# Patient Record
Sex: FEMALE | Race: WHITE | NOT HISPANIC OR LATINO | URBAN - METROPOLITAN AREA
[De-identification: names, ages, dates, MRNs, and addresses within clinical notes are randomized per-mention and may not be internally consistent; named-entity substitution may affect disease eponyms.]

---

## 2020-02-24 PROBLEM — Z96.1 PC IOL: Noted: 2020-02-24

## 2021-06-24 ENCOUNTER — NEW REFERRAL (OUTPATIENT)
Dept: URBAN - METROPOLITAN AREA CLINIC 105 | Facility: CLINIC | Age: 86
End: 2021-06-24

## 2021-06-24 VITALS — SYSTOLIC BLOOD PRESSURE: 160 MMHG | HEIGHT: 55 IN | DIASTOLIC BLOOD PRESSURE: 79 MMHG

## 2021-06-24 DIAGNOSIS — H35.3112: ICD-10-CM

## 2021-06-24 DIAGNOSIS — H35.352: ICD-10-CM

## 2021-06-24 DIAGNOSIS — H35.89: ICD-10-CM

## 2021-06-24 DIAGNOSIS — Z96.1: ICD-10-CM

## 2021-06-24 PROCEDURE — 99244 OFF/OP CNSLTJ NEW/EST MOD 40: CPT

## 2021-06-24 PROCEDURE — 92202 OPSCPY EXTND ON/MAC DRAW: CPT

## 2021-06-24 PROCEDURE — 92134 CPTRZ OPH DX IMG PST SGM RTA: CPT

## 2021-06-24 ASSESSMENT — VISUAL ACUITY
OD_SC: 20/100
OS_SC: 20/70
OD_CC: 20/50
OD_PH: 20/40-2
OS_CC: 20/40

## 2021-06-24 ASSESSMENT — TONOMETRY
OS_IOP_MMHG: 11
OD_IOP_MMHG: 13

## 2021-07-15 ENCOUNTER — DIAGNOSTICS ONLY (OUTPATIENT)
Dept: URBAN - METROPOLITAN AREA CLINIC 105 | Facility: CLINIC | Age: 86
End: 2021-07-15

## 2021-07-15 DIAGNOSIS — H35.3132: ICD-10-CM

## 2021-07-15 DIAGNOSIS — H35.042: ICD-10-CM

## 2021-07-15 DIAGNOSIS — H35.89: ICD-10-CM

## 2021-07-15 PROCEDURE — 92250 FUNDUS PHOTOGRAPHY W/I&R: CPT

## 2021-07-15 PROCEDURE — 92235 FLUORESCEIN ANGRPH MLTIFRAME: CPT

## 2021-07-15 ASSESSMENT — VISUAL ACUITY
OS_CC: 20/50
OD_CC: 20/40

## 2021-07-15 ASSESSMENT — TONOMETRY
OD_IOP_MMHG: 11
OS_IOP_MMHG: 12

## 2021-11-09 PROBLEM — R00.2 PALPITATIONS: Status: ACTIVE | Noted: 2021-11-09

## 2021-11-09 PROBLEM — I48.91 A-FIB (CMS/HCC): Status: ACTIVE | Noted: 2021-11-09

## 2021-11-10 ENCOUNTER — OFFICE VISIT (OUTPATIENT)
Dept: CARDIOLOGY | Facility: CLINIC | Age: 86
End: 2021-11-10
Payer: MEDICARE

## 2021-11-10 VITALS
WEIGHT: 146 LBS | BODY MASS INDEX: 23.46 KG/M2 | HEIGHT: 66 IN | HEART RATE: 58 BPM | DIASTOLIC BLOOD PRESSURE: 90 MMHG | RESPIRATION RATE: 16 BRPM | SYSTOLIC BLOOD PRESSURE: 130 MMHG

## 2021-11-10 DIAGNOSIS — R00.2 PALPITATIONS: ICD-10-CM

## 2021-11-10 DIAGNOSIS — I48.91 ATRIAL FIBRILLATION, UNSPECIFIED TYPE (CMS/HCC): Primary | ICD-10-CM

## 2021-11-10 DIAGNOSIS — R42 LIGHTHEADEDNESS: ICD-10-CM

## 2021-11-10 PROCEDURE — 93000 ELECTROCARDIOGRAM COMPLETE: CPT | Performed by: INTERNAL MEDICINE

## 2021-11-10 PROCEDURE — 99203 OFFICE O/P NEW LOW 30 MIN: CPT | Performed by: INTERNAL MEDICINE

## 2021-11-10 RX ORDER — SPIRONOLACTONE 25 MG
600 TABLET ORAL ONCE
COMMUNITY

## 2021-11-10 RX ORDER — SUMATRIPTAN SUCCINATE 50 MG/1
50 TABLET ORAL ONCE AS NEEDED
COMMUNITY
Start: 2021-09-22 | End: 2022-07-01 | Stop reason: SDUPTHER

## 2021-11-10 RX ORDER — MINERAL OIL
180 ENEMA (ML) RECTAL ONCE
COMMUNITY
End: 2024-02-17 | Stop reason: ENTERED-IN-ERROR

## 2021-11-10 RX ORDER — TRIAMCINOLONE ACETONIDE 55 UG/1
2 SPRAY, METERED NASAL DAILY PRN
COMMUNITY
End: 2024-02-17 | Stop reason: ENTERED-IN-ERROR

## 2021-11-10 RX ORDER — METOPROLOL SUCCINATE 25 MG/1
25 TABLET, EXTENDED RELEASE ORAL ONCE
COMMUNITY
Start: 2021-02-23 | End: 2022-02-09 | Stop reason: SDUPTHER

## 2021-11-10 ASSESSMENT — CHADS2 SCORE
DIABETES: NO
CHADS2 SCORE: 3
SEX: FEMALE (+1PT.)
HYPERTENSION: NO
AGE: 75+ (+2 PT.)
VASCULAR DISEASE: NO
CHF: NO
PRIOR STROKE OR TIA OR THROMBOEMBOLISM: NO

## 2021-11-10 NOTE — PROGRESS NOTES
November 13, 2021      Patient: Shalini Kunz   YOB: 1935   Date of Visit 11/10/2021   [facility]# [id number]   MRN# [id number]       REPORT TYPE: Office Letter    DATE OF SERVICE: 11/10/2021    Robin Pablo MD    Dear Jf,    I met a patient named Shalini Kunz today in my office.  You had suggested that she see me because she has moved into this area recently.  Unfortunately, she did not bring along any records.  Much of what I gathered today was from a patient care summary that she had and her recollection.  From what I could tell, she is an 86-year-old woman who several years ago while in her 20s had the beginnings of supraventricular tachycardia.  That arrhythmia has occurred rarely over the last several years and has not been a particular bother to her. However, approximately 2 years ago, she had gastrointestinal surgery and postoperatively developed DVT and a pulmonary embolism.  She was started on anticoagulation and is now using APIXABAN at a dose of 2.5 mg twice per day.  Her other medicines are SUMATRIPTAN, FEXOFENADINE, and METOPROLOL 25 mg once a day.  When she had her pulmonary embolism, she said she may have also had the onset of atrial fibrillation, but that has not been a very prominent problem for  her.  In fact, she has been mostly asymptomatic.  Her principal complaint today was lightheadedness.  She occasionally becomes lightheaded without warning and without necessarily a change in position to the point of needing to steady herself.  She has not had syncope or presyncope.  She told me that she has had cardiac monitoring as well as noninvasive cardiac tests through your office within the last couple of years, but again I do not have any of those reports.  I did have a CBC that was drawn for her in April of this year that showed a normal blood count and platelet count.    Ms. Kunz has a history of some form of liver disease, pyloric stricture, hyperlipidemia, and she has been  labeled as hypertensive, although she does remember taking any antihypertensive medications.  She is retired and is now living with her  at [place].  She has a history of having had eye surgery and gallbladder removal.  She has not been a smoker and does not use large amounts of caffeine or alcohol and denied the use of illicit drugs.  She did not tell us about any specific drug or food allergies.    On physical examination, blood pressure was 130/90 without orthostatic change, with a heart rate of 58 beats per minute.  His rhythm was regular without ectopic beats.  Respiratory rate was 18 and she weighed 146 pounds.  Her cardiopulmonary examination was unremarkable.  She did not have any murmurs or gallops, and her lung fields were clear.  She did not have jugular venous distention, her carotid upstrokes were brisk and she had no bruits.  She had no abdominal findings of organomegaly or ascites, and she did not have any   peripheral edema.  I could easily palpate pulses. Her electrocardiogram showed sinus rhythm with just a faint suggestion of an intraatrial conduction delay.    I had a long conversation with Ms. Kunz.  I did not change her medicines as I believe she is on a fairly reasonable and conservative program.  I am going to have to see the results of cardiac testing that has been carried out in the last couple of years.  If you could send those over to our office, that would be very helpful.  Ms. Kunz will be in touch with your secretarial staff to help make that happen soon.  I did not order any studies until I review the tests that have been carried out so far. I will have Ms. Kunz back to my office when those records are available and we will rediscuss her situation.  She is also going to be seeing one of internists in the area, Dr. Pittman, for general internal medicine care and I am sure that Renée will be ordering some laboratory studies as well.    I answered a number of questions for Ms.  Ze and she was quite satisfied with our plan of having her back to my office after the first of the year for a reconnoiter.  In the meantime, we will be available to answer any of her questions or to address concerns.  We do appreciate the opportunity to see her in the office.    I spent approximately 45 minutes with Ms. Kunz's case this afternoon going through her information, taking a history, performing a physical examination, interpreting her electrocardiogram, going through her medication list, making plans for follow-up, answering her questions, and documenting all of the above.    Thank you for your consideration.    Best regards,      Adrian Jo MD    CC:LOAN BRAN DO    DD: 11/10/2021 15:46  DT: 11/10/2021 15:49  Voice ID: [id number]/Report ID: [id number]  am

## 2021-12-03 ENCOUNTER — TELEPHONE (OUTPATIENT)
Dept: PRIMARY CARE | Facility: CLINIC | Age: 86
End: 2021-12-03

## 2021-12-03 DIAGNOSIS — I48.91 ATRIAL FIBRILLATION, UNSPECIFIED TYPE (CMS/HCC): Primary | ICD-10-CM

## 2021-12-03 DIAGNOSIS — E55.9 VITAMIN D DEFICIENCY: ICD-10-CM

## 2021-12-03 DIAGNOSIS — E53.8 B12 DEFICIENCY: ICD-10-CM

## 2021-12-03 DIAGNOSIS — I10 HYPERTENSION, UNSPECIFIED TYPE: ICD-10-CM

## 2021-12-03 DIAGNOSIS — E78.5 HYPERLIPIDEMIA, UNSPECIFIED HYPERLIPIDEMIA TYPE: ICD-10-CM

## 2021-12-03 DIAGNOSIS — R79.9 ABNORMAL FINDING OF BLOOD CHEMISTRY, UNSPECIFIED: ICD-10-CM

## 2021-12-07 ENCOUNTER — LAB REQUISITION (OUTPATIENT)
Dept: LAB | Facility: HOSPITAL | Age: 86
End: 2021-12-07
Attending: FAMILY MEDICINE
Payer: MEDICARE

## 2021-12-07 DIAGNOSIS — I10 ESSENTIAL (PRIMARY) HYPERTENSION: ICD-10-CM

## 2021-12-07 DIAGNOSIS — I48.91 UNSPECIFIED ATRIAL FIBRILLATION (CMS/HCC): ICD-10-CM

## 2021-12-07 DIAGNOSIS — E78.5 HYPERLIPIDEMIA, UNSPECIFIED: ICD-10-CM

## 2021-12-07 LAB
25(OH)D3 SERPL-MCNC: 25 NG/ML (ref 30–100)
ALBUMIN SERPL-MCNC: 3.6 G/DL (ref 3.4–5)
ALP SERPL-CCNC: 105 IU/L (ref 35–126)
ALT SERPL-CCNC: 17 IU/L (ref 11–54)
ANION GAP SERPL CALC-SCNC: 8 MEQ/L (ref 3–15)
AST SERPL-CCNC: 21 IU/L (ref 15–41)
BASOPHILS # BLD: 0.02 K/UL (ref 0.01–0.1)
BASOPHILS NFR BLD: 0.4 %
BILIRUB SERPL-MCNC: 0.7 MG/DL (ref 0.3–1.2)
BUN SERPL-MCNC: 18 MG/DL (ref 8–20)
CALCIUM SERPL-MCNC: 9.5 MG/DL (ref 8.9–10.3)
CHLORIDE SERPL-SCNC: 105 MEQ/L (ref 98–109)
CHOLEST SERPL-MCNC: 173 MG/DL
CO2 SERPL-SCNC: 25 MEQ/L (ref 22–32)
CREAT SERPL-MCNC: 0.9 MG/DL (ref 0.6–1.1)
DIFFERENTIAL METHOD BLD: ABNORMAL
EOSINOPHIL # BLD: 0.06 K/UL (ref 0.04–0.36)
EOSINOPHIL NFR BLD: 1.3 %
ERYTHROCYTE [DISTWIDTH] IN BLOOD BY AUTOMATED COUNT: 12.5 % (ref 11.7–14.4)
EST. AVERAGE GLUCOSE BLD GHB EST-MCNC: 108 MG/DL
GFR SERPL CREATININE-BSD FRML MDRD: 59.7 ML/MIN/1.73M*2
GLUCOSE SERPL-MCNC: 86 MG/DL (ref 70–99)
HBA1C MFR BLD HPLC: 5.4 %
HCT VFR BLDCO AUTO: 43.7 % (ref 35–45)
HDLC SERPL-MCNC: 52 MG/DL
HDLC SERPL: 3.3 {RATIO}
HGB BLD-MCNC: 14 G/DL (ref 11.8–15.7)
IMM GRANULOCYTES # BLD AUTO: 0.01 K/UL (ref 0–0.08)
IMM GRANULOCYTES NFR BLD AUTO: 0.2 %
LDLC SERPL CALC-MCNC: 86 MG/DL
LYMPHOCYTES # BLD: 1.4 K/UL (ref 1.2–3.5)
LYMPHOCYTES NFR BLD: 29.6 %
MCH RBC QN AUTO: 30.2 PG (ref 28–33.2)
MCHC RBC AUTO-ENTMCNC: 32 G/DL (ref 32.2–35.5)
MCV RBC AUTO: 94.2 FL (ref 83–98)
MONOCYTES # BLD: 0.5 K/UL (ref 0.28–0.8)
MONOCYTES NFR BLD: 10.6 %
NEUTROPHILS # BLD: 2.74 K/UL (ref 1.7–7)
NEUTS SEG NFR BLD: 57.9 %
NONHDLC SERPL-MCNC: 121 MG/DL
NRBC BLD-RTO: 0 %
PDW BLD AUTO: 11.2 FL (ref 9.4–12.3)
PLATELET # BLD AUTO: 209 K/UL (ref 150–369)
POTASSIUM SERPL-SCNC: 4.5 MEQ/L (ref 3.6–5.1)
PROT SERPL-MCNC: 5.1 G/DL (ref 6–8.2)
RBC # BLD AUTO: 4.64 M/UL (ref 3.93–5.22)
SODIUM SERPL-SCNC: 138 MEQ/L (ref 136–144)
T4 FREE SERPL-MCNC: 1.15 NG/DL (ref 0.58–1.64)
TRIGL SERPL-MCNC: 174 MG/DL (ref 30–149)
TSH SERPL DL<=0.05 MIU/L-ACNC: 4.22 MIU/L (ref 0.34–5.6)
VIT B12 SERPL-MCNC: 195 PG/ML (ref 180–914)
WBC # BLD AUTO: 4.73 K/UL (ref 3.8–10.5)

## 2021-12-07 PROCEDURE — 82607 VITAMIN B-12: CPT | Performed by: FAMILY MEDICINE

## 2021-12-07 PROCEDURE — 80053 COMPREHEN METABOLIC PANEL: CPT | Performed by: FAMILY MEDICINE

## 2021-12-07 PROCEDURE — 80061 LIPID PANEL: CPT | Performed by: FAMILY MEDICINE

## 2021-12-07 PROCEDURE — 84443 ASSAY THYROID STIM HORMONE: CPT | Performed by: FAMILY MEDICINE

## 2021-12-07 PROCEDURE — 85025 COMPLETE CBC W/AUTO DIFF WBC: CPT | Performed by: FAMILY MEDICINE

## 2021-12-07 PROCEDURE — 84439 ASSAY OF FREE THYROXINE: CPT | Performed by: FAMILY MEDICINE

## 2021-12-07 PROCEDURE — 83036 HEMOGLOBIN GLYCOSYLATED A1C: CPT | Mod: GZ | Performed by: FAMILY MEDICINE

## 2021-12-07 PROCEDURE — 82306 VITAMIN D 25 HYDROXY: CPT | Mod: GZ | Performed by: FAMILY MEDICINE

## 2021-12-08 ENCOUNTER — TELEPHONE (OUTPATIENT)
Dept: PRIMARY CARE | Facility: CLINIC | Age: 86
End: 2021-12-08
Payer: MEDICARE

## 2021-12-08 NOTE — TELEPHONE ENCOUNTER
Called and spoke to pt re: stable lab results - also relay Dr. SERRANO recommendation for pt to take 1000 mcg of vitamin B 12 and 4000 IU of vitamin D3 OTC daily - pt ask to repeat information, which was done - pt did express understanding of the recommendation[s].

## 2021-12-08 NOTE — TELEPHONE ENCOUNTER
----- Message from Renée Pittman DO sent at 12/8/2021  7:51 AM EST -----  [place] resident. Please let her know her labs are stable and good. However her vitamin D and vitamin b12 are LOW. I would like her to start taking vitamin B 12 1000mcg OTC daily and vitamin D3 4000 IU OTC po daily. Thank you

## 2021-12-10 ENCOUNTER — OFFICE VISIT (OUTPATIENT)
Dept: INTERNAL MEDICINE | Facility: CLINIC | Age: 86
End: 2021-12-10
Payer: MEDICARE

## 2021-12-10 DIAGNOSIS — I10 ESSENTIAL HYPERTENSION: Primary | ICD-10-CM

## 2021-12-10 DIAGNOSIS — E55.9 VITAMIN D DEFICIENCY: ICD-10-CM

## 2021-12-10 DIAGNOSIS — E78.5 HYPERLIPIDEMIA, UNSPECIFIED HYPERLIPIDEMIA TYPE: ICD-10-CM

## 2021-12-10 DIAGNOSIS — E53.8 B12 DEFICIENCY: ICD-10-CM

## 2021-12-10 DIAGNOSIS — Z79.01 CHRONIC ANTICOAGULATION: ICD-10-CM

## 2021-12-10 DIAGNOSIS — I48.91 ATRIAL FIBRILLATION, UNSPECIFIED TYPE (CMS/HCC): ICD-10-CM

## 2021-12-10 DIAGNOSIS — I47.10 SVT (SUPRAVENTRICULAR TACHYCARDIA) (CMS/HCC): ICD-10-CM

## 2021-12-10 DIAGNOSIS — H26.9 CATARACT OF RIGHT EYE, UNSPECIFIED CATARACT TYPE: ICD-10-CM

## 2021-12-10 DIAGNOSIS — R79.9 ABNORMAL FINDING OF BLOOD CHEMISTRY, UNSPECIFIED: ICD-10-CM

## 2021-12-10 DIAGNOSIS — Z01.818 PREOP EXAMINATION: ICD-10-CM

## 2021-12-10 PROCEDURE — 99205 OFFICE O/P NEW HI 60 MIN: CPT | Performed by: FAMILY MEDICINE

## 2021-12-10 PROCEDURE — G8756 NO BP MEASURE DOC: HCPCS | Performed by: FAMILY MEDICINE

## 2021-12-10 RX ORDER — LANOLIN ALCOHOL/MO/W.PET/CERES
1000 CREAM (GRAM) TOPICAL DAILY
Qty: 90 TABLET | Refills: 3 | COMMUNITY
Start: 2021-12-10 | End: 2025-03-25 | Stop reason: ALTCHOICE

## 2021-12-10 RX ORDER — ATORVASTATIN CALCIUM 10 MG/1
5 TABLET, FILM COATED ORAL DAILY
Qty: 45 TABLET | Refills: 1 | COMMUNITY
Start: 2021-12-10 | End: 2022-02-03 | Stop reason: SDUPTHER

## 2021-12-10 RX ORDER — OMEPRAZOLE 40 MG/1
40 CAPSULE, DELAYED RELEASE ORAL DAILY
COMMUNITY
Start: 2021-12-08 | End: 2024-03-19 | Stop reason: SDUPTHER

## 2021-12-10 RX ORDER — CHOLECALCIFEROL (VITAMIN D3) 25 MCG
1000 TABLET ORAL DAILY
Qty: 90 TABLET | Refills: 3 | COMMUNITY
Start: 2021-12-10

## 2021-12-10 NOTE — PROGRESS NOTES
"            OFFICE VISIT NOTE JHOANNE BRAN DO        PATIENT NAME:Shalini Kunz  PATIENT : 1935  ANNE MARIE: 12/10/2021    CC: No chief complaint on file.  \"lab review\"           HPI   Shalini Kunz is a 86 y.o. female  With HTN, SVT and pyloric stricture seen as a NPV. She is a [facility] resident living with her .     #preop cataract, right   failed left in past   plan for dec 20 2021 procedure   dr tse    #afib  #HTN  - dr duque caridiology  - eliquis 2.5 po bid, metoprolol 25mg po daily    #hx of PE    #HLD- lipitor 5mg po daily    #vitamin D def- dec level 25 takign 1000 IU po daily  #b12 def- level 195 low dec 2021 taking 1000 IU po daily     #GOO sp lap gastro-jejunostomy dr Sy 2019   #pyloric stenosis- dilation in past    #sp CCY        SOCIAL HISTORY:  [facility]   lives with    nightly wine      FUNCTIONAL HISTORY:  ADL   Feeding-I  Toileting-I  Dressing-I  Bathing-I  Transferring-I    IADL  Medications-I  Shopping-I  Finances-I  Cooking-I  Cleaning-I        ADVANCED CARE PLANNING:  Full           HEALTH MAINTENANCE    -- Pneumonia Immunization:utd   -- Influenza Immunization:utd  -- Shingles Immunization:  -- Colorectal cancer screening:completed  -- Breast Cancer screening:reviewed goc. Completed for now  -- Cervical cancer screening:completed  -- Bone Health screening:completed  -- Vision Screening:dr tse   -- Hearing Screening:          The following have been reviewed and updated as appropriate in this visit: active problem list, medication list, allergies, family history, social history, health maintenance            Past Medical History:   Diagnosis Date   • Anemia    • Arthritis     thumbs   • Deep vein thrombosis (CMS/HCC)     2019   • Hypertension    • Lightheadedness    • Lipid disorder    • Liver disease        Past Surgical History:   Procedure Laterality Date   • ABDOMINAL SURGERY      2019   • ADENOIDECTOMY Bilateral    • CHOLECYSTECTOMY  10/03/2011   • " EYE SURGERY      2020    • GANGLION CYST EXCISION     • GASTRIC BYPASS     • TONSILLECTOMY       Social History     Socioeconomic History   • Marital status:      Spouse name: Not on file   • Number of children: Not on file   • Years of education: Not on file   • Highest education level: Not on file   Occupational History   • Not on file   Tobacco Use   • Smoking status: Never Smoker   • Smokeless tobacco: Never Used   Substance and Sexual Activity   • Alcohol use: Yes     Alcohol/week: 1.0 standard drink     Types: 1 Glasses of wine per week     Comment: half one at dinner    • Drug use: Not on file   • Sexual activity: Not on file   Other Topics Concern   • Not on file   Social History Narrative   • Not on file     Social Determinants of Health     Financial Resource Strain: Not on file   Food Insecurity: Not on file   Transportation Needs: Not on file   Physical Activity: Not on file   Stress: Not on file   Social Connections: Not on file   Intimate Partner Violence: Not on file   Housing Stability: Not on file         Family History   Problem Relation Age of Onset   • Heart failure Biological Father          No Known Allergies      Current Outpatient Medications   Medication Sig Dispense Refill   • apixaban (ELIQUIS) 2.5 mg tablet Take 1 tablet (2.5 mg total) by mouth 2 (two) times a day. 90 tablet 3   • calcium carbonate-vitamin D3 (CALCIUM 600 WITH VITAMIN D3) 600 mg(1,500mg) -400 unit tablet,chewable Take 600 mg by mouth once.     • fexofenadine 180 mg tablet Take 180 mg by mouth once.     • iron, carbonyl 25 mg iron tablet Take 25 mg by mouth daily.     • metoprolol succinate XL 25 mg 24 hr tablet Take 25 mg by mouth once.     • multivitamin capsule Take 1 capsule by mouth daily.     • omeprazole 40 mg capsule Take 40 mg by mouth daily.     • oxymetazoline HCl (NASAL SPRAY 12HR,OXYMETAZOLINE NASL) Nasal Spray 12Hr(oxymetazoline     • SUMAtriptan 50 mg tablet Take 50 mg by mouth once as needed.     •  "triamcinolone 55 mcg nasal inhaler Administer 2 sprays into affected nostril(s) daily.       No current facility-administered medications for this visit.       Review of Systems  ROS  See hpi.   General: Denies fatigue, weight loss or weight gain.    Head: Denies headaches trauma   Eyes: Denies blurry vision, diplopia, decreased vision.  Denies drainage or excessive tearing.  Ears: Denies tinnitis, decreased hearing, ear drainage  Nose: Denies rhinorrhea, epistaxi  Mouth: Denies dry mouth  Neck: Denies lumps, bumps.  Denies dysphagia, odynophagia  Pulmonary:  Denies shortness of breath, difficulty breathing, excessive drooling, change in sputum.   Cardiac: Denies chest pain, flip-flop sensation   GI/Abdomen: Denies vomit, nausea, diarrhea, constipation, hematochezia.  Denies abdominal pain.    Uro/: Denies hematuria, urgency, frequency, burning sensation with urination. Denies discharge.   Skin: Denies lumps, bumps, rash.   MSK:  Denies weakness, numbness, tingling.    Neuro: Denies confusion, vertigo.              VITALS  There were no vitals filed for this visit.  128/62 98F hr 66 97%RA        Wt Readings from Last 3 Encounters:   11/10/21 66.2 kg (146 lb)       Ht Readings from Last 3 Encounters:   11/10/21 1.676 m (5' 6\")       BP Readings from Last 3 Encounters:   11/10/21 130/90       Physical Exam  PHYSICAL EXAM  General: Appears well, in no distress. Pt is pleasant. Hygiene normal.  Head: NC/AT.   Eyes: Conjunctiva and sclera normal bilaterally. No lid-lag.  PERRL. EOMI.   Ears: No tenderness of external ears bilterally.  Tympanic membrane Intact bilaterally.  No cerumen impaction. No erythema of canals bilaterally.   Nose: Inferior turbinates normal bilaterally.   Mouth: Oral mucosa pink and moist.No erythema or edema of oral pharynx. No tonsillar exudates or hypertrophy. Uvula midline and without swelling.   Neck: Inspection normal. Trachea midline. supple, FROM without pain. No cervical lymphadenopathy. "  No enlargement of thyroid.  Cardiac: regular, rate, and rhythm. No murmurs, rubs, or gallops.  Lungs: negative for respiratory distress with normal respiratory effort. Lungs clear to auscultation bilaterally. No wheezes, rales, or rhonchi. No intercostal retractions  Abdomen: +BS. Bowel sounds normal. Abdomen is soft, non-distended. No tenderness. No masses or organomegaly. No guarding.   Skin: warm and dry. No erythema or rash.  Neuro: CNII-XII intact.   Extremities: No peripheral edema or extremity lymphadenopathy  MSK: 5/5 UE and LE b/l. Gait stable and intact. Sitting Balance stable.   Psych: linear. Normal mood and affect.  No SI/HI. AAOX3.           PERTINENT LABS, IMAGING    No new labs.  Lab Results   Component Value Date    WBC 4.73 12/07/2021    HGB 14.0 12/07/2021    HCT 43.7 12/07/2021    MCV 94.2 12/07/2021     12/07/2021         Chemistry        Component Value Date/Time     12/07/2021 0921    K 4.5 12/07/2021 0921     12/07/2021 0921    CO2 25 12/07/2021 0921    BUN 18 12/07/2021 0921    CREATININE 0.9 12/07/2021 0921        Component Value Date/Time    CALCIUM 9.5 12/07/2021 0921    ALKPHOS 105 12/07/2021 0921    AST 21 12/07/2021 0921    ALT 17 12/07/2021 0921    BILITOT 0.7 12/07/2021 0921            Lab Results   Component Value Date    CHOL 173 12/07/2021     Lab Results   Component Value Date    HDL 52 (L) 12/07/2021     Lab Results   Component Value Date    LDLCALC 86 12/07/2021     Lab Results   Component Value Date    TRIG 174 (H) 12/07/2021     No results found for: CHOLHDL    Lab Results   Component Value Date    TSH 4.22 12/07/2021       Lab Results   Component Value Date    HGBA1C 5.4 12/07/2021       No results found for: HAV, HEPAIGM, HEPBIGM, HEPBCAB, HBEAG, HEPCAB    No results found for: MICROALBUR, [id number]    No results found for this or any previous visit (from the past 24 hour(s)).    No results found.            Immunization History   Administered Date(s)  Administered   • Sars-cov-2 (Covid-19) Vaccine, Pfizer 02/10/2021, 03/24/2021         Health Maintenance   Topic Date Due   • DEXA Scan  Never done   • Medicare Annual Wellness Visit  Never done   • Hepatitis C Screening  Never done   • DTaP, Tdap, and Td Vaccines (1 - Tdap) Never done   • Zoster Vaccine (1 of 2) Never done   • Pneumococcal (65 years and older) (1 of 1 - PPSV23) Never done   • Annual Falls Risk Screening  Never done   • Influenza Vaccine (1) Never done   • COVID-19 Vaccine (3 - Booster for Pfizer series) 09/24/2021   • Meningococcal ACWY  Aged Out   • HIB Vaccines  Aged Out   • IPV Vaccines  Aged Out   • HPV Vaccines  Aged Out   • Pneumococcal  Aged Out            Diagnosis Plan   1. Essential hypertension  Vitamin D 25 hydroxy    Vitamin B12    TSH 3rd Generation    T4, free    Hemoglobin A1c    Comprehensive metabolic panel    CBC and Differential    Lipid panel   2. SVT (supraventricular tachycardia) (CMS/HCC)     3. Hyperlipidemia, unspecified hyperlipidemia type  Lipid panel   4. Atrial fibrillation, unspecified type (CMS/HCC)  Vitamin D 25 hydroxy    Vitamin B12    TSH 3rd Generation    T4, free    Hemoglobin A1c    Comprehensive metabolic panel    CBC and Differential    Lipid panel   5. Chronic anticoagulation  Vitamin D 25 hydroxy    Vitamin B12    TSH 3rd Generation    T4, free    Hemoglobin A1c    Comprehensive metabolic panel    CBC and Differential    Lipid panel   6. B12 deficiency  Vitamin B12   7. Vitamin D deficiency  Vitamin D 25 hydroxy   8. Abnormal finding of blood chemistry, unspecified   Hemoglobin A1c   9. Preop examination     10. Cataract of right eye, unspecified cataract type         Shalini Kunz is a 86 y.o. female  With HTN, SVT and pyloric stricture seen as a NPV. She is a [facility] resident living with her .     #preop cataract, right   failed left in past   plan for dec 20 2021 procedure   dr tse  Medically cleared   fax to surgeon    #afib  #HTN  -   [facility]  - eliquis 2.5 po bid, metoprolol 25mg po daily    #hx of PE    #HLD- lipitor 5mg po daily  Recent level stable   check level     #vitamin D def- dec level 25 takign 1000 IU po daily  Repeat level    #b12 def- level 195 low dec 2021 taking 1000 IU po daily   Repeat level    #GOO sp lap gastro-jejunostomy dr Sy July 2019   #pyloric stenosis- dilation in past    #sp CCY      Return in about 6 months (around 6/10/2022) for Followup with Dr. Renée Pittman.    Renée Pittman, DO

## 2022-02-03 ENCOUNTER — TELEPHONE (OUTPATIENT)
Dept: SCHEDULING | Facility: CLINIC | Age: 87
End: 2022-02-03
Payer: MEDICARE

## 2022-02-03 RX ORDER — ATORVASTATIN CALCIUM 10 MG/1
5 TABLET, FILM COATED ORAL DAILY
Qty: 45 TABLET | Refills: 1 | Status: SHIPPED | OUTPATIENT
Start: 2022-02-03 | End: 2024-01-16 | Stop reason: SDUPTHER

## 2022-02-03 NOTE — TELEPHONE ENCOUNTER
Medicine Refill Request    Last Office: Visit date not found   Last Consult Visit: Visit date not found  Last Telemedicine Visit: Visit date not found    Next Appointment: Visit date not found  atorvastatin 10 mg tablet- 30 day supply    Current Outpatient Medications:   •  apixaban (ELIQUIS) 2.5 mg tablet, Take 1 tablet (2.5 mg total) by mouth 2 (two) times a day., Disp: 90 tablet, Rfl: 3  •  atorvastatin 10 mg tablet, Take 0.5 tablets (5 mg total) by mouth daily., Disp: 45 tablet, Rfl: 1  •  calcium carbonate-vitamin D3 (CALCIUM 600 WITH VITAMIN D3) 600 mg(1,500mg) -400 unit tablet,chewable, Take 600 mg by mouth once., Disp: , Rfl:   •  cholecalciferol, vitamin D3, (cholecalciferol) 1,000 unit (25 mcg) tablet, Take 1 tablet (1,000 Units total) by mouth daily., Disp: 90 tablet, Rfl: 3  •  cyanocobalamin 1,000 mcg tablet, Take 1 tablet (1,000 mcg total) by mouth daily., Disp: 90 tablet, Rfl: 3  •  fexofenadine 180 mg tablet, Take 180 mg by mouth once., Disp: , Rfl:   •  iron, carbonyl 25 mg iron tablet, Take 25 mg by mouth daily., Disp: , Rfl:   •  metoprolol succinate XL 25 mg 24 hr tablet, Take 25 mg by mouth once., Disp: , Rfl:   •  multivitamin capsule, Take 1 capsule by mouth daily., Disp: , Rfl:   •  omeprazole 40 mg capsule, Take 40 mg by mouth daily., Disp: , Rfl:   •  SUMAtriptan 50 mg tablet, Take 50 mg by mouth once as needed., Disp: , Rfl:   •  triamcinolone 55 mcg nasal inhaler, Administer 2 sprays into affected nostril(s) daily., Disp: , Rfl:       BP Readings from Last 3 Encounters:   11/10/21 130/90       Recent Lab results:  Lab Results   Component Value Date    CHOL 173 12/07/2021   ,   Lab Results   Component Value Date    HDL 52 (L) 12/07/2021   ,   Lab Results   Component Value Date    LDLCALC 86 12/07/2021   ,   Lab Results   Component Value Date    TRIG 174 (H) 12/07/2021        Lab Results   Component Value Date    GLUCOSE 86 12/07/2021   ,   Lab Results   Component Value Date    HGBA1C 5.4  12/07/2021         Lab Results   Component Value Date    CREATININE 0.9 12/07/2021       Lab Results   Component Value Date    TSH 4.22 12/07/2021

## 2022-02-09 RX ORDER — METOPROLOL SUCCINATE 25 MG/1
25 TABLET, EXTENDED RELEASE ORAL ONCE
Qty: 90 TABLET | Refills: 1 | Status: SHIPPED | OUTPATIENT
Start: 2022-02-09 | End: 2022-02-14

## 2022-02-09 NOTE — TELEPHONE ENCOUNTER
Medicine Refill Request    Last Office: Visit date not found   Last Consult Visit: Visit date not found  Last Telemedicine Visit: Visit date not found    Next Appointment: Visit date not found    Metoprolol succinate 25mg 90day supply w/ refills    Current Outpatient Medications:   •  apixaban (ELIQUIS) 2.5 mg tablet, Take 1 tablet (2.5 mg total) by mouth 2 (two) times a day., Disp: 90 tablet, Rfl: 3  •  atorvastatin (LIPITOR) 10 mg tablet, Take 0.5 tablets (5 mg total) by mouth daily., Disp: 45 tablet, Rfl: 1  •  calcium carbonate-vitamin D3 (CALCIUM 600 WITH VITAMIN D3) 600 mg(1,500mg) -400 unit tablet,chewable, Take 600 mg by mouth once., Disp: , Rfl:   •  cholecalciferol, vitamin D3, (cholecalciferol) 1,000 unit (25 mcg) tablet, Take 1 tablet (1,000 Units total) by mouth daily., Disp: 90 tablet, Rfl: 3  •  cyanocobalamin 1,000 mcg tablet, Take 1 tablet (1,000 mcg total) by mouth daily., Disp: 90 tablet, Rfl: 3  •  fexofenadine 180 mg tablet, Take 180 mg by mouth once., Disp: , Rfl:   •  iron, carbonyl 25 mg iron tablet, Take 25 mg by mouth daily., Disp: , Rfl:   •  metoprolol succinate XL 25 mg 24 hr tablet, Take 25 mg by mouth once., Disp: , Rfl:   •  multivitamin capsule, Take 1 capsule by mouth daily., Disp: , Rfl:   •  omeprazole 40 mg capsule, Take 40 mg by mouth daily., Disp: , Rfl:   •  SUMAtriptan 50 mg tablet, Take 50 mg by mouth once as needed., Disp: , Rfl:   •  triamcinolone 55 mcg nasal inhaler, Administer 2 sprays into affected nostril(s) daily., Disp: , Rfl:       BP Readings from Last 3 Encounters:   11/10/21 130/90       Recent Lab results:  Lab Results   Component Value Date    CHOL 173 12/07/2021   ,   Lab Results   Component Value Date    HDL 52 (L) 12/07/2021   ,   Lab Results   Component Value Date    LDLCALC 86 12/07/2021   ,   Lab Results   Component Value Date    TRIG 174 (H) 12/07/2021        Lab Results   Component Value Date    GLUCOSE 86 12/07/2021   ,   Lab Results   Component Value  Date    HGBA1C 5.4 12/07/2021         Lab Results   Component Value Date    CREATININE 0.9 12/07/2021       Lab Results   Component Value Date    TSH 4.22 12/07/2021

## 2022-02-14 ENCOUNTER — TELEPHONE (OUTPATIENT)
Dept: SCHEDULING | Facility: CLINIC | Age: 87
End: 2022-02-14
Payer: MEDICARE

## 2022-02-14 RX ORDER — METOPROLOL SUCCINATE 25 MG/1
25 TABLET, EXTENDED RELEASE ORAL DAILY
Qty: 90 TABLET | Refills: 1 | Status: SHIPPED | OUTPATIENT
Start: 2022-02-14 | End: 2022-02-14

## 2022-02-14 RX ORDER — METOPROLOL SUCCINATE 25 MG/1
25 TABLET, EXTENDED RELEASE ORAL DAILY
Qty: 90 TABLET | Refills: 1 | Status: SHIPPED | OUTPATIENT
Start: 2022-02-14 | End: 2022-07-01 | Stop reason: SDUPTHER

## 2022-02-14 NOTE — TELEPHONE ENCOUNTER
Pt states she was told by [facility] that medication clarification is needed on metoprolol before they can send out to patient    [phone]

## 2022-02-23 ENCOUNTER — OFFICE VISIT (OUTPATIENT)
Dept: CARDIOLOGY | Facility: CLINIC | Age: 87
End: 2022-02-23
Payer: MEDICARE

## 2022-02-23 VITALS — HEIGHT: 66 IN | BODY MASS INDEX: 23.95 KG/M2 | RESPIRATION RATE: 16 BRPM | WEIGHT: 149 LBS

## 2022-02-23 DIAGNOSIS — I10 ESSENTIAL HYPERTENSION: ICD-10-CM

## 2022-02-23 DIAGNOSIS — R42 LIGHTHEADEDNESS: ICD-10-CM

## 2022-02-23 DIAGNOSIS — I48.91 ATRIAL FIBRILLATION, UNSPECIFIED TYPE (CMS/HCC): ICD-10-CM

## 2022-02-23 DIAGNOSIS — Z79.01 CHRONIC ANTICOAGULATION: ICD-10-CM

## 2022-02-23 DIAGNOSIS — R00.2 PALPITATIONS: Primary | ICD-10-CM

## 2022-02-23 PROCEDURE — 93000 ELECTROCARDIOGRAM COMPLETE: CPT | Performed by: INTERNAL MEDICINE

## 2022-02-23 PROCEDURE — G8756 NO BP MEASURE DOC: HCPCS | Performed by: INTERNAL MEDICINE

## 2022-02-23 PROCEDURE — 99213 OFFICE O/P EST LOW 20 MIN: CPT | Performed by: INTERNAL MEDICINE

## 2022-02-23 NOTE — PROGRESS NOTES
February 27, 2022      Patient: Shalini Kunz   YOB: 1935   Date of Visit 2/23/2022   [facility]# [id number]   MRN# [id number]       REPORT TYPE: Office Letter    DATE OF SERVICE: 02/23/2022    RENÉE BRAN DO    Dear RenéeShalini was in our cardiology outpatient offices today for a follow-up appointment.  As you will recall, we saw her about 3 months ago and thought that her situation was stable.  We wanted to see records from her previous cardiologist and they arrived.  As we had expected, there was nothing of significance.  She has had normal echocardiography in the past and is receiving an anticoagulant both for atrial fibrillation stroke prophylaxis as well as because of a DVT.  She is tolerating 2.5 mg of APIXABAN twice a day.  This is close to an appropriate dose based on her body size and age.  She is also using ATORVASTATIN, IRON, METOPROLOL, and MULTIVITAMINS.  She has been able to pursue all of her normal activities and had no cardiovascular complaints.  She was also in good spirits.    On physical examination, blood pressure was 118/80 with a heart rate of 70 beats per minute, and her rhythm was regular without ectopic beats.  Respiratory rate was 16 and she weighed 149 pounds.  Her cardiopulmonary examination was unremarkable as it was before.  She had no auscultatory findings and her lung fields were clear.  She had no signs of volume overload and peripheral vasculature was intact. Electrocardiogram showed an ectopic atrial rhythm and otherwise it was a normal tracing.  .  I made no changes in Ms. Kunz's medical program.  She will return to our offices with her  in approximately 6 months.  In the meantime, I encouraged her to maintain a high level of activity including regular exercise and to keep herself in good physical condition.  I am sure she will adhere.  She will also maintain her usual contact with you and her other physicians.  We will be available to answer  questions or to address any of your concerns.    I spent approximately 25 minutes with Ms. Kunz's case this afternoon taking a history, and performing a physical examination, interpreting her electrocardiogram, reviewing her medication list, going through her old records, answering a number of questions, making arrangements for follow-up, and documenting all of the above.    It was a pleasure to take Ms. Kunz on as a patient. I look forward to continuing to share care with you.    Best regards,    Adrian Jo MD    DD: 02/23/2022 14:58  DT: 02/23/2022 15:00  Voice ID: [id number]/Report ID: [id number]  am

## 2022-06-24 ENCOUNTER — LAB REQUISITION (OUTPATIENT)
Dept: LAB | Facility: HOSPITAL | Age: 87
End: 2022-06-24
Attending: FAMILY MEDICINE
Payer: MEDICARE

## 2022-06-24 ENCOUNTER — TELEPHONE (OUTPATIENT)
Dept: PRIMARY CARE | Facility: CLINIC | Age: 87
End: 2022-06-24
Payer: MEDICARE

## 2022-06-24 DIAGNOSIS — I48.91 UNSPECIFIED ATRIAL FIBRILLATION (CMS/HCC): ICD-10-CM

## 2022-06-24 DIAGNOSIS — I10 ESSENTIAL (PRIMARY) HYPERTENSION: ICD-10-CM

## 2022-06-24 DIAGNOSIS — Z79.01 LONG TERM (CURRENT) USE OF ANTICOAGULANTS: ICD-10-CM

## 2022-06-24 DIAGNOSIS — E78.5 HYPERLIPIDEMIA, UNSPECIFIED: ICD-10-CM

## 2022-06-24 DIAGNOSIS — E55.9 VITAMIN D DEFICIENCY, UNSPECIFIED: ICD-10-CM

## 2022-06-24 DIAGNOSIS — E53.8 DEFICIENCY OF OTHER SPECIFIED B GROUP VITAMINS: ICD-10-CM

## 2022-06-24 LAB
25(OH)D3 SERPL-MCNC: 48 NG/ML (ref 30–100)
ALBUMIN SERPL-MCNC: 3.8 G/DL (ref 3.4–5)
ALP SERPL-CCNC: 103 IU/L (ref 35–126)
ALT SERPL-CCNC: 16 IU/L (ref 11–54)
ANION GAP SERPL CALC-SCNC: 10 MEQ/L (ref 3–15)
AST SERPL-CCNC: 22 IU/L (ref 15–41)
BASOPHILS # BLD: 0.02 K/UL (ref 0.01–0.1)
BASOPHILS NFR BLD: 0.5 %
BILIRUB SERPL-MCNC: 0.9 MG/DL (ref 0.3–1.2)
BUN SERPL-MCNC: 15 MG/DL (ref 8–20)
BURR CELLS BLD QL SMEAR: ABNORMAL
CALCIUM SERPL-MCNC: 9.6 MG/DL (ref 8.9–10.3)
CHLORIDE SERPL-SCNC: 105 MEQ/L (ref 98–109)
CHOLEST SERPL-MCNC: 172 MG/DL
CO2 SERPL-SCNC: 23 MEQ/L (ref 22–32)
CREAT SERPL-MCNC: 0.8 MG/DL (ref 0.6–1.1)
DIFFERENTIAL METHOD BLD: ABNORMAL
EOSINOPHIL # BLD: 0.05 K/UL (ref 0.04–0.36)
EOSINOPHIL NFR BLD: 1.4 %
ERYTHROCYTE [DISTWIDTH] IN BLOOD BY AUTOMATED COUNT: 12.1 % (ref 11.7–14.4)
EST. AVERAGE GLUCOSE BLD GHB EST-MCNC: 105 MG/DL
GFR SERPL CREATININE-BSD FRML MDRD: >60 ML/MIN/1.73M*2
GLUCOSE SERPL-MCNC: 91 MG/DL (ref 70–99)
HBA1C MFR BLD HPLC: 5.3 %
HCT VFR BLDCO AUTO: 42.3 % (ref 35–45)
HDLC SERPL-MCNC: 58 MG/DL
HDLC SERPL: 3 {RATIO}
HGB BLD-MCNC: 13.9 G/DL (ref 11.8–15.7)
IMM GRANULOCYTES # BLD AUTO: 0.01 K/UL (ref 0–0.08)
IMM GRANULOCYTES NFR BLD AUTO: 0.3 %
LDLC SERPL CALC-MCNC: 82 MG/DL
LYMPHOCYTES # BLD: 1.27 K/UL (ref 1.2–3.5)
LYMPHOCYTES NFR BLD: 34.3 %
MCH RBC QN AUTO: 30.1 PG (ref 28–33.2)
MCHC RBC AUTO-ENTMCNC: 32.9 G/DL (ref 32.2–35.5)
MCV RBC AUTO: 91.6 FL (ref 83–98)
MONOCYTES # BLD: 0.42 K/UL (ref 0.28–0.8)
MONOCYTES NFR BLD: 11.4 %
NEUTROPHILS # BLD: 1.93 K/UL (ref 1.7–7)
NEUTS SEG NFR BLD: 52.1 %
NONHDLC SERPL-MCNC: 114 MG/DL
NRBC BLD-RTO: 0 %
PDW BLD AUTO: 11.1 FL (ref 9.4–12.3)
PLAT MORPH BLD: NORMAL
PLATELET # BLD AUTO: 187 K/UL (ref 150–369)
PLATELET # BLD EST: ABNORMAL 10*3/UL
POTASSIUM SERPL-SCNC: 4.5 MEQ/L (ref 3.6–5.1)
PROT SERPL-MCNC: 5.7 G/DL (ref 6–8.2)
RBC # BLD AUTO: 4.62 M/UL (ref 3.93–5.22)
SODIUM SERPL-SCNC: 138 MEQ/L (ref 136–144)
T4 FREE SERPL-MCNC: 0.84 NG/DL (ref 0.58–1.64)
TRIGL SERPL-MCNC: 161 MG/DL (ref 30–149)
TSH SERPL DL<=0.05 MIU/L-ACNC: 3.17 MIU/L (ref 0.34–5.6)
VIT B12 SERPL-MCNC: 1458 PG/ML (ref 180–914)
WBC # BLD AUTO: 3.7 K/UL (ref 3.8–10.5)

## 2022-06-24 PROCEDURE — 36415 COLL VENOUS BLD VENIPUNCTURE: CPT | Performed by: FAMILY MEDICINE

## 2022-06-24 PROCEDURE — 83036 HEMOGLOBIN GLYCOSYLATED A1C: CPT | Mod: GZ | Performed by: FAMILY MEDICINE

## 2022-06-24 PROCEDURE — 85025 COMPLETE CBC W/AUTO DIFF WBC: CPT | Performed by: FAMILY MEDICINE

## 2022-06-24 PROCEDURE — 82306 VITAMIN D 25 HYDROXY: CPT | Performed by: FAMILY MEDICINE

## 2022-06-24 PROCEDURE — 84439 ASSAY OF FREE THYROXINE: CPT | Performed by: FAMILY MEDICINE

## 2022-06-24 PROCEDURE — 82607 VITAMIN B-12: CPT | Performed by: FAMILY MEDICINE

## 2022-06-24 PROCEDURE — 80053 COMPREHEN METABOLIC PANEL: CPT | Performed by: FAMILY MEDICINE

## 2022-06-24 PROCEDURE — 80061 LIPID PANEL: CPT | Performed by: FAMILY MEDICINE

## 2022-06-24 PROCEDURE — 84443 ASSAY THYROID STIM HORMONE: CPT | Performed by: FAMILY MEDICINE

## 2022-06-24 NOTE — TELEPHONE ENCOUNTER
Spoke to pt - relayed lab results with Dr. SERRANO recommendation[s] - pt express understanding with no question[s].

## 2022-06-24 NOTE — TELEPHONE ENCOUNTER
----- Message from Renée Pittman DO sent at 6/24/2022  1:48 PM EDT -----  Anne Mariejoycelyn anaya. Please let her know her labs are reviewed and stable. B12 b12 level is slightly high and she can reduce b12 intakes to twice weekly instead. I would like repeat labs in 3-6 months. We can discuss at next appt thank you

## 2022-07-01 ENCOUNTER — OFFICE VISIT (OUTPATIENT)
Dept: INTERNAL MEDICINE | Facility: CLINIC | Age: 87
End: 2022-07-01
Payer: MEDICARE

## 2022-07-01 DIAGNOSIS — I10 ESSENTIAL HYPERTENSION: ICD-10-CM

## 2022-07-01 DIAGNOSIS — E78.5 HYPERLIPIDEMIA, UNSPECIFIED HYPERLIPIDEMIA TYPE: ICD-10-CM
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
## 2022-07-01 DIAGNOSIS — R58 ECCHYMOSIS: ICD-10-CM

## 2022-07-01 DIAGNOSIS — G43.809 OTHER MIGRAINE WITHOUT STATUS MIGRAINOSUS, NOT INTRACTABLE: ICD-10-CM

## 2022-07-01 DIAGNOSIS — I48.91 ATRIAL FIBRILLATION, UNSPECIFIED TYPE (CMS/HCC): Primary | ICD-10-CM

## 2022-07-01 DIAGNOSIS — E55.9 VITAMIN D DEFICIENCY: ICD-10-CM

## 2022-07-01 DIAGNOSIS — Z79.01 CHRONIC ANTICOAGULATION: ICD-10-CM

## 2022-07-01 DIAGNOSIS — E53.8 B12 DEFICIENCY: ICD-10-CM

## 2022-07-01 DIAGNOSIS — R00.2 PALPITATIONS: ICD-10-CM

## 2022-07-01 DIAGNOSIS — R79.9 ABNORMAL FINDING OF BLOOD CHEMISTRY, UNSPECIFIED: ICD-10-CM

## 2022-07-01 PROCEDURE — 99214 OFFICE O/P EST MOD 30 MIN: CPT | Performed by: FAMILY MEDICINE

## 2022-07-01 PROCEDURE — G8756 NO BP MEASURE DOC: HCPCS | Performed by: FAMILY MEDICINE

## 2022-07-01 RX ORDER — SUMATRIPTAN SUCCINATE 50 MG/1
50 TABLET ORAL ONCE AS NEEDED
Qty: 30 TABLET | Refills: 3 | Status: SHIPPED | OUTPATIENT
Start: 2022-07-01 | End: 2023-10-10 | Stop reason: SDUPTHER

## 2022-07-01 RX ORDER — METOPROLOL SUCCINATE 25 MG/1
25 TABLET, EXTENDED RELEASE ORAL DAILY
Qty: 90 TABLET | Refills: 1 | Status: SHIPPED | OUTPATIENT
Start: 2022-07-01 | End: 2023-02-20

## 2022-07-01 NOTE — PROGRESS NOTES
"            OFFICE VISIT NOTE JOHANNE BRAN DO        PATIENT NAME:Shalini Kunz  PATIENT : 1935  ANNE MARIE: 2022    CC: No chief complaint on file.  \"lab review\"        HPI   Shalini Kunz is a 86 y.o. female  With HTN, SVT and pyloric stricture seen as a NPV. She is a [facility] resident living with her .     #fall   2 weeks ago she fell while walking her sons dog. She had her hands full with sunglassess and walking stick. The dog tugged her and she fell to her right side onto a log. She was able to get back up. She has a large bruise to the region but denies confusion, hitting head, loc, diarrhea, constipation, fever, chills, hematuria, bloody stools, abdominal pain.     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 Completed for now  -- Cervical cancer screening:completed  -- Bone Health screening:completed  -- Vision Screening:dr tse   -- Hearing Screening:          The following have been reviewed and updated as appropriate in this visit: active problem list, medication list, allergies, family history, social history, health maintenance            Past Medical History:   Diagnosis Date   • " Anemia    • Arthritis     thumbs   • Deep vein thrombosis (CMS/HCC)     2019   • Hypertension    • Lightheadedness    • Lipid disorder    • Liver disease        Past Surgical History:   Procedure Laterality Date   • ABDOMINAL SURGERY      2019   • ADENOIDECTOMY Bilateral    • CHOLECYSTECTOMY  10/03/2011   • EYE SURGERY      2020    • GANGLION CYST EXCISION     • GASTRIC BYPASS     • TONSILLECTOMY       Social History     Socioeconomic History   • Marital status:      Spouse name: Not on file   • Number of children: Not on file   • Years of education: Not on file   • Highest education level: Not on file   Occupational History   • Not on file   Tobacco Use   • Smoking status: Never Smoker   • Smokeless tobacco: Never Used   Substance and Sexual Activity   • Alcohol use: Yes     Alcohol/week: 1.0 standard drink     Types: 1 Glasses of wine per week     Comment: half one at dinner    • Drug use: Not on file   • Sexual activity: Not on file   Other Topics Concern   • Not on file   Social History Narrative   • Not on file     Social Determinants of Health     Financial Resource Strain: Not on file   Food Insecurity: Not on file   Transportation Needs: Not on file   Physical Activity: Not on file   Stress: Not on file   Social Connections: Not on file   Intimate Partner Violence: Not on file   Housing Stability: Not on file         Family History   Problem Relation Age of Onset   • Alzheimer's disease Biological Mother    • Heart failure Biological Father    • No Known Problems Biological Sister          No Known Allergies      Current Outpatient Medications   Medication Sig Dispense Refill   • apixaban (ELIQUIS) 2.5 mg tablet Take 1 tablet (2.5 mg total) by mouth 2 (two) times a day. 90 tablet 3   • atorvastatin (LIPITOR) 10 mg tablet Take 0.5 tablets (5 mg total) by mouth daily. 45 tablet 1   • calcium carbonate-vitamin D3 (CALCIUM 600 WITH VITAMIN D3) 600 mg(1,500mg) -400 unit tablet,chewable Take 600 mg by mouth  once.     • cholecalciferol, vitamin D3, (cholecalciferol) 1,000 unit (25 mcg) tablet Take 1 tablet (1,000 Units total) by mouth daily. 90 tablet 3   • cyanocobalamin 1,000 mcg tablet Take 1 tablet (1,000 mcg total) by mouth daily. 90 tablet 3   • fexofenadine 180 mg tablet Take 180 mg by mouth once.     • iron, carbonyl 25 mg iron tablet Take 25 mg by mouth daily.     • metoprolol succinate XL (TOPROL-XL) 25 mg 24 hr tablet Take 1 tablet (25 mg total) by mouth daily. 90 tablet 1   • multivitamin capsule Take 1 capsule by mouth daily.     • omeprazole 40 mg capsule Take 40 mg by mouth daily.     • SUMAtriptan 50 mg tablet Take 50 mg by mouth once as needed.     • triamcinolone 55 mcg nasal inhaler Administer 2 sprays into affected nostril(s) daily as needed.         No current facility-administered medications for this visit.       Review of Systems  ROS  See hpi.   General: Denies fatigue, weight loss or weight gain.    Head: Denies headaches trauma   Eyes: Denies blurry vision, diplopia, decreased vision.  Denies drainage or excessive tearing.  Ears: Denies tinnitis, decreased hearing, ear drainage  Nose: Denies rhinorrhea, epistaxi  Mouth: Denies dry mouth  Neck: Denies lumps, bumps.  Denies dysphagia, odynophagia  Pulmonary:  Denies shortness of breath, difficulty breathing, excessive drooling, change in sputum.   Cardiac: Denies chest pain, flip-flop sensation   GI/Abdomen: Denies vomit, nausea, diarrhea, constipation, hematochezia.  Denies abdominal pain.    Uro/: Denies hematuria, urgency, frequency, burning sensation with urination. Denies discharge.   Skin: Denies lumps, bumps, rash. +ecchymosis   MSK:  Denies weakness, numbness, tingling.    Neuro: Denies confusion, vertigo.              VITALS  There were no vitals filed for this visit.  114/60 98F hr 73 98%RA        Wt Readings from Last 3 Encounters:   02/23/22 67.6 kg (149 lb)   11/10/21 66.2 kg (146 lb)       Ht Readings from Last 3 Encounters:  "  02/23/22 1.676 m (5' 6\")   11/10/21 1.676 m (5' 6\")       BP Readings from Last 3 Encounters:   11/10/21 130/90       Physical Exam  PHYSICAL EXAM  General: Appears well, in no distress. Pt is pleasant. Hygiene normal.  Head: NC/AT.   Eyes: Conjunctiva and sclera normal bilaterally. No lid-lag.  Neck: Inspection normal. Trachea midline. supple, FROM without pain. No cervical lymphadenopathy.  No enlargement of thyroid.  Cardiac: regular, rate, and rhythm. No murmurs, rubs, or gallops.  Lungs: negative for respiratory distress with normal respiratory effort. Lungs clear to auscultation bilaterally. No wheezes, rales, or rhonchi. No intercostal retractions  Abdomen: +BS. Bowel sounds normal. Abdomen is soft, non-distended. No tenderness. No masses or organomegaly. No guarding. Ecchymosis right LQ and right side and RL back. Purple blue slight tenderness to palpation  Skin: warm and dry. No erythema or rash.  Extremities: No peripheral edema or extremity lymphadenopathy  MSK: 5/5 UE and LE b/l. Gait stable and intact. Sitting Balance stable.   Psych: linear. Normal mood and affect.  No SI/HI. AAOX3.           PERTINENT LABS, IMAGING    No new labs.  Lab Results   Component Value Date    WBC 3.70 (L) 06/24/2022    HGB 13.9 06/24/2022    HCT 42.3 06/24/2022    MCV 91.6 06/24/2022     06/24/2022         Chemistry        Component Value Date/Time     06/24/2022 0842    K 4.5 06/24/2022 0842     06/24/2022 0842    CO2 23 06/24/2022 0842    BUN 15 06/24/2022 0842    CREATININE 0.8 06/24/2022 0842        Component Value Date/Time    CALCIUM 9.6 06/24/2022 0842    ALKPHOS 103 06/24/2022 0842    AST 22 06/24/2022 0842    ALT 16 06/24/2022 0842    BILITOT 0.9 06/24/2022 0842            Lab Results   Component Value Date    CHOL 172 06/24/2022    CHOL 173 12/07/2021     Lab Results   Component Value Date    HDL 58 06/24/2022    HDL 52 (L) 12/07/2021     Lab Results   Component Value Date    LDLCALC 82 " 06/24/2022    LDLCALC 86 12/07/2021     Lab Results   Component Value Date    TRIG 161 (H) 06/24/2022    TRIG 174 (H) 12/07/2021     No results found for: CHOLHDL    Lab Results   Component Value Date    TSH 3.17 06/24/2022       Lab Results   Component Value Date    HGBA1C 5.3 06/24/2022       No results found for: HAV, HEPAIGM, HEPBIGM, HEPBCAB, HBEAG, HEPCAB    No results found for: MICROALBUR, [id number]    No results found for this or any previous visit (from the past 24 hour(s)).    No results found.            Immunization History   Administered Date(s) Administered   • Pneumococcal Conjugate 13-Valent 09/11/2017   • Pneumococcal Polysaccharide 04/02/2021   • Sars-cov-2 (Covid-19) Vaccine, Pfizer 02/10/2021, 03/24/2021   • Tdap 10/13/2008   • Zoster 05/26/2009         Health Maintenance   Topic Date Due   • DEXA Scan  Never done   • Medicare Annual Wellness Visit  Never done   • Hepatitis C Screening  Never done   • Zoster Vaccine (2 of 3) 07/21/2009   • DTaP, Tdap, and Td Vaccines (2 - Td or Tdap) 10/13/2018   • COVID-19 Vaccine (3 - Booster for Pfizer series) 08/24/2021   • Annual Falls Risk Screening  Never done   • Influenza Vaccine (1) 08/01/2022   • Pneumococcal (65 years and older)  Completed   • Meningococcal ACWY  Aged Out   • HIB Vaccines  Aged Out   • IPV Vaccines  Aged Out   • HPV Vaccines  Aged Out            Diagnosis Plan   1. Atrial fibrillation, unspecified type (CMS/HCC)     2. Chronic anticoagulation     3. Hyperlipidemia, unspecified hyperlipidemia type     4. B12 deficiency     5. Vitamin D deficiency     6. Palpitations     7. Essential hypertension     8. Ecchymosis     9. Fall, initial encounter     10. Other migraine without status migrainosus, not intractable         Shalini Kunz is a 86 y.o. female  With HTN, SVT and pyloric stricture seen as a NPV. She is a [facility] resident living with her .   Today we reviewed 6/24/22 labs excellent. Fall stable precautions reviewed. Med  refill    #pfall with right bruise   mechanical   counseling provided   if worsening return for imaging and further work up    #afib  #HTN  - dr duque cardiology  - eliquis 2.5 po bid, metoprolol 25mg po daily    #hx of PE    #HLD- lipitor 5mg po daily  Recent level stable   check level     #vitamin D def- dec level 25 takign 1000 IU po daily  Repeat level    #b12 def- level 195 low dec 2021 taking 1000 IU po daily and reduce to QOD as normal high now  Repeat level    #migraine- prn sumatriptan 50mg working well    #GOO sp lap gastro-jejunostomy dr Sy July 2019   #pyloric stenosis- dilation in past    #sp CCY      Return in about 1 year (around 7/1/2023), or if symptoms worsen or fail to improve, for Followup with Dr. Renée Pittman.    Renée Pittman, DO

## 2022-08-08 NOTE — TELEPHONE ENCOUNTER
Problem: Potential for Falls  Goal: Patient will remain free of falls  Description: INTERVENTIONS:  - Educate patient/family on patient safety including physical limitations  - Instruct patient to call for assistance with activity   - Consult OT/PT to assist with strengthening/mobility   - Keep Call bell within reach  - Keep bed low and locked with side rails adjusted as appropriate  - Keep care items and personal belongings within reach  - Initiate and maintain comfort rounds  - Make Fall Risk Sign visible to staff  - Offer Toileting every 2 Hours, in advance of need  - Initiate/Maintain bed alarm  - Obtain necessary fall risk management equipment: non skid footwear and call bell within reach  - Apply yellow socks and bracelet for high fall risk patients  - Consider moving patient to room near nurses station  Outcome: Progressing     Problem: MOBILITY - ADULT  Goal: Maintain or return to baseline ADL function  Description: INTERVENTIONS:  -  Assess patient's ability to carry out ADLs; assess patient's baseline for ADL function and identify physical deficits which impact ability to perform ADLs (bathing, care of mouth/teeth, toileting, grooming, dressing, etc )  - Assess/evaluate cause of self-care deficits   - Assess range of motion  - Assess patient's mobility; develop plan if impaired  - Assess patient's need for assistive devices and provide as appropriate  - Encourage maximum independence but intervene and supervise when necessary  - Involve family in performance of ADLs  - Assess for home care needs following discharge   - Consider OT consult to assist with ADL evaluation and planning for discharge  - Provide patient education as appropriate  Outcome: Progressing  Goal: Maintains/Returns to pre admission functional level  Description: INTERVENTIONS:  - Perform BMAT or MOVE assessment daily    - Set and communicate daily mobility goal to care team and patient/family/caregiver     - Collaborate with Was prescribed by PCP    Sent to DR BRAN   rehabilitation services on mobility goals if consulted  - Perform Range of Motion 3 times a day  - Reposition patient every 3 hours    - Dangle patient 3 times a day  - Stand patient 3 times a day  - Ambulate patient 3 times a day  - Out of bed to chair 3 times a day   - Out of bed for meals 3 times a day  - Out of bed for toileting  - Record patient progress and toleration of activity level   Outcome: Progressing     Problem: PAIN - ADULT  Goal: Verbalizes/displays adequate comfort level or baseline comfort level  Description: Interventions:  - Encourage patient to monitor pain and request assistance  - Assess pain using appropriate pain scale  - Administer analgesics based on type and severity of pain and evaluate response  - Implement non-pharmacological measures as appropriate and evaluate response  - Consider cultural and social influences on pain and pain management  - Notify physician/advanced practitioner if interventions unsuccessful or patient reports new pain  Outcome: Progressing     Problem: INFECTION - ADULT  Goal: Absence or prevention of progression during hospitalization  Description: INTERVENTIONS:  - Assess and monitor for signs and symptoms of infection  - Monitor lab/diagnostic results  - Monitor all insertion sites, i e  indwelling lines, tubes, and drains  - Monitor endotracheal if appropriate and nasal secretions for changes in amount and color  - [place] appropriate cooling/warming therapies per order  - Administer medications as ordered  - Instruct and encourage patient and family to use good hand hygiene technique  - Identify and instruct in appropriate isolation precautions for identified infection/condition  Outcome: Progressing  Goal: Absence of fever/infection during neutropenic period  Description: INTERVENTIONS:  - Monitor WBC    Outcome: Progressing     Problem: SAFETY ADULT  Goal: Patient will remain free of falls  Description: INTERVENTIONS:  - Educate patient/family on patient safety including physical limitations  - Instruct patient to call for assistance with activity   - Consult OT/PT to assist with strengthening/mobility   - Keep Call bell within reach  - Keep bed low and locked with side rails adjusted as appropriate  - Keep care items and personal belongings within reach  - Initiate and maintain comfort rounds  - Make Fall Risk Sign visible to staff  - Apply yellow socks and bracelet for high fall risk patients  - Consider moving patient to room near nurses station  Outcome: Progressing  Goal: Maintain or return to baseline ADL function  Description: INTERVENTIONS:  -  Assess patient's ability to carry out ADLs; assess patient's baseline for ADL function and identify physical deficits which impact ability to perform ADLs (bathing, care of mouth/teeth, toileting, grooming, dressing, etc )  - Assess/evaluate cause of self-care deficits   - Assess range of motion  - Assess patient's mobility; develop plan if impaired  - Assess patient's need for assistive devices and provide as appropriate  - Encourage maximum independence but intervene and supervise when necessary  - Involve family in performance of ADLs  - Assess for home care needs following discharge   - Consider OT consult to assist with ADL evaluation and planning for discharge  - Provide patient education as appropriate  Outcome: Progressing  Goal: Maintains/Returns to pre admission functional level  Description: INTERVENTIONS:  - Perform BMAT or MOVE assessment daily    - Set and communicate daily mobility goal to care team and patient/family/caregiver     - Collaborate with rehabilitation services on mobility goals if consulted  - Out of bed for toileting  - Record patient progress and toleration of activity level   Outcome: Progressing     Problem: DISCHARGE PLANNING  Goal: Discharge to home or other facility with appropriate resources  Description: INTERVENTIONS:  - Identify barriers to discharge w/patient and caregiver  - Arrange for needed discharge resources and transportation as appropriate  - Identify discharge learning needs (meds, wound care, etc )  - Arrange for interpretive services to assist at discharge as needed  - Refer to Case Management Department for coordinating discharge planning if the patient needs post-hospital services based on physician/advanced practitioner order or complex needs related to functional status, cognitive ability, or social support system  Outcome: Progressing     Problem: Knowledge Deficit  Goal: Patient/family/caregiver demonstrates understanding of disease process, treatment plan, medications, and discharge instructions  Description: Complete learning assessment and assess knowledge base    Interventions:  - Provide teaching at level of understanding  - Provide teaching via preferred learning methods  Outcome: Progressing

## 2022-08-23 ENCOUNTER — OFFICE VISIT (OUTPATIENT)
Dept: CARDIOLOGY | Facility: CLINIC | Age: 87
End: 2022-08-23
Payer: MEDICARE

## 2022-08-23 VITALS
HEART RATE: 58 BPM | SYSTOLIC BLOOD PRESSURE: 124 MMHG | BODY MASS INDEX: 25.18 KG/M2 | WEIGHT: 156 LBS | DIASTOLIC BLOOD PRESSURE: 60 MMHG | OXYGEN SATURATION: 99 %

## 2022-08-23 DIAGNOSIS — Z79.01 CHRONIC ANTICOAGULATION: ICD-10-CM

## 2022-08-23 DIAGNOSIS — R00.2 PALPITATIONS: Primary | ICD-10-CM

## 2022-08-23 DIAGNOSIS — E78.2 MIXED HYPERLIPIDEMIA: ICD-10-CM

## 2022-08-23 DIAGNOSIS — I48.0 PAROXYSMAL ATRIAL FIBRILLATION (CMS/HCC): ICD-10-CM

## 2022-08-23 DIAGNOSIS — I10 ESSENTIAL HYPERTENSION: ICD-10-CM

## 2022-08-23 PROCEDURE — G8754 DIAS BP LESS 90: HCPCS | Performed by: INTERNAL MEDICINE

## 2022-08-23 PROCEDURE — 99213 OFFICE O/P EST LOW 20 MIN: CPT | Performed by: INTERNAL MEDICINE

## 2022-08-23 PROCEDURE — 93000 ELECTROCARDIOGRAM COMPLETE: CPT | Performed by: INTERNAL MEDICINE

## 2022-08-23 PROCEDURE — G8752 SYS BP LESS 140: HCPCS | Performed by: INTERNAL MEDICINE

## 2022-08-23 NOTE — PROGRESS NOTES
August 28, 2022      Patient: Shalini Kunz   YOB: 1935   Date of Visit 8/23/2022   [facility]# [id number]   MRN# [id number]       REPORT TYPE: Office Letter    DATE OF SERVICE: 08/23/2022    Renée Pittman DO    Dear Renée:    Shalini Kunz was in our cardiology outpatient offices today for a follow-up appointment.  Generally, she has done well.  She continues to complain of some lightheadedness and fatigue, but I do not believe that either of those symptoms are on a cardiovascular basis.  We reviewed all of her records and she has had normal studies in the past without evidence of ischemic disease.  She is taking APIXABAN 2.5 mg twice per day for stroke prophylaxis.  She had been taking 5 mg, but she had a bleeding issue and the dose was reduced.  This is close to an acceptable dose for her given her age, body size, and borderline reduced renal function.  Her other medicines are METOPROLOL, OMEPRAZOLE, VITAMINS, and ATORVASTATIN.  She has been able to go about her usual activities, including exercise on a regular basis, and looked to be in good physical condition and in good spirits.  There were no other cardiovascular issues or complaints.    On physical examination, blood pressure was 122/60 with a heart rate of 58 beats per minute and her rhythm was regular without ectopic beats.  Respiratory rate was 18 and she weighed 148 pounds, 8 pounds less than last time.  Her cardiopulmonary examination was unremarkable.  She had normal heart tones, clear lung fields, no signs of volume overload or peripheral vascular compromise.  Her general physical examination was noncontributory. Electrocardiogram showed sinus rhythm and it was a normal tracing as it was before. I reviewed laboratory studies that were drawn for her in 06/2022 that were mostly normal with, as I said, borderline renal function and a creatinine of 0.8, but acceptable lipids, a normal TSH, and a normal CBC.    Ms. Kunz is stable, and so I did not  change her medicines.  I did order an echocardiogram.  It has been a few years since her last study, and we want to be doubly certain that there has been no change in left ventricular function or the development of any structural disease.  If all is well, she will return to our offices in about 6 months for a surveillance visit, but we will be available to see her sooner or to address issues as necessary.  Ms. Kunz is comfortable with this arrangement.    I spent approximately 25 minutes with Ms. Kunz's case this morning, taking a history, performing a physical examination, interpreting her electrocardiogram, going through her medication list, reviewing previous diagnostic and laboratory studies, ordering a new echocardiogram, making arrangements for follow-up, answering questions, and documenting all of the above.    I am pleased that Ms. Kunz is stable on her current program. Thank you once again for sharing her care with us.    Best regards,      Adrian Jo MD    DD: 08/23/2022 12:05  DT: 08/23/2022 13:07  Voice ID: [id number]/Report ID: [id number]  ns

## 2022-11-18 ENCOUNTER — LAB REQUISITION (OUTPATIENT)
Dept: LAB | Facility: HOSPITAL | Age: 87
End: 2022-11-18
Attending: OPHTHALMOLOGY
Payer: MEDICARE

## 2022-11-18 DIAGNOSIS — H54.3 UNQUALIFIED VISUAL LOSS, BOTH EYES: ICD-10-CM

## 2022-11-18 DIAGNOSIS — H46.9 UNSPECIFIED OPTIC NEURITIS: ICD-10-CM

## 2022-11-18 LAB
CREAT SERPL-MCNC: 0.9 MG/DL (ref 0.6–1.1)
GFR SERPL CREATININE-BSD FRML MDRD: 59.5 ML/MIN/1.73M*2

## 2022-11-18 PROCEDURE — 36415 COLL VENOUS BLD VENIPUNCTURE: CPT | Performed by: OPHTHALMOLOGY

## 2022-11-18 PROCEDURE — 82565 ASSAY OF CREATININE: CPT | Performed by: OPHTHALMOLOGY

## 2022-12-02 ENCOUNTER — TELEPHONE (OUTPATIENT)
Dept: PRIMARY CARE | Facility: CLINIC | Age: 87
End: 2022-12-02
Payer: MEDICARE

## 2022-12-02 RX ORDER — BENZONATATE 200 MG/1
200 CAPSULE ORAL 3 TIMES DAILY PRN
Qty: 30 CAPSULE | Refills: 0 | Status: SHIPPED | OUTPATIENT
Start: 2022-12-02 | End: 2022-12-12

## 2022-12-19 ENCOUNTER — TRANSCRIBE ORDERS (OUTPATIENT)
Dept: SCHEDULING | Age: 87
End: 2022-12-19

## 2022-12-19 DIAGNOSIS — H46.9 UNSPECIFIED OPTIC NEURITIS: Primary | ICD-10-CM

## 2023-01-03 ENCOUNTER — HOSPITAL ENCOUNTER (OUTPATIENT)
Dept: RADIOLOGY | Age: 88
Discharge: HOME | End: 2023-01-03
Attending: OPHTHALMOLOGY
Payer: MEDICARE

## 2023-01-03 DIAGNOSIS — H46.9 UNSPECIFIED OPTIC NEURITIS: ICD-10-CM

## 2023-01-03 RX ORDER — GADOBUTROL 604.72 MG/ML
6.6 INJECTION INTRAVENOUS ONCE
Status: COMPLETED | OUTPATIENT
Start: 2023-01-03 | End: 2023-01-03

## 2023-01-03 RX ADMIN — GADOBUTROL 6.6 ML: 604.72 INJECTION INTRAVENOUS at 12:02

## 2023-03-07 ENCOUNTER — HOSPITAL ENCOUNTER (OUTPATIENT)
Dept: CARDIOLOGY | Facility: CLINIC | Age: 88
Discharge: HOME | End: 2023-03-07
Payer: MEDICARE

## 2023-03-07 ENCOUNTER — OFFICE VISIT (OUTPATIENT)
Dept: CARDIOLOGY | Facility: CLINIC | Age: 88
End: 2023-03-07
Payer: MEDICARE

## 2023-03-07 VITALS
BODY MASS INDEX: 23.95 KG/M2 | SYSTOLIC BLOOD PRESSURE: 132 MMHG | OXYGEN SATURATION: 96 % | DIASTOLIC BLOOD PRESSURE: 70 MMHG | WEIGHT: 149 LBS | HEART RATE: 54 BPM | RESPIRATION RATE: 18 BRPM | HEIGHT: 66 IN

## 2023-03-07 VITALS
WEIGHT: 149 LBS | DIASTOLIC BLOOD PRESSURE: 70 MMHG | BODY MASS INDEX: 23.95 KG/M2 | SYSTOLIC BLOOD PRESSURE: 145 MMHG | HEIGHT: 66 IN

## 2023-03-07 DIAGNOSIS — I48.0 PAROXYSMAL ATRIAL FIBRILLATION (CMS/HCC): ICD-10-CM

## 2023-03-07 DIAGNOSIS — I10 ESSENTIAL HYPERTENSION: ICD-10-CM

## 2023-03-07 DIAGNOSIS — R00.2 PALPITATIONS: Primary | ICD-10-CM

## 2023-03-07 DIAGNOSIS — E78.2 MIXED HYPERLIPIDEMIA: ICD-10-CM

## 2023-03-07 DIAGNOSIS — I05.9 MITRAL VALVE DISEASE: ICD-10-CM

## 2023-03-07 DIAGNOSIS — I35.9 AORTIC VALVE DISEASE: ICD-10-CM

## 2023-03-07 LAB
AORTIC ROOT ANNULUS: 3.8 CM
ASCENDING AORTA: 3.5 CM
AV PEAK GRADIENT: 9 MMHG
AV PEAK VELOCITY-S: 1.46 M/S
AV REG PEAK VEL: 4.75 M/S
AV REGURGITATION PRESSURE HALF TIME: 515 MS
AV VALVE AREA: 1.64 CM2
BSA FOR ECHO PROCEDURE: 1.77 M2
E WAVE DECELERATION TIME: 268 MS
E/A RATIO: 0.7
E/E' RATIO: 12.3
E/LAT E' RATIO: 8.7
EDV (BP): 57.9 CM3
EF (A4C): 56.8 %
EF A2C: 58.6 %
EJECTION FRACTION: 55.3 %
EST RIGHT VENT SYSTOLIC PRESSURE BY TRICUSPID REGURGITATION JET: 27 MMHG
ESV (BP): 25.9 CM3
FRACTIONAL SHORTENING: 29.6 %
INTERVENTRICULAR SEPTUM: 0.87 CM
LA ESV (BP): 54.9 CM3
LA ESV INDEX (A2C): 43.11 CM3/M2
LA ESV INDEX (BP): 31.02 CM3/M2
LA/AORTA RATIO: 0.68
LAAS-AP2: 24.1 CM2
LAAS-AP4: 17.1 CM2
LAD 2D: 2.6 CM
LALD A4C: 5.71 CM
LALD A4C: 5.92 CM
LAV-S: 76.3 CM3
LEFT ATRIUM VOLUME INDEX: 21.86 CM3/M2
LEFT ATRIUM VOLUME: 38.7 CM3
LEFT INTERNAL DIMENSION IN SYSTOLE: 3.14 CM (ref 2.45–3.7)
LEFT VENTRICLE DIASTOLIC VOLUME INDEX: 33.84 CM3/M2
LEFT VENTRICLE DIASTOLIC VOLUME: 59.9 CM3
LEFT VENTRICLE SYSTOLIC VOLUME INDEX: 14.63 CM3/M2
LEFT VENTRICLE SYSTOLIC VOLUME: 25.9 CM3
LEFT VENTRICULAR INTERNAL DIMENSION IN DIASTOLE: 4.46 CM (ref 4.13–5.73)
LEFT VENTRICULAR POSTERIOR WALL IN END DIASTOLE: 1.01 CM (ref 0.54–1.01)
LV DIASTOLIC VOLUME: 56.5 CM3
LV ESV (APICAL 2 CHAMBER): 23.5 CM3
LVAD-AP2: 23 CM2
LVAD-AP4: 22.9 CM2
LVAS-AP2: 11.8 CM2
LVAS-AP4: 13.3 CM2
LVEDVI(A2C): 31.92 CM3/M2
LVEDVI(BP): 32.71 CM3/M2
LVESVI(A2C): 13.28 CM3/M2
LVESVI(BP): 14.63 CM3/M2
LVLD-AP2: 7.53 CM
LVLD-AP4: 7.58 CM
LVLS-AP2: 5.43 CM
LVLS-AP4: 6.19 CM
LVOT 2D: 2 CM
LVOT A: 3.14 CM2
LVOT PEAK VELOCITY: 0.97 M/S
LVOT PG: 4 MMHG
MLH CV ECHO AVA INDEX VELOCITY RATIO: 0.9
MV E'TISSUE VEL-LAT: 0.08 M/S
MV E'TISSUE VEL-MED: 0.05 M/S
MV PEAK A VEL: 0.92 M/S
MV PEAK E VEL: 0.65 M/S
POSTERIOR WALL: 1.01 CM
PULMONARY REGURGITATION LATE DIASTOLIC VELOCITY: 0.93 M/S
PV PEAK GRADIENT: 3 MMHG
PV PV: 0.86 M/S
RAP: 5 MMHG
RVOT VMAX: 0.8 M/S
RVOT VTI: 17.9 CM
SEPTAL TISSUE DOPPLER FREE WALL LATE DIA VELOCITY (APICAL 4 CHAMBER VIEW): 0.1 M/S
TR MAX PG: 22.09 MMHG
TRICUSPID VALVE PEAK REGURGITATION VELOCITY: 2.35 M/S
Z-SCORE OF LEFT VENTRICULAR DIMENSION IN END DIASTOLE: -0.87
Z-SCORE OF LEFT VENTRICULAR DIMENSION IN END SYSTOLE: 0.33
Z-SCORE OF LEFT VENTRICULAR POSTERIOR WALL IN END DIASTOLE: 1.64

## 2023-03-07 PROCEDURE — 99213 OFFICE O/P EST LOW 20 MIN: CPT | Performed by: INTERNAL MEDICINE

## 2023-03-07 PROCEDURE — 93000 ELECTROCARDIOGRAM COMPLETE: CPT | Performed by: INTERNAL MEDICINE

## 2023-03-07 PROCEDURE — 93306 TTE W/DOPPLER COMPLETE: CPT | Performed by: INTERNAL MEDICINE

## 2023-03-07 ASSESSMENT — CHADS2 SCORE
HYPERTENSION: NO
CHF: NO
SEX: FEMALE (+1PT.)
VASCULAR DISEASE: NO
PRIOR STROKE OR TIA OR THROMBOEMBOLISM: NO
DIABETES: NO
AGE: 75+ (+2 PT.)
CHADS2 SCORE: 3

## 2023-03-08 NOTE — PROGRESS NOTES
March 11, 2023      Patient: Shalini Kunz   YOB: 1935   Date of Visit 3/7/2023   [facility]# [id number]   MRN# [id number]       REPORT TYPE: Office Letter    DATE OF SERVICE: 03/07/2023    RENÉE BRAN DO    Dear Renée,    I saw Shalini Kunz in our cardiology outpatient offices today in follow-up.  Generally, she has done well without any clinical recurrences of atrial fibrillation.  She has had no symptoms to suggest heart failure or ischemia.  She had an echocardiogram this morning.  She did not have evidence of left ventricular dysfunction, but she did have mild to moderate mitral and aortic valve insufficiency.  This has not caused any symptoms or caused deterioration in hemodynamics.  Her medicines have remained essentially the same and include IMITREX, OMEPRAZOLE, METOPROLOL, FEXOFENADINE, ATORVASTATIN, CALCIUM and an attenuated dose of APIXABAN of 2.5 mg twice a day.  She has been adherent to her medical program and had no other cardiovascular issues.  She did have a number of questions about her 's health that we discussed at length.    On physical examination, blood pressure was 132/70 with a heart rate of 54 beats per minute and her rhythm was regular without ectopic beats.  Respiratory rate was 18 and she weighed 149 pounds, 7 pounds less than last time.  Her lung fields were clear.  She did not have jugular venous distention or carotid bruits.  The cardiac examination revealed a soft aortic insufficiency murmur at the aortic area radiating to the base, but I did not hear any systolic murmurs.  She had no abdominal findings nor did she have any peripheral edema and her pulses were palpable. Electrocardiogram showed sinus rhythm with low voltage P-waves and normal electrocardiographic intervals.    Ms. Kunz is stable from our perspective and so I did not change her medicines or order any further diagnostic studies.  I encouraged her to maintain her usual contact with you to include  whatever laboratory testing you deem appropriate.  Her next appointment at our offices should occur in late summer or fall.  I will be withdrawing from clinical practice in July and so that appointment will be with Dr. Eleni Storm who will be joining our group.  Dr. Storm will have access to all of Ms. Kunz's medical records and I am sure the transition will be seamless.  However, I will be available to answer questions or to address concerns during this very important transition of care.    I spent approximately 30 minutes with Ms. Kunz's case this morning, taking a history, performing a physical examination, interpreting her electrocardiogram, going through her medication list, answering a number of questions, reviewing the results of her diagnostic study today, suggesting follow-up with Dr. Storm, and documenting all of the above.    I am pleased that Ms. Kunz is stable and I wish you and Dr. Storm all the best in her continuing management.    Best regards,      Adrian Jo MD    DD: 03/07/2023 12:25  DT: 03/07/2023 19:17  Voice ID: [id number]/Report ID: [id number]  am

## 2023-03-30 ENCOUNTER — TELEPHONE (OUTPATIENT)
Dept: INTERNAL MEDICINE | Facility: CLINIC | Age: 88
End: 2023-03-30
Payer: MEDICARE

## 2023-03-30 NOTE — TELEPHONE ENCOUNTER
Dr CANTOR  Name  ISAAC GALARZA  Tele # [phone]    BRIGID GALARZA  35  Msg  PLEAS CALL AGAIN TO DISCUSS CATSCAN       RESULTS FROM YESTERDAY

## 2023-03-31 ENCOUNTER — TELEPHONE (OUTPATIENT)
Dept: INTERNAL MEDICINE | Facility: CLINIC | Age: 88
End: 2023-03-31
Payer: MEDICARE

## 2023-03-31 NOTE — TELEPHONE ENCOUNTER
ISAAC GALARZA - WIFE  Tele # [phone]    BRIGID GEOVANNI 35  Msg  PLEASE CALL AGAIN TODAY,       TO DISCUSS CATSCAN RESULTS

## 2023-07-14 ENCOUNTER — LAB REQUISITION (OUTPATIENT)
Dept: LAB | Facility: HOSPITAL | Age: 88
End: 2023-07-14
Payer: MEDICARE

## 2023-07-14 DIAGNOSIS — I48.91 UNSPECIFIED ATRIAL FIBRILLATION (CMS/HCC): ICD-10-CM
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
## 2023-07-14 DIAGNOSIS — R00.2 PALPITATIONS: ICD-10-CM

## 2023-07-14 DIAGNOSIS — Z79.01 LONG TERM (CURRENT) USE OF ANTICOAGULANTS: ICD-10-CM

## 2023-07-14 DIAGNOSIS — I10 ESSENTIAL (PRIMARY) HYPERTENSION: ICD-10-CM

## 2023-07-14 DIAGNOSIS — G43.809 OTHER MIGRAINE, NOT INTRACTABLE, WITHOUT STATUS MIGRAINOSUS: ICD-10-CM

## 2023-07-14 LAB
25(OH)D3 SERPL-MCNC: 32 NG/ML (ref 30–100)
ALBUMIN SERPL-MCNC: 4.1 G/DL (ref 3.5–5.7)
ALP SERPL-CCNC: 95 IU/L (ref 34–104)
ALT SERPL-CCNC: 15 IU/L (ref 7–52)
ANION GAP SERPL CALC-SCNC: 7 MEQ/L (ref 3–15)
AST SERPL-CCNC: 21 IU/L (ref 13–39)
BASOPHILS # BLD: 0.02 K/UL (ref 0.01–0.1)
BASOPHILS NFR BLD: 0.4 %
BILIRUB SERPL-MCNC: 0.6 MG/DL (ref 0.3–1)
BUN SERPL-MCNC: 15 MG/DL (ref 7–25)
CALCIUM SERPL-MCNC: 9.3 MG/DL (ref 8.6–10.3)
CHLORIDE SERPL-SCNC: 107 MEQ/L (ref 98–107)
CHOLEST SERPL-MCNC: 170 MG/DL
CO2 SERPL-SCNC: 26 MEQ/L (ref 21–31)
CREAT SERPL-MCNC: 0.8 MG/DL (ref 0.6–1.2)
DIFFERENTIAL METHOD BLD: ABNORMAL
EOSINOPHIL # BLD: 0.1 K/UL (ref 0.04–0.36)
EOSINOPHIL NFR BLD: 2.2 %
ERYTHROCYTE [DISTWIDTH] IN BLOOD BY AUTOMATED COUNT: 11.8 % (ref 11.7–14.4)
EST. AVERAGE GLUCOSE BLD GHB EST-MCNC: 108 MG/DL
GFR SERPL CREATININE-BSD FRML MDRD: >60 ML/MIN/1.73M*2
GLUCOSE SERPL-MCNC: 87 MG/DL (ref 70–99)
HBA1C MFR BLD: 5.4 %
HCT VFR BLDCO AUTO: 45.5 % (ref 35–45)
HDLC SERPL-MCNC: 52 MG/DL
HDLC SERPL: 3.3 {RATIO}
HGB BLD-MCNC: 14.8 G/DL (ref 11.8–15.7)
IMM GRANULOCYTES # BLD AUTO: 0.01 K/UL (ref 0–0.08)
IMM GRANULOCYTES NFR BLD AUTO: 0.2 %
LDLC SERPL CALC-MCNC: 71 MG/DL
LYMPHOCYTES # BLD: 1.4 K/UL (ref 1.2–3.5)
LYMPHOCYTES NFR BLD: 30.3 %
MCH RBC QN AUTO: 30.6 PG (ref 28–33.2)
MCHC RBC AUTO-ENTMCNC: 32.5 G/DL (ref 32.2–35.5)
MCV RBC AUTO: 94.2 FL (ref 83–98)
MONOCYTES # BLD: 0.49 K/UL (ref 0.28–0.8)
MONOCYTES NFR BLD: 10.6 %
NEUTROPHILS # BLD: 2.6 K/UL (ref 1.7–7)
NEUTS SEG NFR BLD: 56.3 %
NONHDLC SERPL-MCNC: 118 MG/DL
NRBC BLD-RTO: 0 %
PDW BLD AUTO: 11.1 FL (ref 9.4–12.3)
PLATELET # BLD AUTO: 187 K/UL (ref 150–369)
POTASSIUM SERPL-SCNC: 4.6 MEQ/L (ref 3.5–5.1)
PROT SERPL-MCNC: 6.3 G/DL (ref 6.4–8.9)
RBC # BLD AUTO: 4.83 M/UL (ref 3.93–5.22)
SODIUM SERPL-SCNC: 140 MEQ/L (ref 136–145)
TRIGL SERPL-MCNC: 237 MG/DL
TSH SERPL DL<=0.05 MIU/L-ACNC: 3.52 MIU/L (ref 0.34–5.6)
VIT B12 SERPL-MCNC: 558 PG/ML (ref 180–914)
WBC # BLD AUTO: 4.62 K/UL (ref 3.8–10.5)

## 2023-07-14 PROCEDURE — 83036 HEMOGLOBIN GLYCOSYLATED A1C: CPT | Mod: GZ | Performed by: FAMILY MEDICINE

## 2023-07-14 PROCEDURE — 36415 COLL VENOUS BLD VENIPUNCTURE: CPT | Performed by: FAMILY MEDICINE

## 2023-07-14 PROCEDURE — 80061 LIPID PANEL: CPT | Performed by: FAMILY MEDICINE

## 2023-07-14 PROCEDURE — 82306 VITAMIN D 25 HYDROXY: CPT | Performed by: FAMILY MEDICINE

## 2023-07-14 PROCEDURE — 84443 ASSAY THYROID STIM HORMONE: CPT | Performed by: FAMILY MEDICINE

## 2023-07-14 PROCEDURE — 80053 COMPREHEN METABOLIC PANEL: CPT | Performed by: FAMILY MEDICINE

## 2023-07-14 PROCEDURE — 85025 COMPLETE CBC W/AUTO DIFF WBC: CPT | Performed by: FAMILY MEDICINE

## 2023-07-14 PROCEDURE — 82607 VITAMIN B-12: CPT | Performed by: FAMILY MEDICINE

## 2023-07-14 NOTE — RESULT ENCOUNTER NOTE
Please let this [facility] resident know that her labs are reviewed and stable without concerns we can discuss details at her next office appointment

## 2023-07-17 ENCOUNTER — TELEPHONE (OUTPATIENT)
Dept: PRIMARY CARE | Facility: CLINIC | Age: 88
End: 2023-07-17
Payer: MEDICARE

## 2023-07-17 NOTE — TELEPHONE ENCOUNTER
----- Message from Renée Pittman DO sent at 7/14/2023  5:37 PM EDT -----  Please let this [facility] resident know that her labs are reviewed and stable without concerns we can discuss details at her next office appointment

## 2023-07-28 ENCOUNTER — OFFICE VISIT (OUTPATIENT)
Dept: INTERNAL MEDICINE | Facility: CLINIC | Age: 88
End: 2023-07-28
Payer: MEDICARE

## 2023-07-28 DIAGNOSIS — Z00.00 MEDICARE ANNUAL WELLNESS VISIT, SUBSEQUENT: Primary | ICD-10-CM

## 2023-07-28 DIAGNOSIS — Z78.0 POSTMENOPAUSAL STATUS: ICD-10-CM

## 2023-07-28 DIAGNOSIS — B35.3 TINEA PEDIS OF LEFT FOOT: ICD-10-CM

## 2023-07-28 DIAGNOSIS — Z12.31 ENCOUNTER FOR SCREENING MAMMOGRAM FOR MALIGNANT NEOPLASM OF BREAST: ICD-10-CM

## 2023-07-28 PROCEDURE — G0439 PPPS, SUBSEQ VISIT: HCPCS | Performed by: FAMILY MEDICINE

## 2023-07-28 PROCEDURE — 99214 OFFICE O/P EST MOD 30 MIN: CPT | Mod: 25 | Performed by: FAMILY MEDICINE

## 2023-07-28 RX ORDER — ECONAZOLE NITRATE 10 MG/G
CREAM TOPICAL 2 TIMES DAILY
Qty: 30 G | Refills: 1 | Status: SHIPPED | OUTPATIENT
Start: 2023-07-28 | End: 2023-08-27

## 2023-07-28 NOTE — PROGRESS NOTES
OFFICE VISIT NOTE     LOAN BRAN DO        PATIENT NAME:Shalini Kunz  PATIENT : 1935  ANNE MARIE: 2023    CC: No chief complaint on file.  maw        HPI   Shalini Kunz is a 87 y.o. female  With HTN, SVT and pyloric stricture seen as MAW . She is a [facility] resident living with her .     Overall she is doing fine.  She tells me that she sees podiatry and audiology out near [facility]..    She tells me that she has left wet macular degeneration and right dry macular degeneration and seeing her eye doctor regularly for this.  She has a weird sensation discomfort in her left fourth fifth into her web toe that is intermittently bothersome.      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  She tells me that she would like to continue mammograms-- Cervical cancer screening:completed  -- Bone Health screening: Tells me she would like to continue DEXA scans  -- Vision Screening:dr tse   -- Hearing Screening:          The following have been reviewed and updated as appropriate in this visit: active problem list, medication list, allergies, family history, social history,  health maintenance            Past Medical History:   Diagnosis Date   • Anemia    • Arthritis     thumbs   • Deep vein thrombosis (CMS/HCC)     2019   • Hypertension    • Lightheadedness    • Lipid disorder    • Liver disease        Past Surgical History:   Procedure Laterality Date   • ABDOMINAL SURGERY      2019   • ADENOIDECTOMY Bilateral    • CHOLECYSTECTOMY  10/03/2011   • EYE SURGERY      2020    • GANGLION CYST EXCISION     • GASTRIC BYPASS     • TONSILLECTOMY       Social History     Socioeconomic History   • Marital status:      Spouse name: Not on file   • Number of children: Not on file   • Years of education: Not on file   • Highest education level: Not on file   Occupational History   • Not on file   Tobacco Use   • Smoking status: Never   • Smokeless tobacco: Never   Substance and Sexual Activity   • Alcohol use: Yes     Alcohol/week: 1.0 standard drink of alcohol     Types: 1 Glasses of wine per week     Comment: half one at dinner    • Drug use: Not on file   • Sexual activity: Not on file   Other Topics Concern   • Not on file   Social History Narrative   • Not on file     Social Determinants of Health     Financial Resource Strain: Not on file   Food Insecurity: Not on file   Transportation Needs: Not on file   Physical Activity: Not on file   Stress: Not on file   Social Connections: Not on file   Intimate Partner Violence: Not on file   Housing Stability: Not on file         Family History   Problem Relation Age of Onset   • Alzheimer's disease Biological Mother    • Heart failure Biological Father    • No Known Problems Biological Sister          No Known Allergies      Current Outpatient Medications   Medication Sig Dispense Refill   • atorvastatin (LIPITOR) 10 mg tablet Take 0.5 tablets (5 mg total) by mouth daily. 45 tablet 1   • calcium carbonate-vitamin D3 (CALCIUM 600 WITH VITAMIN D3) 600 mg(1,500mg) -400 unit tablet,chewable Take 600 mg by mouth once.     • cholecalciferol,  vitamin D3, 1,000 unit (25 mcg) tablet Take 1 tablet (1,000 Units total) by mouth daily. 90 tablet 3   • cyanocobalamin 1,000 mcg tablet Take 1 tablet (1,000 mcg total) by mouth daily. 90 tablet 3   • ELIQUIS 2.5 mg tablet TAKE 1 TABLET BY MOUTH  TWICE DAILY 180 tablet 3   • fexofenadine 180 mg tablet Take 180 mg by mouth once.     • iron, carbonyl 25 mg iron tablet Take 25 mg by mouth daily.     • metoprolol succinate XL (TOPROL-XL) 25 mg 24 hr tablet Take 1 tablet (25 mg total) by mouth daily. 90 tablet 3   • multivitamin capsule Take 1 capsule by mouth daily.     • omeprazole 40 mg capsule Take 40 mg by mouth daily.     • SUMAtriptan (IMITREX) 50 mg tablet Take 1 tablet (50 mg total) by mouth once as needed for migraine. 30 tablet 3   • triamcinolone 55 mcg nasal inhaler Administer 2 sprays into affected nostril(s) daily as needed.         No current facility-administered medications for this visit.       Review of Systems  ROS  See hpi.   General: Denies fatigue, weight loss or weight gain.    Head: Denies headaches trauma   Eyes: Denies blurry vision, diplopia, decreased vision.  Denies drainage or excessive tearing.  Ears: Denies tinnitis, decreased hearing, ear drainage  Nose: Denies rhinorrhea, epistaxi  Mouth: Denies dry mouth  Neck: Denies lumps, bumps.  Denies dysphagia, odynophagia  Pulmonary:  Denies shortness of breath, difficulty breathing, excessive drooling, change in sputum.   Cardiac: Denies chest pain, flip-flop sensation   GI/Abdomen: Denies vomit, nausea, diarrhea, constipation, hematochezia.  Denies abdominal pain.    Uro/: Denies hematuria, urgency, frequency, burning sensation with urination. Denies discharge.   Skin: Denies lumps, bumps, rash. +ecchymosis   MSK:  Denies weakness, numbness, tingling.    Neuro: Denies confusion, vertigo.              VITALS  There were no vitals filed for this visit.  97.7 Fahrenheit, 147 pounds, 5 foot 5 inches, heart rate 63, 90% room air,  "120/58        Wt Readings from Last 3 Encounters:   03/07/23 67.6 kg (149 lb)   03/07/23 67.6 kg (149 lb)   01/03/23 65.8 kg (145 lb)       Ht Readings from Last 3 Encounters:   03/07/23 1.676 m (5' 6\")   03/07/23 1.676 m (5' 6\")   02/23/22 1.676 m (5' 6\")       BP Readings from Last 3 Encounters:   03/07/23 (!) 145/70   03/07/23 132/70   08/23/22 124/60       Physical Exam  PHYSICAL EXAM  General: Appears well, in no distress. Pt is pleasant. Hygiene normal.  Head: NC/AT.   Eyes: Conjunctiva and sclera normal bilaterally. No lid-lag.  Ears: Right tympanic membrane normal without any cerumen.  Left tympanic membrane with some debris and cerumen in the canal there is no redness or bulge  Neck: Inspection normal. Trachea midline. supple, FROM without pain. No cervical lymphadenopathy.  No enlargement of thyroid.  Cardiac: regular, rate, and rhythm. No murmurs, rubs, or gallops.  Lungs: negative for respiratory distress with normal respiratory effort. Lungs clear to auscultation bilaterally. No wheezes, rales, or rhonchi. No intercostal retractions  Abdomen: +BS. Bowel sounds normal. Abdomen is soft, non-distended. No tenderness. No masses or organomegaly. No guarding.   Skin: warm and dry. No erythema or rash.  Left fourth fifth toe web with whiteness and moisture there is no tenderness to palpation  Extremities: No peripheral edema or extremity lymphadenopathy  MSK: 5/5 UE and LE b/l. Gait stable and intact. Sitting Balance stable.   Psych: linear. Normal mood and affect.  No SI/HI. AAOX3.           PERTINENT LABS, IMAGING    No new labs.  Lab Results   Component Value Date    WBC 4.62 07/14/2023    HGB 14.8 07/14/2023    HCT 45.5 (H) 07/14/2023    MCV 94.2 07/14/2023     07/14/2023         Chemistry        Component Value Date/Time     07/14/2023 0910    K 4.6 07/14/2023 0910     07/14/2023 0910    CO2 26 07/14/2023 0910    BUN 15 07/14/2023 0910    CREATININE 0.8 07/14/2023 0910        Component " Value Date/Time    CALCIUM 9.3 07/14/2023 0910    ALKPHOS 95 07/14/2023 0910    AST 21 07/14/2023 0910    ALT 15 07/14/2023 0910    BILITOT 0.6 07/14/2023 0910            Lab Results   Component Value Date    CHOL 170 07/14/2023    CHOL 172 06/24/2022    CHOL 173 12/07/2021     Lab Results   Component Value Date    HDL 52 07/14/2023    HDL 58 06/24/2022    HDL 52 (L) 12/07/2021     Lab Results   Component Value Date    LDLCALC 71 07/14/2023    LDLCALC 82 06/24/2022    LDLCALC 86 12/07/2021     Lab Results   Component Value Date    TRIG 237 (H) 07/14/2023    TRIG 161 (H) 06/24/2022    TRIG 174 (H) 12/07/2021     No results found for: CHOLHDL    Lab Results   Component Value Date    TSH 3.52 07/14/2023       Lab Results   Component Value Date    HGBA1C 5.4 07/14/2023       No results found for: HAV, HEPAIGM, HEPBIGM, HEPBCAB, HBEAG, HEPCAB    No results found for: MICROALBUR, [id number]    No results found for this or any previous visit (from the past 24 hour(s)).    No results found.            Immunization History   Administered Date(s) Administered   • Pneumococcal Conjugate 13-Valent 09/11/2017   • Pneumococcal Polysaccharide 04/02/2021   • SARS-COV-2 (COVID19) VACCINE, PFIZER MONOVALENT 02/10/2021, 03/24/2021   • Tdap 10/13/2008   • Zoster 05/26/2009         Health Maintenance   Topic Date Due   • DEXA Scan  Never done   • Depression Screening  Never done   • Medicare Annual Wellness Visit  Never done   • Falls Risk Screening  Never done   • Zoster Vaccine (2 of 3) 07/21/2009   • DTaP, Tdap, and Td Vaccines (2 - Td or Tdap) 10/13/2018   • COVID-19 Vaccine (3 - Booster for Pfizer series) 05/19/2021   • Influenza Vaccine (1) 08/01/2023   • Pneumococcal (65 years and older)  Completed   • Meningococcal ACWY  Aged Out   • HIB Vaccines  Aged Out   • Hepatitis B Vaccines  Aged Out   • IPV Vaccines  Aged Out   • HPV Vaccines  Aged Out            Diagnosis Plan   1. Encounter for screening mammogram for malignant neoplasm  of breast  BI SCREENING MAMMOGRAM BILATERAL(TOMOSYNTHESIS)      2. Postmenopausal status  DEXA BONE DENSITY      3. Tinea pedis of left foot            Shalini Kunz is a 89 y.o. female  With HTN, SVT and pyloric stricture seen for MAW. She is a [facility] resident living with her .   Today we reviewed 6/24/22 labs excellent. Fall stable precautions reviewed. Med refill    #MAW 7/28/23   clock draw normal   word recall 3/3  ADLs and IADLs independent  Social alcohol use denies tobacco use  She is driving without issues  Specialist: She is seeing cardiology, audiology-nj, podiatry-[place]  Rates health is good.  Exercising regularly takes medications as prescribed denies falls    #left tinea pedis  Minimal erythema and prominent white, macerated skin between the 4th and 5th  Toes  Econazole bid   Podiatry if worsening    #left ear- tm debris- ent referral    #afib  #HTN  - dr duque -> dr [facility]  - eliquis 2.5 po bid, metoprolol 25mg po daily    #hx of PE    #HLD- lipitor 5mg po daily  Recent level stable   check level     #vitamin D def- dec level 25 takign 1000 IU po daily  Repeat level    #b12 def-  reduce to QOD   Repeat level    #left wet mac deg- injections  #right dry mac degen- eye    #migraine- prn sumatriptan 50mg working well    #GOO sp lap gastro-jejunostomy dr Sy July 2019 , PRN omepzole 40mg po daily    #pyloric stenosis- dilation in past    #sp CCY    To do   dexa, and mammo per GO discussion   labs to be ordered at next 6 months visit  (yearly labs)    No follow-ups on file.    Renée Pittman DO

## 2023-07-28 NOTE — PATIENT INSTRUCTIONS
Your Personalized Prevention Plan Services (PPPS)    Preventive Services Checklist (Assumes Average Risk Unless Otherwise Noted):    Health Maintenance Topics with due status: Overdue       Topic Date Due    DEXA Scan Never done    Depression Screening Never done    Falls Risk Screening Never done    Zoster Vaccine 07/21/2009    DTaP, Tdap, and Td Vaccines 10/13/2018    COVID-19 Vaccine 05/19/2021     Health Maintenance Topics with due status: Not Due       Topic Last Completion Date    Medicare Annual Wellness Visit 07/28/2023    Influenza Vaccine Not Due     Health Maintenance Topics with due status: Completed       Topic Last Completion Date    Pneumococcal (65 years and older) 04/02/2021     Health Maintenance Topics with due status: Aged Out       Topic Date Due    Meningococcal ACWY Aged Out    HIB Vaccines Aged Out    Hepatitis B Vaccines Aged Out    IPV Vaccines Aged Out    HPV Vaccines Aged Out       You May Be Eligible for These Additional Preventive Services   (Assumes Average Risk Unless Otherwise Noted)  Diabetes Screening Any 1 risk factor: hypertension, dyslipidemia, obesity, high glucose; or Any 2 risk factors: >=64yo, overweight, family history diabetes (covered every 6 months)   Hepatitis C Screening Any 1 risk factor: 1) blood transfusion before 1992,   2) current or past injection drug use (annually for high risk; if born between 4984-9979, see above for status).   Vaccine: Hepatitis B As necessary if at-risk: hemophilia, ESRD, diabetes, living with individual infected with hep B, healthcare worker with frequent contact with blood/bodily fluids (series covered once)   Sexually Transmitted Diseases (STDs) As necessary chlamydia, gonorrhea, syphilis, hepatitis B (covered annually)  HIV if any 1 risk factor present: 1) <16yo or >64yo and at increased risk or 2) 15-64yo and ask for it (covered annually)   Lung Cancer Screening Low dose chest CT if all three risk factors: 1)  50-76yo, 2) smoker or quit within last 15y, 3) >=20 pack years (covered annually).  No results found for this or any previous visit.       Cholesterol Screening Both risk factors: 1) >=21yo and 2)  increased risk coronary artery disease (covered every 5 years).     Breast Cancer Screening Covered once 35-38yo, annually >=39yo (if >=51yo, see above for status).         Health Risk Factors with Personalized Education:  ----------------------------------------------------------------------------------------------------------------------  Controlling Your Blood Pressure  Maintain a normal weight (body mass index between 18.5 and 24.9).  Eat more fruit, vegetables and low-fat dairy.  Eat less saturated fat and total fat.  Lower your sodium (salt) intake.  Try to stay under 1500 mg per day, but if you cannot get your intake to be that low, at least lower it by 1000 mg.  Stay active.  Try to get at least 90 to 150 minutes of exercise per week.  Try brisk walking, swimming, bicycling or dancing.  Limit alcohol intake.  When you do consume alcohol, drink no more than 1 drink per day.  If you have been prescribed medication, take it regularly and exactly as prescribed.  Let your PCP know if you have any problems or questions about your medication.  Check your blood pressure at home or at the store.  Write down your readings and share them with your PCP  ----------------------------------------------------------------------------------------------------------------------  Controlling Your Cholesterol  Reduce the amount of saturated and trans fat in your diet.  Limit intake of red meat.  Consume only low-fat or non-fat/skim dairy.  Limit fried food.  Cook with vegetable oils.  Reduce your intake of sugary foods, sugary drinks and alcohol.  Eat a diet high in fruit, vegetables and whole grains.  Get protein from fish, poultry and a small portion of nuts.  Stay active.  Try to get at least 90 to 150 minutes of exercise per week.   Try brisk walking, swimming, bicycling or dancing.  Maintain a healthy weight by balancing your diet and exercise.  If you have been prescribed medication, take it regularly and exactly as prescribed. Let your PCP know if you have any problems or questions about your medication.  It’s important to know your cholesterol numbers.  When recommended by your PCP, get the cholesterol blood test.  ----------------------------------------------------------------------------------------------------------------------  Maintaining Strong Bones  Try to get at least 90 to 150 minutes of weight-bearing exercise per week.  Ensure intake of at least 1200mg of calcium per day.  Eat foods high in calcium like milk and other dairy, green vegetables, fruit, canned fish with soft and edible bones, nuts, calcium-set tofu.  Some foods are calcium-fortified, like bread, cereal, fruit juices and mineral water.  Help your body make vitamin D by getting 10-15 minutes per day of sunlight.    Ensure intake of at least 600IU of vitamin D per day.  Eat foods high in vitamin D like oily fish (salmon, sardines, mackerel) and eggs.  Some foods are fortified with vitamin D, like dairy and cereals.  Avoid high amounts of caffeine and salt, since they can cause the body to loose calcium.  Limit alcohol intake, since it is associated with weaker bones and is associated with falls and fractures.  Limit intake of fizzy drinks.  ----------------------------------------------------------------------------------------------------------------------  Reducing Your Risk of Falls  Tell your PCP if any of your medications make you feel tired, dizzy, lightheaded or off-balance.  Maintain coordination, flexibility and balance by ensuring regular physical activity.  Limit alcohol intake to 1 drink per day.  Consider avoiding all alcohol intake.  Ensure good vision.  Visit an ophthalmologist or optometrist regularly for vision screening or to make sure your glasses /  contact lens prescription is correct.  If you need glasses or contacts, wear them.  When you get new glasses or contacts, take time to get used to them.  Do not wear sunglasses or tinted lenses when indoors.  Ensure good hearing.  Have your hearing checked if you are having trouble hearing, or family and friends think you cannot hear them.  If you need a hearing aid, be sure it fits well and wear it.  Get enough rest.  Ensure about 7-9 hours of sleep every day.  Get up slowly from your bed or chairs.  Do not start walking until you are sure you feel steady.  Wear non-skid, rubber-soled, low-heeled shoes.  Do not walk in socks, or in shoes and slippers with smooth soles.  If your PCP or therapist recommends using a cane or walker, use it regularly.  Make your home safer.  Increase lighting throughout the house, especially at the top and bottom of stairs.  Ensure lighting is easily turned on when getting up in the middle of the night.  Make sure there are two secure rails on all stairs.  Install grab bars in the bathtub / shower and near the toilet.  Consider using a shower chair and / or a hand-held shower.  Spread sand or salt on icy surfaces.  Beware of wet surfaces, which can be icy.  Tell your PCP if you have fallen.

## 2023-08-22 ENCOUNTER — HOSPITAL ENCOUNTER (OUTPATIENT)
Dept: RADIOLOGY | Age: 88
Discharge: HOME | End: 2023-08-22
Attending: FAMILY MEDICINE
Payer: MEDICARE

## 2023-08-22 DIAGNOSIS — Z12.31 ENCOUNTER FOR SCREENING MAMMOGRAM FOR MALIGNANT NEOPLASM OF BREAST: ICD-10-CM

## 2023-08-22 DIAGNOSIS — Z78.0 POSTMENOPAUSAL STATUS: ICD-10-CM

## 2023-08-22 PROCEDURE — 77067 SCR MAMMO BI INCL CAD: CPT

## 2023-08-22 PROCEDURE — 77080 DXA BONE DENSITY AXIAL: CPT

## 2023-08-22 PROCEDURE — 77063 BREAST TOMOSYNTHESIS BI: CPT

## 2023-08-23 NOTE — RESULT ENCOUNTER NOTE
I was unable to reach patient by phone.  Please let her know that her mammogram is normal that evidence of malignancy.  If she is having any symptoms to her breast she should schedule an appointment.  Please let her know that her DEXA bone scan shows osteoporosis -can discuss details at her next office appointment visit but it is recommended that she consider treatment to help strengthen her bone .she should continue to work on weightbearing exercises.  Could Offer her to see an rheumatologist for treatment options.  Dr Pittman

## 2023-08-24 ENCOUNTER — TELEPHONE (OUTPATIENT)
Dept: PRIMARY CARE | Facility: CLINIC | Age: 88
End: 2023-08-24
Payer: MEDICARE

## 2023-08-24 NOTE — TELEPHONE ENCOUNTER
----- Message from Renée Pittman DO sent at 8/23/2023 12:56 PM EDT -----  I was unable to reach patient by phone.  Please let her know that her mammogram is normal that evidence of malignancy.  If she is having any symptoms to her breast she should schedule an appointment.  Please let her know that her DEXA bone scan shows osteoporosis -can discuss details at her next office appointment visit but it is recommended that she consider treatment to help strengthen her bone .she should continue to work on weightbearing exercises.  Could Offer her to see an rheumatologist for treatment options.  Dr Pittman

## 2023-09-12 ENCOUNTER — TELEPHONE (OUTPATIENT)
Dept: SCHEDULING | Facility: CLINIC | Age: 88
End: 2023-09-12
Payer: MEDICARE

## 2023-09-12 ENCOUNTER — OFFICE VISIT (OUTPATIENT)
Dept: CARDIOLOGY | Facility: CLINIC | Age: 88
End: 2023-09-12
Payer: MEDICARE

## 2023-09-12 VITALS
HEIGHT: 66 IN | HEART RATE: 67 BPM | DIASTOLIC BLOOD PRESSURE: 78 MMHG | BODY MASS INDEX: 23.78 KG/M2 | WEIGHT: 148 LBS | OXYGEN SATURATION: 97 % | SYSTOLIC BLOOD PRESSURE: 120 MMHG

## 2023-09-12 DIAGNOSIS — R00.2 PALPITATIONS: Primary | ICD-10-CM

## 2023-09-12 PROCEDURE — 99215 OFFICE O/P EST HI 40 MIN: CPT | Performed by: INTERNAL MEDICINE

## 2023-09-12 PROCEDURE — 93000 ELECTROCARDIOGRAM COMPLETE: CPT | Performed by: INTERNAL MEDICINE

## 2023-09-12 NOTE — TELEPHONE ENCOUNTER
Patient called wants to schedule OV     Patient state she was in to see Dr Storm today and wanted to Schedule but was told office wasn't sure who she should schedule with please advise      Patient can be reached at:[phone]

## 2023-09-12 NOTE — PROGRESS NOTES
Electrophysiology Office  Consultation       Reason for visit:   Chief Complaint   Patient presents with    Transfer of care           HPI   Shalini Kunz is a 87 y.o. female who presents for evaluation and management of paroxysmal atrial fibrillation.  She states that she gets rare and very minimally symptomatic palpitations.  She denies other cardiac symptoms at this time.  She is feeling well with no new complaints and no significant changes in her medical history.  She continues to take half dose Eliquis for stroke prophylaxis.      Past Medical History:   Diagnosis Date    Anemia     Arthritis     thumbs    Deep vein thrombosis (CMS/HCC)     2019    Hypertension     Lightheadedness     Lipid disorder     Liver disease        Past Surgical History:   Procedure Laterality Date    ABDOMINAL SURGERY      2019    ADENOIDECTOMY Bilateral     CHOLECYSTECTOMY  10/03/2011    EYE SURGERY      2020     GANGLION CYST EXCISION      GASTRIC BYPASS      TONSILLECTOMY           Social history, family history and allergies were reviewed and updated in EMR.      Current Outpatient Medications   Medication Sig Dispense Refill    atorvastatin (LIPITOR) 10 mg tablet Take 0.5 tablets (5 mg total) by mouth daily. 45 tablet 1    calcium carbonate-vitamin D3 (CALCIUM 600 WITH VITAMIN D3) 600 mg(1,500mg) -400 unit tablet,chewable Take 600 mg by mouth once.      cholecalciferol, vitamin D3, 1,000 unit (25 mcg) tablet Take 1 tablet (1,000 Units total) by mouth daily. 90 tablet 3    cyanocobalamin 1,000 mcg tablet Take 1 tablet (1,000 mcg total) by mouth daily. 90 tablet 3    ELIQUIS 2.5 mg tablet TAKE 1 TABLET BY MOUTH  TWICE DAILY 180 tablet 3    metoprolol succinate XL (TOPROL-XL) 25 mg 24 hr tablet Take 1 tablet (25 mg total) by mouth daily. 90 tablet 3    multivitamin capsule Take 1 capsule by mouth daily.      SUMAtriptan (IMITREX) 50 mg tablet Take 1 tablet (50 mg total) by mouth once as needed for migraine.  30 tablet 3    fexofenadine 180 mg tablet Take 180 mg by mouth once.      iron, carbonyl 25 mg iron tablet Take 25 mg by mouth daily.      omeprazole 40 mg capsule Take 40 mg by mouth daily.      triamcinolone 55 mcg nasal inhaler Administer 2 sprays into affected nostril(s) daily as needed.         No current facility-administered medications for this visit.          ROS  As per the HPI.  Otherwise comprehensive 10 point review of systems was reviewed and is negative unless mentioned above.      Objective   Vitals:    09/12/23 1050   BP: 120/78   Pulse: 67   SpO2: 97%     Wt Readings from Last 3 Encounters:   09/12/23 67.1 kg (148 lb)   03/07/23 67.6 kg (149 lb)   03/07/23 67.6 kg (149 lb)     Body mass index is 23.89 kg/m².  Physical Exam  General: No acute distress.  HEENT: Anicteric.  Moist mucous membranes.  Neck: Supple, no JVD.  Lungs: Clear to auscultation bilaterally.  Cardiac: Regular.  TAO 2/6  Abdomen: Soft, nontender.  Extremities: Trace lower extremity edema.  Skin: Warm, dry.  Neurologic: Grossly intact.  Psychiatric: Behavior is appropriate and cooperative.       Lab Results   Component Value Date    WBC 4.62 07/14/2023    HGB 14.8 07/14/2023     07/14/2023    CHOL 170 07/14/2023    TRIG 237 (H) 07/14/2023    HDL 52 07/14/2023    ALT 15 07/14/2023    AST 21 07/14/2023     07/14/2023    K 4.6 07/14/2023    CREATININE 0.8 07/14/2023    TSH 3.52 07/14/2023    HGBA1C 5.4 07/14/2023     LDL Calculated   Date Value Ref Range Status   07/14/2023 71 <=100 mg/dL Final     Comment:     ATP III GUIDELINES  Optimal: <100 mg/dL  Near/Above Optimal: 100-129 mg/dL  Borderline High: 130-159 mg/dL  High: 160-189 mg/dL  Very High: >190 mg/dL            Electrocardiogram performed today was personally reviewed by me and showed none sinus atrial rhythm at a rate of 61 bpm with normal axis intervals and repolarization..     Cardiac Imaging    TRANSTHORACIC ECHO (TTE) COMPLETE 03/07/2023    Interpretation  Summary    Left Ventricle: Normal ventricle size. Normal wall thickness. Estimated EF 55%. No regional wall motion abnormalities. Grade I diastolic dysfunction.    Right Ventricle: Normal ventricle size. Low normal systolic function.    Left Atrium: Mildly dilated atrium.    Right Atrium: Normal sized atrium.    Aortic Valve: Tricuspid valve. Moderate regurgitation. No stenosis.  Aorta top normal 3.8 cm    Mitral Valve: Bileaflet thickening. Mild regurgitation. The jet is eccentric.    Tricuspid Valve: Normal structure. Mild regurgitation. Estimated RVSP = 27 mmHg.    Pulmonic Valve: Normal structure. Trace regurgitation.    IVC/SVC: Inferior vena cava is <2.1cm. Inferior vena cava collapses >50% during inspiration.    Pericardium: Normal structure.         Assessment and Plan:    Ms. Cuadra is a 87-year-old female with a history of hypertension hyperlipidemia, paroxysmal atrial fibrillation, and valvular disease who is feeling well with no cardiac complaints today.  She does have an elevated [id number] score so should be anticoagulated fully for her elevated risk for thromboembolic events related to atrial fibrillation.  She is currently on half dose Eliquis because she states that she had gastric bleeding at one time and some gastric emptying issues requiring surgery.  I explained to her that I would like her on full dose anticoagulation but will do some research into her gastric condition to understand why she was placed on half dose.  She denies any recent blood in her stool or black in stools.  She does not believe she has had GI bleeding for many years.    Recommended that she follow-up with me in 6 months and also establish care with a general cardiologist.    Thank you for allowing me to participate in the care of your patient, please feel free to contact me with any questions or concerns.     Eleni Storm MD  9/12/2023

## 2023-09-28 ENCOUNTER — TELEPHONE (OUTPATIENT)
Dept: PRIMARY CARE | Facility: CLINIC | Age: 88
End: 2023-09-28
Payer: MEDICARE

## 2023-09-28 NOTE — TELEPHONE ENCOUNTER
----- Message from Renée Pittman DO sent at 9/28/2023  8:03 AM EDT -----  [facility] . Please let her know that her mammogram is negative for malignancy.thanks

## 2023-10-08 ENCOUNTER — APPOINTMENT (EMERGENCY)
Dept: RADIOLOGY | Facility: HOSPITAL | Age: 88
End: 2023-10-08
Payer: MEDICARE

## 2023-10-08 ENCOUNTER — HOSPITAL ENCOUNTER (EMERGENCY)
Facility: HOSPITAL | Age: 88
Discharge: HOME | End: 2023-10-08
Attending: EMERGENCY MEDICINE
Payer: MEDICARE

## 2023-10-08 VITALS
HEART RATE: 60 BPM | HEIGHT: 66 IN | OXYGEN SATURATION: 98 % | BODY MASS INDEX: 23.3 KG/M2 | RESPIRATION RATE: 20 BRPM | TEMPERATURE: 98.5 F | DIASTOLIC BLOOD PRESSURE: 63 MMHG | WEIGHT: 145 LBS | SYSTOLIC BLOOD PRESSURE: 135 MMHG

## 2023-10-08 DIAGNOSIS — R07.9 CHEST PAIN, UNSPECIFIED TYPE: Primary | ICD-10-CM

## 2023-10-08 LAB
ALBUMIN SERPL-MCNC: 4.1 G/DL (ref 3.5–5.7)
ALP SERPL-CCNC: 81 IU/L (ref 34–125)
ALT SERPL-CCNC: 16 IU/L (ref 7–52)
ANION GAP SERPL CALC-SCNC: 5 MEQ/L (ref 3–15)
AST SERPL-CCNC: 29 IU/L (ref 13–39)
BASOPHILS # BLD: 0.02 K/UL (ref 0.01–0.1)
BASOPHILS NFR BLD: 0.4 %
BILIRUB SERPL-MCNC: 0.7 MG/DL (ref 0.3–1.2)
BUN SERPL-MCNC: 21 MG/DL (ref 7–25)
CALCIUM SERPL-MCNC: 9.5 MG/DL (ref 8.6–10.3)
CHLORIDE SERPL-SCNC: 108 MEQ/L (ref 98–107)
CO2 SERPL-SCNC: 25 MEQ/L (ref 21–31)
CREAT SERPL-MCNC: 0.8 MG/DL (ref 0.6–1.2)
DIFFERENTIAL METHOD BLD: ABNORMAL
EOSINOPHIL # BLD: 0.02 K/UL (ref 0.04–0.36)
EOSINOPHIL NFR BLD: 0.4 %
ERYTHROCYTE [DISTWIDTH] IN BLOOD BY AUTOMATED COUNT: 12 % (ref 11.7–14.4)
GFR SERPL CREATININE-BSD FRML MDRD: >60 ML/MIN/1.73M*2
GLUCOSE SERPL-MCNC: 87 MG/DL (ref 70–99)
HCT VFR BLDCO AUTO: 42 % (ref 35–45)
HGB BLD-MCNC: 13.8 G/DL (ref 11.8–15.7)
IMM GRANULOCYTES # BLD AUTO: 0.01 K/UL (ref 0–0.08)
IMM GRANULOCYTES NFR BLD AUTO: 0.2 %
LYMPHOCYTES # BLD: 1.1 K/UL (ref 1.2–3.5)
LYMPHOCYTES NFR BLD: 21.7 %
MCH RBC QN AUTO: 30.7 PG (ref 28–33.2)
MCHC RBC AUTO-ENTMCNC: 32.9 G/DL (ref 32.2–35.5)
MCV RBC AUTO: 93.3 FL (ref 83–98)
MONOCYTES # BLD: 0.55 K/UL (ref 0.28–0.8)
MONOCYTES NFR BLD: 10.9 %
NEUTROPHILS # BLD: 3.36 K/UL (ref 1.7–7)
NEUTS SEG NFR BLD: 66.4 %
NRBC BLD-RTO: 0 %
PDW BLD AUTO: 10.5 FL (ref 9.4–12.3)
PLATELET # BLD AUTO: 243 K/UL (ref 150–369)
POTASSIUM SERPL-SCNC: 5 MEQ/L (ref 3.5–5.1)
PROT SERPL-MCNC: 6.5 G/DL (ref 6–8.2)
RBC # BLD AUTO: 4.5 M/UL (ref 3.93–5.22)
SODIUM SERPL-SCNC: 138 MEQ/L (ref 136–145)
TROPONIN I SERPL HS-MCNC: 2.9 PG/ML
TROPONIN I SERPL HS-MCNC: 3.1 PG/ML
WBC # BLD AUTO: 5.06 K/UL (ref 3.8–10.5)

## 2023-10-08 PROCEDURE — 84484 ASSAY OF TROPONIN QUANT: CPT | Performed by: EMERGENCY MEDICINE

## 2023-10-08 PROCEDURE — 93005 ELECTROCARDIOGRAM TRACING: CPT

## 2023-10-08 PROCEDURE — 84484 ASSAY OF TROPONIN QUANT: CPT | Mod: 91 | Performed by: EMERGENCY MEDICINE

## 2023-10-08 PROCEDURE — 36415 COLL VENOUS BLD VENIPUNCTURE: CPT | Performed by: EMERGENCY MEDICINE

## 2023-10-08 PROCEDURE — 71045 X-RAY EXAM CHEST 1 VIEW: CPT

## 2023-10-08 PROCEDURE — 85025 COMPLETE CBC W/AUTO DIFF WBC: CPT | Performed by: EMERGENCY MEDICINE

## 2023-10-08 PROCEDURE — 99283 EMERGENCY DEPT VISIT LOW MDM: CPT | Mod: 25

## 2023-10-08 PROCEDURE — 93005 ELECTROCARDIOGRAM TRACING: CPT | Performed by: EMERGENCY MEDICINE

## 2023-10-08 PROCEDURE — 80053 COMPREHEN METABOLIC PANEL: CPT | Performed by: EMERGENCY MEDICINE

## 2023-10-08 ASSESSMENT — ENCOUNTER SYMPTOMS
VOMITING: 0
COUGH: 0
FEVER: 0
SHORTNESS OF BREATH: 0
ABDOMINAL PAIN: 0
NAUSEA: 1

## 2023-10-08 NOTE — ED ATTESTATION NOTE
I have personally seen and examined the patient.  I reviewed and agree with physician assistant / nurse practitioners assessment and plan of care.     Exam: Patient is resting comfortably in no acute distress.  Her vital signs are stable and she is afebrile.  Heart is regular without murmur.  Lungs are clear to auscultation bilaterally.  There is no lower extremity edema or calf tenderness present.  Patient is awake alert and oriented with an intact neuro exam.    Plan: Patient had a brief episode of chest heaviness which has since resolved.  Patient also related some gastrointestinal symptoms associated with it.  Will work-up for ACS.  Will check labs including serial troponins, EKG and a chest x-ray.           Lemuel Contreras DO  10/08/23 1400

## 2023-10-08 NOTE — DISCHARGE INSTRUCTIONS
Please follow-up with a primary care provider (PCP) in 2-3 days.  Please follow-up with cardiology tomorrow.  They should give you a call however if you do not hear from them tomorrow please give them a call by 12 PM.  Please bring all your discharge paperwork with you to your follow up appointment. Your primary care physician will go over all of your results again including any incidental findings.  Your primary care provider can also help you implement the recommendations we gave you today, coordinate care among your specialist, as well as make sure you are up-to-date with wellness exams, immunizations, and preventive screenings.   Your PCP can also help when you are feeling sick, potentially avoiding the need for urgent care or emergency department visits.  For these reasons, it is important that you follow-up with your PCP at least annually or more often based upon your medical conditions.  If you do not have a PCP, please call [phone] ([phone] for help finding one.  Please return to the emergency department for new or worsening symptoms including but not limited to chest pain, shortness of breath, palpitations.

## 2023-10-08 NOTE — PROGRESS NOTES
Returned call from answering service. RN from [facility] calling. States that patient developed sudden onset substernal chest heaviness at rest associated with severe nausea. this improved on its own after about 30m. VS taken after episode consistent w normal BP, pulse, and o2 sat. I am told they cannot obtain a 12 lead ecg so I advised that she be evaluated. They will arrange transport to [facility] ED.    Lemuel Martinez MD

## 2023-10-08 NOTE — ED PROVIDER NOTES
"Emergency Medicine Note  HPI   HISTORY OF PRESENT ILLNESS     Shalini Kunz is a 87 y.o. female with past medical history of A-fib, hyperlipidemia, hypertension, DVT, anemia, arthritis presenting to the emergency department for evaluation of chest pressure.  Patient reports this morning waking up at 8:30 AM with chest pressure and a \"quivering\" in her chest that lasted for about 30 minutes to 1 hour.  She also reports associated nausea at that time.  She reports now feeling well without any pain or symptoms.  Patient came from [place] via EMS.  Patient denies abdominal pain, vomiting, shortness of breath, cough            Patient History   PAST HISTORY     Reviewed from Nursing Triage:       Past Medical History:   Diagnosis Date    Anemia     Arthritis     thumbs    Deep vein thrombosis (CMS/HCC)     2019    Hypertension     Lightheadedness     Lipid disorder     Liver disease        Past Surgical History:   Procedure Laterality Date    ABDOMINAL SURGERY      2019    ADENOIDECTOMY Bilateral     CHOLECYSTECTOMY  10/03/2011    EYE SURGERY      2020     GANGLION CYST EXCISION      GASTRIC BYPASS      TONSILLECTOMY         Family History   Problem Relation Age of Onset    Alzheimer's disease Biological Mother     Heart failure Biological Father     No Known Problems Biological Sister        Social History     Tobacco Use    Smoking status: Never    Smokeless tobacco: Never   Substance Use Topics    Alcohol use: Yes     Alcohol/week: 1.0 standard drink of alcohol     Types: 1 Glasses of wine per week     Comment: half one at dinner          Review of Systems   REVIEW OF SYSTEMS     Review of Systems   Constitutional: Negative for fever.   Respiratory: Negative for cough and shortness of breath.    Cardiovascular: Positive for chest pain (Pressure). Negative for leg swelling.   Gastrointestinal: Positive for nausea. Negative for abdominal pain and vomiting.         VITALS     ED Vitals    Date/Time Temp " Pulse Resp BP SpO2 [facility]   10/08/23 1420 -- 60 20 135/63 98 % [facility]   10/08/23 1315 -- 62 22 119/53 97 % [facility]   10/08/23 1215 -- 64 20 150/64 98 % [facility]   10/08/23 1204 36.9 °C (98.5 °F) 64 18 165/61 97 % [facility]        Pulse Ox %: 98 % (10/08/23 1340)  Pulse Ox Interpretation: Normal (10/08/23 1340)  Heart Rate: 64 (10/08/23 1340)  Rhythm Strip Interpretation: Normal Sinus Rhythm (10/08/23 1340)     Physical Exam   PHYSICAL EXAM     Physical Exam  Constitutional:       General: She is not in acute distress.  HENT:      Head: Normocephalic and atraumatic.   Cardiovascular:      Rate and Rhythm: Normal rate and regular rhythm.      Pulses: Normal pulses.      Heart sounds: Normal heart sounds.   Pulmonary:      Effort: Pulmonary effort is normal.      Breath sounds: Normal breath sounds.   Abdominal:      General: Abdomen is flat. There is no distension.      Palpations: Abdomen is soft.      Tenderness: There is no abdominal tenderness. There is no guarding.   Musculoskeletal:         General: Normal range of motion.      Right lower leg: No tenderness. No edema.      Left lower leg: No tenderness. No edema.   Skin:     General: Skin is warm and dry.      Capillary Refill: Capillary refill takes less than 2 seconds.   Neurological:      General: No focal deficit present.      Mental Status: She is alert and oriented to person, place, and time.   Psychiatric:         Mood and Affect: Mood normal.         Behavior: Behavior normal.           PROCEDURES     Procedures     DATA     Results     Procedure Component Value Units Date/Time    HS Troponin (with 2 hour reflex) [[id number]]  (Normal) Collected: 10/08/23 1210    Specimen: Blood, Venous Updated: 10/08/23 1248     High Sens Troponin I 2.9 pg/mL     Comprehensive metabolic panel [[id number]]  (Abnormal) Collected: 10/08/23 1210    Specimen: Blood, Venous Updated: 10/08/23 1247     Sodium 138 mEQ/L      Potassium 5.0 mEQ/L      Comment: Results obtained on plasma. Plasma  Potassium values may be up to 0.4 mEQ/L less than serum values. The differences may be greater for patients with high platelet or white cell counts.        Chloride 108 mEQ/L      CO2 25 mEQ/L      BUN 21 mg/dL      Creatinine 0.8 mg/dL      Glucose 87 mg/dL      Calcium 9.5 mg/dL      AST (SGOT) 29 IU/L      ALT (SGPT) 16 IU/L      Alkaline Phosphatase 81 IU/L      Total Protein 6.5 g/dL      Comment: Test performed on plasma which typically contains approximately 0.4 g/dL more protein than serum.        Albumin 4.1 g/dL      Bilirubin, Total 0.7 mg/dL      eGFR >60.0 mL/min/1.73m*2      Anion Gap 5 mEQ/L     CBC and differential [[id number]]  (Abnormal) Collected: 10/08/23 1210    Specimen: Blood, Venous Updated: 10/08/23 1220     WBC 5.06 K/uL      RBC 4.50 M/uL      Hemoglobin 13.8 g/dL      Hematocrit 42.0 %      MCV 93.3 fL      MCH 30.7 pg      MCHC 32.9 g/dL      RDW 12.0 %      Platelets 243 K/uL      MPV 10.5 fL      Differential Type Auto     nRBC 0.0 %      Immature Granulocytes 0.2 %      Neutrophils 66.4 %      Lymphocytes 21.7 %      Monocytes 10.9 %      Eosinophils 0.4 %      Basophils 0.4 %      Immature Granulocytes, Absolute 0.01 K/uL      Neutrophils, Absolute 3.36 K/uL      Lymphocytes, Absolute 1.10 K/uL      Monocytes, Absolute 0.55 K/uL      Eosinophils, Absolute 0.02 K/uL      Basophils, Absolute 0.02 K/uL     [facility] Draw Panel [[id number]] Collected: 10/08/23 1210    Specimen: Blood, Venous Updated: 10/08/23 1217    Narrative:      The following orders were created for panel order [facility] Draw Panel.  Procedure                               Abnormality         Status                     ---------                               -----------         ------                     SendRR LT BLUE[[id number]]                                  In process                   Please view results for these tests on the individual orders.    SendRR LT BLUE [[id number]] Collected: 10/08/23 1210    Specimen:  Blood, Venous Updated: 10/08/23 1217          Imaging Results          X-RAY CHEST 1 VIEW (Final result)  Result time 10/08/23 13:47:00    Final result                 Impression:    IMPRESSION:  No active disease in the chest.             Narrative:      CLINICAL HISTORY: cp    COMMENT: A single AP radiograph of the chest is obtained .  No prior comparison  The cardiomediastinal silhouette is unremarkable. The lungs are grossly clear.  There is no appreciable effusion.No pneumothorax present. .                                ECG 12 lead   Independent Interpretation by ED Provider   Normal sinus rhythm at 65 bpm, MS interval 182, QRS 84, QT/QTc 388/403.  No STEMI.  Normal axis.          Scoring tools                                  ED Course & MDM   MDM / ED COURSE / CLINICAL IMPRESSION / DISPO     Medical Decision Making  Differential diagnosis considered but not limited to GERD, ACS, arrhythmia, nonspecific chest pain.    Amount and/or Complexity of Data Reviewed  Labs: ordered.  Radiology: ordered.  ECG/medicine tests: ordered and independent interpretation performed.          ED Course as of 10/09/23 0138   Sun Oct 08, 2023   1418 X-RAY CHEST 1 VIEW  IMPRESSION:  No active disease in the chest. [VL]   1528 Next a cardiology form faxed.  Patient informed to follow-up with primary care provider and cardiology tomorrow.  She was informed to give them a call if she does not hear from them by 12 PM.  She was given return precautions and agreeable to plan. [VL]      ED Course User Index  [VL] Nohemy Cartwright PA C     Clinical Impression      Chest pain, unspecified type     _________________     ED Disposition   Discharge                   Nohemy Cartwright PA C  10/09/23 0138

## 2023-10-09 ENCOUNTER — TELEPHONE (OUTPATIENT)
Dept: CARDIOLOGY | Facility: CLINIC | Age: 88
End: 2023-10-09
Payer: MEDICARE

## 2023-10-09 ENCOUNTER — TELEPHONE (OUTPATIENT)
Dept: PRIMARY CARE | Facility: CLINIC | Age: 88
End: 2023-10-09
Payer: MEDICARE

## 2023-10-09 LAB
ATRIAL RATE: 65
P AXIS: 31
PR INTERVAL: 182
QRS DURATION: 84
QT INTERVAL: 388
QTC CALCULATION(BAZETT): 403
R AXIS: 42
T WAVE AXIS: 48
VENTRICULAR RATE: 65

## 2023-10-09 PROCEDURE — 93010 ELECTROCARDIOGRAM REPORT: CPT | Performed by: INTERNAL MEDICINE

## 2023-10-09 NOTE — TELEPHONE ENCOUNTER
ED D/C follow Up call    Patient presented to [facility] on 10/8/23 s/p chest pain and is a resident of [facility].  ED follow up will be done there.  No call required.

## 2023-10-09 NOTE — TELEPHONE ENCOUNTER
Pt was seen in [facility]'s ER, per a fax from the ER. Called pt to schedule an appt., and pt stated that they will follow up with their own Cardiologist.

## 2023-10-10 ENCOUNTER — OFFICE VISIT (OUTPATIENT)
Dept: INTERNAL MEDICINE | Facility: CLINIC | Age: 88
End: 2023-10-10
Payer: MEDICARE

## 2023-10-10 DIAGNOSIS — E55.9 VITAMIN D DEFICIENCY: ICD-10-CM

## 2023-10-10 DIAGNOSIS — Z79.01 CHRONIC ANTICOAGULATION: ICD-10-CM

## 2023-10-10 DIAGNOSIS — E53.8 B12 DEFICIENCY: ICD-10-CM

## 2023-10-10 DIAGNOSIS — I48.0 PAROXYSMAL ATRIAL FIBRILLATION (CMS/HCC): Primary | ICD-10-CM

## 2023-10-10 DIAGNOSIS — E78.2 MIXED HYPERLIPIDEMIA: ICD-10-CM

## 2023-10-10 DIAGNOSIS — M81.0 OSTEOPOROSIS, UNSPECIFIED OSTEOPOROSIS TYPE, UNSPECIFIED PATHOLOGICAL FRACTURE PRESENCE: ICD-10-CM

## 2023-10-10 PROCEDURE — 99214 OFFICE O/P EST MOD 30 MIN: CPT | Performed by: FAMILY MEDICINE

## 2023-10-10 RX ORDER — SUMATRIPTAN SUCCINATE 50 MG/1
50 TABLET ORAL ONCE AS NEEDED
Qty: 30 TABLET | Refills: 3 | Status: SHIPPED | OUTPATIENT
Start: 2023-10-10 | End: 2024-11-12

## 2023-10-10 NOTE — PROGRESS NOTES
OFFICE VISIT NOTE     LOAN BRAN DO        PATIENT NAME:Shalini Kunz  PATIENT : 1935  ANNE MARIE: 10/10/2023    CC: No chief complaint on file.  er followup        HPI   Shalini Kunz is a 87 y.o. female  With HTN, SVT and pyloric stricture seen for ER followup . She is a [facility] resident living with her .     Overall she is doing fine.    She woke up 10/8/23 to her alarm. Then she felt chest pressure that last a few minutes without pain and some nausea prompting her to call the [place] nurse. She was evaluated in the ER for ACS rule out and fortunately negative.   She has not had recurrent episodes.       #afib  #HTN  - dr duque -> dr. 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  She tells me that she would like to continue mammograms  23  -- Cervical cancer screening:completed  -- Bone Health screening: Tells me she would like to continue DEXA scans 23 osteoporosis   -- Vision Screening:dr tse   -- Hearing Screening:  -- RSV vaccination: UTD      The following have been reviewed and updated as appropriate in this visit: active problem list, medication list, allergies, family history, social history,  health maintenance            Past Medical History:   Diagnosis Date    Anemia     Arthritis     thumbs    Deep vein thrombosis (CMS/HCC)     2019    Hypertension     Lightheadedness     Lipid disorder     Liver disease        Past Surgical History:   Procedure Laterality Date    ABDOMINAL SURGERY      2019    ADENOIDECTOMY Bilateral     CHOLECYSTECTOMY  10/03/2011    EYE SURGERY      2020     GANGLION CYST EXCISION      GASTRIC BYPASS      TONSILLECTOMY       Social History     Socioeconomic History    Marital status:      Spouse name: Not on file    Number of children: Not on file    Years of education: Not on file    Highest education level: Not on file   Occupational History    Not on file   Tobacco Use    Smoking status: Never    Smokeless tobacco: Never   Substance and Sexual Activity    Alcohol use: Yes     Alcohol/week: 1.0 standard drink of alcohol     Types: 1 Glasses of wine per week     Comment: half one at dinner     Drug use: Not on file    Sexual activity: Not on file   Other Topics Concern    Not on file   Social History Narrative    Not on file     Social Determinants of Health     Financial Resource Strain: Not on file   Food Insecurity: No Food Insecurity (10/8/2023)    Hunger Vital Sign     Worried About Running Out of Food in the Last Year: Never true     Ran Out of Food in the Last Year: Never true   Transportation Needs: Not on file   Physical Activity: Not on file   Stress: Not on file   Social Connections: Not on file   Intimate Partner Violence: Not on file   Housing Stability: Not on file         Family History   Problem Relation Age of Onset    Alzheimer's disease Biological Mother     Heart failure Biological Father     No Known Problems Biological Sister          No Known Allergies      Current Outpatient Medications   Medication Sig Dispense Refill    atorvastatin (LIPITOR) 10 mg tablet Take 0.5 tablets (5 mg total) by mouth daily. 45 tablet 1     calcium carbonate-vitamin D3 (CALCIUM 600 WITH VITAMIN D3) 600 mg(1,500mg) -400 unit tablet,chewable Take 600 mg by mouth once.      cholecalciferol, vitamin D3, 1,000 unit (25 mcg) tablet Take 1 tablet (1,000 Units total) by mouth daily. 90 tablet 3    cyanocobalamin 1,000 mcg tablet Take 1 tablet (1,000 mcg total) by mouth daily. 90 tablet 3    ELIQUIS 2.5 mg tablet TAKE 1 TABLET BY MOUTH  TWICE DAILY 180 tablet 3    fexofenadine 180 mg tablet Take 180 mg by mouth once.      iron, carbonyl 25 mg iron tablet Take 25 mg by mouth daily.      metoprolol succinate XL (TOPROL-XL) 25 mg 24 hr tablet Take 1 tablet (25 mg total) by mouth daily. 90 tablet 3    multivitamin capsule Take 1 capsule by mouth daily.      omeprazole 40 mg capsule Take 40 mg by mouth daily.      SUMAtriptan (IMITREX) 50 mg tablet Take 1 tablet (50 mg total) by mouth once as needed for migraine. 30 tablet 3    triamcinolone 55 mcg nasal inhaler Administer 2 sprays into affected nostril(s) daily as needed.         No current facility-administered medications for this visit.       Review of Systems  ROS  See hpi.   General: Denies fatigue, weight loss or weight gain.    Head: Denies headaches trauma   Eyes: Denies blurry vision, diplopia, decreased vision.  Denies drainage or excessive tearing.  Ears: Denies tinnitis, decreased hearing, ear drainage  Nose: Denies rhinorrhea, epistaxi  Mouth: Denies dry mouth  Neck: Denies lumps, bumps.  Denies dysphagia, odynophagia  Pulmonary:  Denies shortness of breath, difficulty breathing, excessive drooling, change in sputum.   Cardiac: Denies chest pain, flip-flop sensation   GI/Abdomen: Denies vomit, nausea, diarrhea, constipation, hematochezia.  Denies abdominal pain.    Uro/: Denies hematuria, urgency, frequency, burning sensation with urination. Denies discharge.   Skin: Denies lumps, bumps, rash.   MSK:  Denies weakness, numbness, tingling.    Neuro: Denies confusion, vertigo.   "            VITALS  There were no vitals filed for this visit.  See chart 134/68 HR 72 98%RA      Wt Readings from Last 3 Encounters:   10/08/23 65.8 kg (145 lb)   09/12/23 67.1 kg (148 lb)   03/07/23 67.6 kg (149 lb)       Ht Readings from Last 3 Encounters:   10/08/23 1.676 m (5' 6\")   09/12/23 1.676 m (5' 6\")   03/07/23 1.676 m (5' 6\")       BP Readings from Last 3 Encounters:   10/08/23 135/63   09/12/23 120/78   03/07/23 (!) 145/70       Physical Exam  PHYSICAL EXAM  General: Appears well, in no distress. Pt is pleasant. Hygiene normal.  Head: NC/AT.   Eyes: Conjunctiva and sclera normal bilaterally.   Neck: Inspection normal. Trachea midline. supple, FROM without pain. No cervical lymphadenopathy.  No enlargement of thyroid.  Cardiac: regular, rate, and rhythm. No murmurs, rubs, or gallops.  Lungs: negative for respiratory distress with normal respiratory effort. Lungs clear to auscultation bilaterally. No wheezes, rales, or rhonchi. No intercostal retractions  Abdomen: +BS. Bowel sounds normal. Abdomen is soft, non-distended. No tenderness. No masses or organomegaly. No guarding.   Skin: warm and dry. No erythema or rash.  Extremities: No peripheral edema or extremity lymphadenopathy  MSK: 5/5 UE and LE b/l. Gait stable and intact. Sitting Balance stable.   Psych: linear. Normal mood and affect.  No SI/HI. AAOX3.           PERTINENT LABS, IMAGING    No new labs.  Lab Results   Component Value Date    WBC 5.06 10/08/2023    HGB 13.8 10/08/2023    HCT 42.0 10/08/2023    MCV 93.3 10/08/2023     10/08/2023         Chemistry        Component Value Date/Time     10/08/2023 1210    K 5.0 10/08/2023 1210     (H) 10/08/2023 1210    CO2 25 10/08/2023 1210    BUN 21 10/08/2023 1210    CREATININE 0.8 10/08/2023 1210        Component Value Date/Time    CALCIUM 9.5 10/08/2023 1210    ALKPHOS 81 10/08/2023 1210    AST 29 10/08/2023 1210    ALT 16 10/08/2023 1210    BILITOT 0.7 10/08/2023 1210    " "        Lab Results   Component Value Date    CHOL 170 07/14/2023    CHOL 172 06/24/2022    CHOL 173 12/07/2021     Lab Results   Component Value Date    HDL 52 07/14/2023    HDL 58 06/24/2022    HDL 52 (L) 12/07/2021     Lab Results   Component Value Date    LDLCALC 71 07/14/2023    LDLCALC 82 06/24/2022    LDLCALC 86 12/07/2021     Lab Results   Component Value Date    TRIG 237 (H) 07/14/2023    TRIG 161 (H) 06/24/2022    TRIG 174 (H) 12/07/2021     No results found for: \"CHOLHDL\"    Lab Results   Component Value Date    TSH 3.52 07/14/2023       Lab Results   Component Value Date    HGBA1C 5.4 07/14/2023       No results found for: \"HAV\", \"HEPAIGM\", \"HEPBIGM\", \"HEPBCAB\", \"HBEAG\", \"HEPCAB\"    No results found for: \"MICROALBUR\", \"QLVH90BBH\"    No results found for this or any previous visit (from the past 24 hour(s)).    X-RAY CHEST 1 VIEW    Result Date: 10/8/2023  CLINICAL HISTORY: cp COMMENT: A single AP radiograph of the chest is obtained .  No prior comparison The cardiomediastinal silhouette is unremarkable. The lungs are grossly clear. There is no appreciable effusion.No pneumothorax present. .     IMPRESSION: No active disease in the chest.              Immunization History   Administered Date(s) Administered    Pneumococcal Conjugate 13-Valent 09/11/2017    Pneumococcal Polysaccharide 04/02/2021    SARS-COV-2 (COVID19) VACCINE, PFIZER MONOVALENT 02/10/2021, 03/24/2021    Tdap 10/13/2008    Zoster 05/26/2009         Health Maintenance   Topic Date Due    Depression Screening  Never done    Falls Risk Screening  Never done    Zoster Vaccine (2 of 3) 07/21/2009    DTaP, Tdap, and Td Vaccines (2 - Td or Tdap) 10/13/2018    Influenza Vaccine (1) Never done    COVID-19 Vaccine (3 - 2023-24 season) 09/01/2023    Medicare Annual Wellness Visit  07/28/2024    DEXA Scan  08/22/2025    Pneumococcal (65 years and older)  Completed    Meningococcal ACWY  Aged Out    HIB Vaccines  Aged Out    Hepatitis " B Vaccines  Aged Out    IPV Vaccines  Aged Out    HPV Vaccines  Aged Out           No diagnosis found.    Shalini Kunz is a 87 y.o. female  With HTN, SVT and pyloric stricture seen for ER followup. She is a [facility] resident living with her .       Specialist   Cards dr san  Rheum dr chino  Derm dr white    #left tinea pedis  Minimal erythema and prominent white, macerated skin between the 4th and 5th  Toes  Econazole bid   Podiatry if worsening    #chest pressure   ACS work up negative ER   followup cardiology  Education and counseling provided    #left ear- tm debris- ent referral    #afib  #HTN  - dr duque -> dr san  caridiology  - eliquis 2.5 po bid, metoprolol 25mg po daily    #hx of PE    #HLD- lipitor 5mg po daily  Recent level stable   check level       #osteoporosis  #vitamin D def- dec level 25 takign 1000 IU po daily  Repeat level  Rheum dr penn to consider prolia    #b12 def-  reduce to QOD   Repeat level    #left wet mac deg- injections  #right dry mac degen- eye    #BCC   derm dr white  Right arm    #migraine- prn sumatriptan 50mg working well    #GOO sp lap gastro-jejunostomy dr Sy July 2019 , PRN omepzole 40mg po daily    #pyloric stenosis- dilation in past    #sp CCY      #MAW 7/28/23   clock draw normal   word recall 3/3  ADLs and IADLs independent  Social alcohol use denies tobacco use  She is driving without issues  Specialist: She is seeing cardiology, audiology-nj, podiatry-nj  Rates health is good.  Exercising regularly takes medications as prescribed denies falls    To do  Labs for jan 2024 followup  followup rheum osteoporosis and tinea     No follow-ups on file.    Renée Pittman DO

## 2023-10-16 ENCOUNTER — TELEPHONE (OUTPATIENT)
Dept: SCHEDULING | Facility: CLINIC | Age: 88
End: 2023-10-16
Payer: MEDICARE

## 2023-10-16 NOTE — TELEPHONE ENCOUNTER
Hospital Follow Up     Name of patient: Shalini Kunz    Caller Name: Shalini    Relationship to patient: self    New or Established: new    Reason for visit: heaviness in chest    Previous Cardiologist: Dr Jo/Dr Storm    PCP: Dr Renée Pittman    Insurance name: medicare/aetna    Doctor requested: Aundrea    Name of hospital pt was admitted: [facility]    Timeframe instructed to follow-up within: no   .      Best contact number:[phone].  pcp wants her to be seen sooner than 3/2024.

## 2023-11-22 ENCOUNTER — OFFICE VISIT (OUTPATIENT)
Dept: CARDIOLOGY | Facility: CLINIC | Age: 88
End: 2023-11-22
Payer: MEDICARE

## 2023-11-22 VITALS
SYSTOLIC BLOOD PRESSURE: 138 MMHG | WEIGHT: 148 LBS | BODY MASS INDEX: 23.78 KG/M2 | RESPIRATION RATE: 16 BRPM | DIASTOLIC BLOOD PRESSURE: 70 MMHG | OXYGEN SATURATION: 98 % | HEIGHT: 66 IN | HEART RATE: 69 BPM

## 2023-11-22 DIAGNOSIS — I10 ESSENTIAL HYPERTENSION: ICD-10-CM

## 2023-11-22 DIAGNOSIS — I48.19 PERSISTENT ATRIAL FIBRILLATION (CMS/HCC): ICD-10-CM

## 2023-11-22 DIAGNOSIS — E78.49 OTHER HYPERLIPIDEMIA: ICD-10-CM

## 2023-11-22 DIAGNOSIS — R00.2 PALPITATIONS: Primary | ICD-10-CM

## 2023-11-22 DIAGNOSIS — R07.89 ATYPICAL CHEST PAIN: ICD-10-CM

## 2023-11-22 PROCEDURE — 93000 ELECTROCARDIOGRAM COMPLETE: CPT | Performed by: INTERNAL MEDICINE

## 2023-11-22 PROCEDURE — 99214 OFFICE O/P EST MOD 30 MIN: CPT | Performed by: INTERNAL MEDICINE

## 2023-11-22 ASSESSMENT — ENCOUNTER SYMPTOMS
SYNCOPE: 0
LIGHT-HEADEDNESS: 0
PALPITATIONS: 1
DIZZINESS: 0
NEAR-SYNCOPE: 0
PND: 0
ORTHOPNEA: 0
DYSPNEA ON EXERTION: 0
IRREGULAR HEARTBEAT: 0

## 2023-11-22 NOTE — PROGRESS NOTES
Cardiology Consult/New Patient    Reason for visit: Atrial fibrillation.  Hypertension.    HPI Shalini presents today for evaluation she is an 88-year-old female who is referred to me by my partner Dr. Storm for general cardiology management.  She sees her for paroxysmal atrial fibrillation.  She is on Eliquis for stroke prophylaxis.  She has only been taking half dose Eliquis and I explained to her along with Dr. Storm that she should be on full dose Eliquis 5 mg twice a day.    Shalini tells me that she has been having some intermittent left-sided heaviness.  It is not effort related.  It lasts for less than 5 minutes.  There is no associated shortness of breath.  She had gone to the emergency room in early October with a quivering in her chest and chest pressure that lasted for approximately 30 minutes to an hour.  It was also associated with nausea.  Her workup in the emergency room was negative and she was discharged.  Chest x-ray did not show any abnormalities and her EKG did not show any acute ST-T wave changes.    She tells me since that time she has had 1 or 2 episodes but they have not been prolonged.  They are not effort related.    She has not had any neurological or TIA-like symptoms.  No slurred speech, unilateral weakness or visual loss.    Shalini has a history of hypertension, atrial fibrillation, hyperlipidemia and history of DVT in 2019.    She had an echocardiogram in March that showed normal LV size and wall thickness with no regional wall motion abnormalities.  Ejection fraction was 55%.  There is grade 1 diastolic dysfunction.  She had moderate aortic insufficiency and her aorta was top normal in size to mildly dilated at 3.8 cm.  There is bileaflet mitral valve thickening with mild mitral regurgitation.  Her estimated RV systolic pressure was 27 mmHg.    Shalini does not have any previous history of coronary artery disease or myocardial infarction.  There is no diabetes.  There is no history of  smoking.    Her EKG today in the office was normal.    Medical History:   Past Medical History:   Diagnosis Date    Anemia     Arthritis     thumbs    Deep vein thrombosis (CMS/HCC)     2019    Hypertension     Lightheadedness     Lipid disorder     Liver disease        Surgical History:   Past Surgical History:   Procedure Laterality Date    ABDOMINAL SURGERY      2019    ADENOIDECTOMY Bilateral     CHOLECYSTECTOMY  10/03/2011    EYE SURGERY      2020     GANGLION CYST EXCISION      GASTRIC BYPASS      TONSILLECTOMY         Family History:   Family History   Problem Relation Age of Onset    Alzheimer's disease Biological Mother     Heart failure Biological Father     No Known Problems Biological Sister         Social History:    Social History     Tobacco Use    Smoking status: Never    Smokeless tobacco: Never   Substance Use Topics    Alcohol use: Yes     Alcohol/week: 1.0 standard drink of alcohol     Types: 1 Glasses of wine per week     Comment: half one at dinner         Allergies: Patient has no known allergies.    Current Outpatient Medications   Medication Instructions    atorvastatin (LIPITOR) 5 mg, oral, Daily    calcium carbonate-vitamin D3 (CALCIUM 600 WITH VITAMIN D3) 600 mg(1,500mg) -400 unit tablet,chewable 600 mg, oral, Once    cholecalciferol (vitamin D3) 1,000 Units, oral, Daily    cyanocobalamin (VITAMIN B12) 1,000 mcg, oral, Daily    ELIQUIS 2.5 mg tablet TAKE 1 TABLET BY MOUTH  TWICE DAILY    fexofenadine (ALLEGRA) 180 mg, oral, Once    iron, carbonyl 25 mg, oral, Daily    metoprolol succinate XL (TOPROL-XL) 25 mg, oral, Daily    multivitamin capsule 1 capsule, oral, Daily    omeprazole (PRILOSEC) 40 mg, oral, Daily    SUMAtriptan (IMITREX) 50 mg, oral, Once as needed    triamcinolone 55 mcg nasal inhaler 2 sprays, nasal, Daily PRN        Review of Systems  Review of Systems   Cardiovascular: Positive for chest pain and palpitations. Negative for dyspnea on  exertion, irregular heartbeat, leg swelling, near-syncope, orthopnea, paroxysmal nocturnal dyspnea and syncope.   Neurological: Negative for dizziness and light-headedness.       Objective     Vitals:    11/22/23 1110   BP: 138/70   Pulse: 69   Resp: 16   SpO2: 98%       Physical Exam  Constitutional:       Appearance: She is well-developed.   Neck:      Vascular: No carotid bruit or JVD.   Cardiovascular:      Rate and Rhythm: Normal rate and regular rhythm.      Pulses:           Carotid pulses are 2+ on the right side and 2+ on the left side.       Dorsalis pedis pulses are 2+ on the right side and 2+ on the left side.        Posterior tibial pulses are 2+ on the right side and 2+ on the left side.      Heart sounds: S1 normal and S2 normal. Murmur heard.      High-pitched blowing decrescendo early diastolic murmur is present with a grade of 1/4 at the upper right sternal border radiating to the apex.     No friction rub. No gallop.   Pulmonary:      Effort: Pulmonary effort is normal.      Breath sounds: Normal breath sounds.   Musculoskeletal:      Right lower leg: No edema.      Left lower leg: No edema.   Skin:     General: Skin is warm and dry.   Neurological:      Mental Status: She is alert.   Psychiatric:         Behavior: Behavior normal.            Transthoracic echocardiogram.  3/7/2023    Left Ventricle: Normal ventricle size. Normal wall thickness. Estimated EF 55%. No regional wall motion abnormalities. Grade I diastolic dysfunction.    Right Ventricle: Normal ventricle size. Low normal systolic function.    Left Atrium: Mildly dilated atrium.    Right Atrium: Normal sized atrium.    Aortic Valve: Tricuspid valve. Moderate regurgitation. No stenosis.  Aorta top normal 3.8 cm    Mitral Valve: Bileaflet thickening. Mild regurgitation. The jet is eccentric.    Tricuspid Valve: Normal structure. Mild regurgitation. Estimated RVSP = 27 mmHg.    Pulmonic Valve: Normal structure. Trace  regurgitation.    IVC/SVC: Inferior vena cava is <2.1cm. Inferior vena cava collapses >50% during inspiration.    Pericardium: Normal structure.         ECG.  Normal sinus rhythm.  Normal EKG.      Assessment/Plan     Atypical chest pain  I feel the choices chest discomfort is atypical for cardiac etiology but she has not had any ischemic workup in the recent past and I will have her get a pharmacologic nuclear stress test to look for any evidence of ischemia.    I reviewed the EKG and echocardiogram with her.    Essential hypertension  Blood pressure today was mildly elevated at 138/70.  Herlinda is currently on Toprol-XL 25 mg daily but states that she is somewhat anxious today as this is her first visit.    I will continue to monitor her blood pressure.  Her resting heart rate was approximately 70 in the office today and if needed, her Toprol can be increased to 50 mg daily or if her heart rate is lower we can add low-dose amlodipine to her regimen 2.5 to 5 mg daily.    Atrial fibrillation (CMS/HCC)  Shalini is currently in sinus rhythm.  She will continue to follow-up with EPS.  I have again discussed with her about consideration for being at a higher dose of Eliquis as she is not adequately anticoagulated.  Although she is over 82, she weighs 67 kg and her creatinine is 0.8 and the therapeutic dose would be 5 mg twice a day but she is extremely recalcitrant to this because of her prior history of GI bleed which was many years ago.    Other hyperlipidemia  Continue atorvastatin 10 mg daily.  I have given Shalini a prescription to get a follow-up lipid profile and CMP.    Counseled on a low-fat low-cholesterol low-salt diet as well as trying to walk for exercise and continue to be active.    We had a prolonged discussion about these complex clinical issues and went over the various important aspects to consider. All questions were answered.      Shalini will be coming to for her day-to-day care.  She will follow-up  with me after the tests are performed.  I will keep you informed the results as well as her progress.  She will call with any questions or concerns in the interim.    If you have any questions, if I can be of any further assistance, please do not hesitate to contact me.    I spent 45 minutes on this date of service performing the following activities: obtaining history, performing examination, entering orders, documenting, preparing for visit, obtaining / reviewing records, providing counseling and education and communicating results.         Ron Jose MD  11/28/2023

## 2023-11-22 NOTE — LETTER
November 22, 2023     Eleni Storm MD  [address] [facility] [address]  [place] [facility]  [facility] [address]    Patient: Shalini Kunz  YOB: 1935  Date of Visit: 11/22/2023      Dear Dr. Storm:    Thank you for referring Shalini Kunz to me for evaluation. Below are my notes for this consultation.    If you have questions, please do not hesitate to call me. I look forward to following your patient along with you.         Sincerely,        Ron Jose MD        CC: No Recipients    Ron Jose MD  11/22/2023 11:49 AM  Sign when Signing Visit  Cardiology Consult/New Patient    Reason for visit: Atrial fibrillation.  Hypertension.    HPI     Medical History:   Past Medical History:   Diagnosis Date    Anemia     Arthritis     thumbs    Deep vein thrombosis (CMS/HCC)     2019    Hypertension     Lightheadedness     Lipid disorder     Liver disease        Surgical History:   Past Surgical History:   Procedure Laterality Date    ABDOMINAL SURGERY      2019    ADENOIDECTOMY Bilateral     CHOLECYSTECTOMY  10/03/2011    EYE SURGERY      2020     GANGLION CYST EXCISION      GASTRIC BYPASS      TONSILLECTOMY         Family History:   Family History   Problem Relation Age of Onset    Alzheimer's disease Biological Mother     Heart failure Biological Father     No Known Problems Biological Sister         Social History:    Social History     Tobacco Use    Smoking status: Never    Smokeless tobacco: Never   Substance Use Topics    Alcohol use: Yes     Alcohol/week: 1.0 standard drink of alcohol     Types: 1 Glasses of wine per week     Comment: half one at dinner         Allergies: Patient has no known allergies.    Current Outpatient Medications   Medication Instructions    atorvastatin (LIPITOR) 5 mg, oral, Daily    calcium carbonate-vitamin D3 (CALCIUM 600 WITH VITAMIN D3) 600 mg(1,500mg) -400 unit tablet,chewable 600 mg, oral, Once    cholecalciferol (vitamin D3) 1,000 Units, oral, Daily     cyanocobalamin (VITAMIN B12) 1,000 mcg, oral, Daily    ELIQUIS 2.5 mg tablet TAKE 1 TABLET BY MOUTH  TWICE DAILY    fexofenadine (ALLEGRA) 180 mg, oral, Once    iron, carbonyl 25 mg, oral, Daily    metoprolol succinate XL (TOPROL-XL) 25 mg, oral, Daily    multivitamin capsule 1 capsule, oral, Daily    omeprazole (PRILOSEC) 40 mg, oral, Daily    SUMAtriptan (IMITREX) 50 mg, oral, Once as needed    triamcinolone 55 mcg nasal inhaler 2 sprays, nasal, Daily PRN        Review of Systems  Review of Systems   Cardiovascular: Positive for chest pain and palpitations. Negative for dyspnea on exertion, irregular heartbeat, leg swelling, near-syncope, orthopnea, paroxysmal nocturnal dyspnea and syncope.   Neurological: Negative for dizziness and light-headedness.       Objective     Vitals:    11/22/23 1110   BP: 138/70   Pulse: 69   Resp: 16   SpO2: 98%       Physical Exam       Transthoracic echocardiogram.  3/7/2023    Left Ventricle: Normal ventricle size. Normal wall thickness. Estimated EF 55%. No regional wall motion abnormalities. Grade I diastolic dysfunction.    Right Ventricle: Normal ventricle size. Low normal systolic function.    Left Atrium: Mildly dilated atrium.    Right Atrium: Normal sized atrium.    Aortic Valve: Tricuspid valve. Moderate regurgitation. No stenosis.  Aorta top normal 3.8 cm    Mitral Valve: Bileaflet thickening. Mild regurgitation. The jet is eccentric.    Tricuspid Valve: Normal structure. Mild regurgitation. Estimated RVSP = 27 mmHg.    Pulmonic Valve: Normal structure. Trace regurgitation.    IVC/SVC: Inferior vena cava is <2.1cm. Inferior vena cava collapses >50% during inspiration.    Pericardium: Normal structure.         ECG.  Normal sinus rhythm.  Normal EKG.      Assessment/Plan     No problem-specific Assessment & Plan notes found for this encounter.            Ron Jose MD  11/22/2023

## 2023-11-22 NOTE — LETTER
November 28, 2023     Renée Pittman DO  [address] [facility]  [facility] [address]    Patient: Shalini Kunz  YOB: 1935  Date of Visit: 11/22/2023      Dear Dr. Pittman:    Thank you for referring Shalini Kunz to me for evaluation. Below are my notes for this consultation.    If you have questions, please do not hesitate to call me. I look forward to following your patient along with you.         Sincerely,        Ron Jose MD        CC: MD Damon Oliveira Michael, MD  11/28/2023  8:07 AM  Signed  Cardiology Consult/New Patient    Reason for visit: Atrial fibrillation.  Hypertension.    HPI Shalini presents today for evaluation she is an 88-year-old female who is referred to me by my partner Dr. Storm for general cardiology management.  She sees her for paroxysmal atrial fibrillation.  She is on Eliquis for stroke prophylaxis.  She has only been taking half dose Eliquis and I explained to her along with Dr. Storm that she should be on full dose Eliquis 5 mg twice a day.    Shalini tells me that she has been having some intermittent left-sided heaviness.  It is not effort related.  It lasts for less than 5 minutes.  There is no associated shortness of breath.  She had gone to the emergency room in early October with a quivering in her chest and chest pressure that lasted for approximately 30 minutes to an hour.  It was also associated with nausea.  Her workup in the emergency room was negative and she was discharged.  Chest x-ray did not show any abnormalities and her EKG did not show any acute ST-T wave changes.    She tells me since that time she has had 1 or 2 episodes but they have not been prolonged.  They are not effort related.    She has not had any neurological or TIA-like symptoms.  No slurred speech, unilateral weakness or visual loss.    Shalini has a history of hypertension, atrial fibrillation, hyperlipidemia and history of DVT in 2019.    She had an echocardiogram  in March that showed normal LV size and wall thickness with no regional wall motion abnormalities.  Ejection fraction was 55%.  There is grade 1 diastolic dysfunction.  She had moderate aortic insufficiency and her aorta was top normal in size to mildly dilated at 3.8 cm.  There is bileaflet mitral valve thickening with mild mitral regurgitation.  Her estimated RV systolic pressure was 27 mmHg.    Shalini does not have any previous history of coronary artery disease or myocardial infarction.  There is no diabetes.  There is no history of smoking.    Her EKG today in the office was normal.    Medical History:   Past Medical History:   Diagnosis Date    Anemia     Arthritis     thumbs    Deep vein thrombosis (CMS/HCC)     2019    Hypertension     Lightheadedness     Lipid disorder     Liver disease        Surgical History:   Past Surgical History:   Procedure Laterality Date    ABDOMINAL SURGERY      2019    ADENOIDECTOMY Bilateral     CHOLECYSTECTOMY  10/03/2011    EYE SURGERY      2020     GANGLION CYST EXCISION      GASTRIC BYPASS      TONSILLECTOMY         Family History:   Family History   Problem Relation Age of Onset    Alzheimer's disease Biological Mother     Heart failure Biological Father     No Known Problems Biological Sister         Social History:    Social History     Tobacco Use    Smoking status: Never    Smokeless tobacco: Never   Substance Use Topics    Alcohol use: Yes     Alcohol/week: 1.0 standard drink of alcohol     Types: 1 Glasses of wine per week     Comment: half one at dinner         Allergies: Patient has no known allergies.    Current Outpatient Medications   Medication Instructions    atorvastatin (LIPITOR) 5 mg, oral, Daily    calcium carbonate-vitamin D3 (CALCIUM 600 WITH VITAMIN D3) 600 mg(1,500mg) -400 unit tablet,chewable 600 mg, oral, Once    cholecalciferol (vitamin D3) 1,000 Units, oral, Daily    cyanocobalamin (VITAMIN B12) 1,000 mcg, oral, Daily     ELIQUIS 2.5 mg tablet TAKE 1 TABLET BY MOUTH  TWICE DAILY    fexofenadine (ALLEGRA) 180 mg, oral, Once    iron, carbonyl 25 mg, oral, Daily    metoprolol succinate XL (TOPROL-XL) 25 mg, oral, Daily    multivitamin capsule 1 capsule, oral, Daily    omeprazole (PRILOSEC) 40 mg, oral, Daily    SUMAtriptan (IMITREX) 50 mg, oral, Once as needed    triamcinolone 55 mcg nasal inhaler 2 sprays, nasal, Daily PRN        Review of Systems  Review of Systems   Cardiovascular: Positive for chest pain and palpitations. Negative for dyspnea on exertion, irregular heartbeat, leg swelling, near-syncope, orthopnea, paroxysmal nocturnal dyspnea and syncope.   Neurological: Negative for dizziness and light-headedness.       Objective     Vitals:    11/22/23 1110   BP: 138/70   Pulse: 69   Resp: 16   SpO2: 98%       Physical Exam  Constitutional:       Appearance: She is well-developed.   Neck:      Vascular: No carotid bruit or JVD.   Cardiovascular:      Rate and Rhythm: Normal rate and regular rhythm.      Pulses:           Carotid pulses are 2+ on the right side and 2+ on the left side.       Dorsalis pedis pulses are 2+ on the right side and 2+ on the left side.        Posterior tibial pulses are 2+ on the right side and 2+ on the left side.      Heart sounds: S1 normal and S2 normal. Murmur heard.      High-pitched blowing decrescendo early diastolic murmur is present with a grade of 1/4 at the upper right sternal border radiating to the apex.     No friction rub. No gallop.   Pulmonary:      Effort: Pulmonary effort is normal.      Breath sounds: Normal breath sounds.   Musculoskeletal:      Right lower leg: No edema.      Left lower leg: No edema.   Skin:     General: Skin is warm and dry.   Neurological:      Mental Status: She is alert.   Psychiatric:         Behavior: Behavior normal.            Transthoracic echocardiogram.  3/7/2023    Left Ventricle: Normal ventricle size. Normal wall thickness. Estimated EF 55%.  No regional wall motion abnormalities. Grade I diastolic dysfunction.    Right Ventricle: Normal ventricle size. Low normal systolic function.    Left Atrium: Mildly dilated atrium.    Right Atrium: Normal sized atrium.    Aortic Valve: Tricuspid valve. Moderate regurgitation. No stenosis.  Aorta top normal 3.8 cm    Mitral Valve: Bileaflet thickening. Mild regurgitation. The jet is eccentric.    Tricuspid Valve: Normal structure. Mild regurgitation. Estimated RVSP = 27 mmHg.    Pulmonic Valve: Normal structure. Trace regurgitation.    IVC/SVC: Inferior vena cava is <2.1cm. Inferior vena cava collapses >50% during inspiration.    Pericardium: Normal structure.         ECG.  Normal sinus rhythm.  Normal EKG.      Assessment/Plan     Atypical chest pain  I feel the choices chest discomfort is atypical for cardiac etiology but she has not had any ischemic workup in the recent past and I will have her get a pharmacologic nuclear stress test to look for any evidence of ischemia.    I reviewed the EKG and echocardiogram with her.    Essential hypertension  Blood pressure today was mildly elevated at 138/70.  Herlinda is currently on Toprol-XL 25 mg daily but states that she is somewhat anxious today as this is her first visit.    I will continue to monitor her blood pressure.  Her resting heart rate was approximately 70 in the office today and if needed, her Toprol can be increased to 50 mg daily or if her heart rate is lower we can add low-dose amlodipine to her regimen 2.5 to 5 mg daily.    Atrial fibrillation (CMS/HCC)  Shalini is currently in sinus rhythm.  She will continue to follow-up with EPS.  I have again discussed with her about consideration for being at a higher dose of Eliquis as she is not adequately anticoagulated.  Although she is over 82, she weighs 67 kg and her creatinine is 0.8 and the therapeutic dose would be 5 mg twice a day but she is extremely recalcitrant to this because of her prior  history of GI bleed which was many years ago.    Other hyperlipidemia  Continue atorvastatin 10 mg daily.  I have given Shalini a prescription to get a follow-up lipid profile and CMP.    Counseled on a low-fat low-cholesterol low-salt diet as well as trying to walk for exercise and continue to be active.    We had a prolonged discussion about these complex clinical issues and went over the various important aspects to consider. All questions were answered.      Shalini will be coming to for her day-to-day care.  She will follow-up with me after the tests are performed.  I will keep you informed the results as well as her progress.  She will call with any questions or concerns in the interim.    If you have any questions, if I can be of any further assistance, please do not hesitate to contact me.    I spent 45 minutes on this date of service performing the following activities: obtaining history, performing examination, entering orders, documenting, preparing for visit, obtaining / reviewing records, providing counseling and education and communicating results.        Ron Jose MD  11/28/2023

## 2023-11-22 NOTE — PATIENT INSTRUCTIONS
Patient Education   Mediterranean Diet  A Mediterranean diet refers to food and lifestyle choices that are based on the traditions of countries located on the [place]. It focuses on eating more fruits, vegetables, whole grains, beans, nuts, seeds, and heart-healthy fats, and eating less dairy, meat, eggs, and processed foods with added sugar, salt, and fat. This way of eating has been shown to help prevent certain conditions and improve outcomes for people who have chronic diseases, like kidney disease and heart disease.  What are tips for following this plan?  Reading food labels  Check the serving size of packaged foods. For foods such as rice and pasta, the serving size refers to the amount of cooked product, not dry.  Check the total fat in packaged foods. Avoid foods that have saturated fat or trans fats.  Check the ingredient list for added sugars, such as corn syrup.  Shopping    Buy a variety of foods that offer a balanced diet, including:  Fresh fruits and vegetables (produce).  Grains, beans, nuts, and seeds. Some of these may be available in unpackaged forms or large amounts (in bulk).  Fresh seafood.  Poultry and eggs.  Low-fat dairy products.  Buy whole ingredients instead of prepackaged foods.  Buy fresh fruits and vegetables in-season from local [place] markets.  Buy plain frozen fruits and vegetables.  If you do not have access to quality fresh seafood, buy precooked frozen shrimp or canned fish, such as tuna, salmon, or sardines.  Stock your pantry so you always have certain foods on hand, such as olive oil, canned tuna, canned tomatoes, rice, pasta, and beans.  Cooking  Cook foods with extra-virgin olive oil instead of using butter or other vegetable oils.  Have meat as a side dish, and have vegetables or grains as your main dish. This means having meat in small portions or adding small amounts of meat to foods like pasta or stew.  Use beans or vegetables instead of meat in common dishes  like chili or lasagna.  [place] with different cooking methods. Try roasting, broiling, steaming, and sautéing vegetables.  Add frozen vegetables to soups, stews, pasta, or rice.  Add nuts or seeds for added healthy fats and plant protein at each meal. You can add these to yogurt, salads, or vegetable dishes.  Marinate fish or vegetables using olive oil, lemon juice, garlic, and fresh herbs.  Meal planning  Plan to eat one vegetarian meal one day each week. Try to work up to two vegetarian meals, if possible.  Eat seafood two or more times a week.  Have healthy snacks readily available, such as:  Vegetable sticks with hummus.  Greek yogurt.  Fruit and nut trail mix.  Eat balanced meals throughout the week. This includes:  Fruit: 2-3 servings a day.  Vegetables: 4-5 servings a day.  Low-fat dairy: 2 servings a day.  Fish, poultry, or lean meat: 1 serving a day.  Beans and legumes: 2 or more servings a week.  Nuts and seeds: 1-2 servings a day.  Whole grains: 6-8 servings a day.  Extra-virgin olive oil: 3-4 servings a day.  Limit red meat and sweets to only a few servings a month.  Lifestyle    Cook and eat meals together with your family, when possible.  Drink enough fluid to keep your urine pale yellow.  Be physically active every day. This includes:  Aerobic exercise like running or swimming.  Leisure activities like gardening, walking, or housework.  Get 7-8 hours of sleep each night.  If recommended by your health care provider, drink red wine in moderation. This means 1 glass a day for nonpregnant women and 2 glasses a day for men. A glass of wine equals 5 oz (150 mL).  What foods should I eat?  Fruits  Apples. Apricots. Avocado. Berries. Bananas. Cherries. Dates. Figs. Grapes. Sal. Melon. Oranges. Peaches. Plums. Pomegranate.  Vegetables  Artichokes. Beets. Broccoli. Cabbage. Carrots. Eggplant. Green beans. Chard. Kale. Spinach. Onions. Leeks. Peas. Squash. Tomatoes. Peppers.  Radishes.  Grains  Whole-grain pasta. Brown rice. Bulgur wheat. Polenta. Couscous. Whole-wheat bread. Oatmeal. Quinoa.  Meats and other proteins  Beans. Almonds. Sunflower seeds. Pine nuts. Peanuts. Cod. [place]. Scallops. Shrimp. Tuna. Tilapia. Clams. Oysters. Eggs. Poultry without skin.  Dairy  Low-fat milk. Cheese. Greek yogurt.  Fats and oils  Extra-virgin olive oil. Avocado oil. Grapeseed oil.  Beverages  Water. Red wine. Herbal tea.  Sweets and desserts  Greek yogurt with honey. Baked apples. Poached pears. Trail mix.  Seasonings and condiments  Basil. Cilantro. Coriander. Cumin. Mint. Parsley. Yang. Rosemary. Tarragon. Garlic. Oregano. Thyme. Pepper. Balsamic vinegar. Tahini. Hummus. Tomato sauce. Olives. Mushrooms.  The items listed above may not be a complete list of foods and beverages you can eat. Contact a dietitian for more information.  What foods should I limit?  This is a list of foods that should be eaten rarely or only on special occasions.  Fruits  Fruit canned in syrup.  Vegetables  Deep-fried potatoes (french fries).  Grains  Prepackaged pasta or rice dishes. Prepackaged cereal with added sugar. Prepackaged snacks with added sugar.  Meats and other proteins  Beef. Pork. Lamb. Poultry with skin. Hot dogs. Vivar.  Dairy  Ice cream. Sour cream. Whole milk.  Fats and oils  Butter. Canola oil. Vegetable oil. Beef fat (tallow). Lard.  Beverages  Juice. Sugar-sweetened soft drinks. Beer. Liquor and spirits.  Sweets and desserts  Cookies. Cakes. Pies. Candy.  Seasonings and condiments  Mayonnaise. Pre-made sauces and marinades.  The items listed above may not be a complete list of foods and beverages you should limit. Contact a dietitian for more information.  Summary  The Mediterranean diet includes both food and lifestyle choices.  Eat a variety of fresh fruits and vegetables, beans, nuts, seeds, and whole grains.  Limit the amount of red meat and sweets that you eat.  If recommended by your health  care provider, drink red wine in moderation. This means 1 glass a day for nonpregnant women and 2 glasses a day for men. A glass of wine equals 5 oz (150 mL).  This information is not intended to replace advice given to you by your health care provider. Make sure you discuss any questions you have with your health care provider.  Document Revised: 01/22/2021 Document Reviewed: 11/19/2020  Elsevier Patient Education © 2023 [employer]vier Inc.

## 2023-11-28 NOTE — ASSESSMENT & PLAN NOTE
Shalini is currently in sinus rhythm.  She will continue to follow-up with EPS.  I have again discussed with her about consideration for being at a higher dose of Eliquis as she is not adequately anticoagulated.  Although she is over 82, she weighs 67 kg and her creatinine is 0.8 and the therapeutic dose would be 5 mg twice a day but she is extremely recalcitrant to this because of her prior history of GI bleed which was many years ago.

## 2023-11-28 NOTE — ASSESSMENT & PLAN NOTE
I feel the choices chest discomfort is atypical for cardiac etiology but she has not had any ischemic workup in the recent past and I will have her get a pharmacologic nuclear stress test to look for any evidence of ischemia.    I reviewed the EKG and echocardiogram with her.

## 2023-11-28 NOTE — ASSESSMENT & PLAN NOTE
Blood pressure today was mildly elevated at 138/70.  Herlinda is currently on Toprol-XL 25 mg daily but states that she is somewhat anxious today as this is her first visit.    I will continue to monitor her blood pressure.  Her resting heart rate was approximately 70 in the office today and if needed, her Toprol can be increased to 50 mg daily or if her heart rate is lower we can add low-dose amlodipine to her regimen 2.5 to 5 mg daily.

## 2023-11-28 NOTE — ASSESSMENT & PLAN NOTE
Continue atorvastatin 10 mg daily.  I have given Shalini a prescription to get a follow-up lipid profile and CMP.    Counseled on a low-fat low-cholesterol low-salt diet as well as trying to walk for exercise and continue to be active.

## 2023-12-01 ENCOUNTER — LAB REQUISITION (OUTPATIENT)
Dept: LAB | Facility: HOSPITAL | Age: 88
End: 2023-12-01
Attending: INTERNAL MEDICINE
Payer: MEDICARE

## 2023-12-01 DIAGNOSIS — E78.49 OTHER HYPERLIPIDEMIA: ICD-10-CM

## 2023-12-01 LAB
ALBUMIN SERPL-MCNC: 4.3 G/DL (ref 3.5–5.7)
ALP SERPL-CCNC: 87 IU/L (ref 34–125)
ALT SERPL-CCNC: 12 IU/L (ref 7–52)
ANION GAP SERPL CALC-SCNC: 4 MEQ/L (ref 3–15)
AST SERPL-CCNC: 18 IU/L (ref 13–39)
BILIRUB SERPL-MCNC: 0.6 MG/DL (ref 0.3–1.2)
BUN SERPL-MCNC: 24 MG/DL (ref 7–25)
CALCIUM SERPL-MCNC: 9.6 MG/DL (ref 8.6–10.3)
CHLORIDE SERPL-SCNC: 107 MEQ/L (ref 98–107)
CHOLEST SERPL-MCNC: 173 MG/DL
CO2 SERPL-SCNC: 27 MEQ/L (ref 21–31)
CREAT SERPL-MCNC: 0.7 MG/DL (ref 0.6–1.2)
GFR SERPL CREATININE-BSD FRML MDRD: >60 ML/MIN/1.73M*2
GLUCOSE SERPL-MCNC: 83 MG/DL (ref 70–99)
HDLC SERPL-MCNC: 61 MG/DL
HDLC SERPL: 2.8 {RATIO}
LDLC SERPL CALC-MCNC: 72 MG/DL
NONHDLC SERPL-MCNC: 112 MG/DL
POTASSIUM SERPL-SCNC: 4.6 MEQ/L (ref 3.5–5.1)
PROT SERPL-MCNC: 6.3 G/DL (ref 6–8.2)
SODIUM SERPL-SCNC: 138 MEQ/L (ref 136–145)
TRIGL SERPL-MCNC: 201 MG/DL

## 2023-12-01 PROCEDURE — 36415 COLL VENOUS BLD VENIPUNCTURE: CPT | Performed by: INTERNAL MEDICINE

## 2023-12-01 PROCEDURE — 80053 COMPREHEN METABOLIC PANEL: CPT | Performed by: INTERNAL MEDICINE

## 2023-12-01 PROCEDURE — 80061 LIPID PANEL: CPT | Performed by: INTERNAL MEDICINE

## 2024-01-14 ENCOUNTER — LAB REQUISITION (OUTPATIENT)
Dept: LAB | Facility: HOSPITAL | Age: 89
End: 2024-01-14
Attending: FAMILY MEDICINE
Payer: MEDICARE

## 2024-01-14 DIAGNOSIS — Z11.59 ENCOUNTER FOR SCREENING FOR OTHER VIRAL DISEASES: ICD-10-CM

## 2024-01-14 LAB — SARS-COV-2 RNA RESP QL NAA+PROBE: NEGATIVE

## 2024-01-14 PROCEDURE — 87635 SARS-COV-2 COVID-19 AMP PRB: CPT | Performed by: FAMILY MEDICINE

## 2024-01-16 ENCOUNTER — LAB REQUISITION (OUTPATIENT)
Dept: LAB | Facility: HOSPITAL | Age: 89
End: 2024-01-16
Attending: FAMILY MEDICINE
Payer: MEDICARE

## 2024-01-16 DIAGNOSIS — R05.1 ACUTE COUGH: ICD-10-CM

## 2024-01-16 DIAGNOSIS — B97.4 RESPIRATORY SYNCYTIAL VIRUS AS THE CAUSE OF DISEASES CLASSIFIED ELSEWHERE: ICD-10-CM

## 2024-01-16 DIAGNOSIS — J11.1 INFLUENZA DUE TO UNIDENTIFIED INFLUENZA VIRUS WITH OTHER RESPIRATORY MANIFESTATIONS: ICD-10-CM

## 2024-01-16 DIAGNOSIS — R09.81 NASAL CONGESTION: ICD-10-CM

## 2024-01-16 LAB
FLUAV RNA SPEC QL NAA+PROBE: POSITIVE
FLUBV RNA SPEC QL NAA+PROBE: NEGATIVE
RSV RNA SPEC QL NAA+PROBE: NEGATIVE
SARS-COV-2 RNA RESP QL NAA+PROBE: NEGATIVE

## 2024-01-16 PROCEDURE — 87637 SARSCOV2&INF A&B&RSV AMP PRB: CPT | Performed by: FAMILY MEDICINE

## 2024-01-16 RX ORDER — ATORVASTATIN CALCIUM 10 MG/1
10 TABLET, FILM COATED ORAL DAILY
Qty: 90 TABLET | Refills: 3 | Status: SHIPPED | OUTPATIENT
Start: 2024-01-16 | End: 2024-01-17

## 2024-01-17 RX ORDER — ATORVASTATIN CALCIUM 10 MG/1
TABLET, FILM COATED ORAL
Qty: 45 TABLET | Refills: 0 | Status: SHIPPED | OUTPATIENT
Start: 2024-01-17 | End: 2024-04-01

## 2024-01-17 RX ORDER — OSELTAMIVIR PHOSPHATE 75 MG/1
75 CAPSULE ORAL 2 TIMES DAILY
Qty: 10 CAPSULE | Refills: 0 | Status: SHIPPED | OUTPATIENT
Start: 2024-01-17 | End: 2024-01-22

## 2024-01-17 RX ORDER — ONDANSETRON 4 MG/1
4 TABLET, FILM COATED ORAL EVERY 8 HOURS PRN
Qty: 20 TABLET | Refills: 0 | Status: SHIPPED | OUTPATIENT
Start: 2024-01-17 | End: 2024-02-17 | Stop reason: ENTERED-IN-ERROR

## 2024-01-17 NOTE — TELEPHONE ENCOUNTER
"DR RANDLE PT req refilL ATORVASTATIN    Last OV note 2023 states \"Continue Atorvastatin 10 MG daily\" however PT was on 5 MG daily    PCP DR BRAN ordered 10 MG 2024.    Please clarify dosing to be ordered.    Thank you    Last labs below      Shalini Kunz ([id number]) Legal Sex   Female    1935       Lipid Panel: Patient Communication     Not Released  Not seen   Results   Lipid Panel [LAB18] (Order [id number])       Contains abnormal data Lipid Panel  Order: [id number]   Status: Final result        Visible to patient: No (not released)        Next appt with me: None        Dx: Other hyperlipidemia        1 Result Note                Component  Ref Range & Units 23 0835 23 0910 22 0842 21 0921    Triglycerides  <150 mg/dL 201 High   237 High   161 High  R  174 High  R     Cholesterol  <=200 mg/dL 173  170  172  173     HDL  >=50 mg/dL 61  52 CM  58 R, CM  52 Low  R, CM    Comment: 2001 NCEP GUIDELINES   <40 mg/dL Low   >=60 mg/dL High    LDL Calculated  <=100 mg/dL 72  71 CM  82 CM  86 CM    Comment: ATP III GUIDELINES   Optimal: <100 mg/dL   Near/Above Optimal: 100-129 mg/dL   Borderline High: 130-159 mg/dL   High: 160-189 mg/dL   Very High: >190 mg/dL     Non-HDL, Calculated  mg/dL 112  118  114  121               "

## 2024-01-29 ENCOUNTER — HOSPITAL ENCOUNTER (OUTPATIENT)
Dept: RADIOLOGY | Facility: HOSPITAL | Age: 89
Setting detail: NUCLEAR MEDICINE
Discharge: HOME | End: 2024-01-29
Attending: INTERNAL MEDICINE
Payer: MEDICARE

## 2024-01-29 VITALS
BODY MASS INDEX: 23.78 KG/M2 | HEIGHT: 66 IN | DIASTOLIC BLOOD PRESSURE: 55 MMHG | HEART RATE: 58 BPM | SYSTOLIC BLOOD PRESSURE: 139 MMHG | WEIGHT: 148 LBS

## 2024-01-29 DIAGNOSIS — R07.89 ATYPICAL CHEST PAIN: ICD-10-CM

## 2024-01-29 DIAGNOSIS — R00.2 PALPITATIONS: ICD-10-CM

## 2024-01-29 LAB
STRESS BASELINE BP: NORMAL MMHG
STRESS BASELINE HR: 57 BPM
STRESS O2 SAT REST: 100 %
STRESS PERCENT HR: 63 %
STRESS POST PEAK BP: NORMAL MMHG
STRESS POST PEAK HR: 85 BPM
STRESS TARGET HR: 114 BPM

## 2024-01-29 PROCEDURE — 78452 HT MUSCLE IMAGE SPECT MULT: CPT | Mod: ME

## 2024-01-29 PROCEDURE — 93018 CV STRESS TEST I&R ONLY: CPT | Performed by: INTERNAL MEDICINE

## 2024-01-29 PROCEDURE — 93017 CV STRESS TEST TRACING ONLY: CPT

## 2024-01-29 PROCEDURE — 93016 CV STRESS TEST SUPVJ ONLY: CPT | Performed by: INTERNAL MEDICINE

## 2024-01-29 PROCEDURE — 63600000 HC DRUGS/DETAIL CODE: Mod: JZ | Performed by: INTERNAL MEDICINE

## 2024-01-29 PROCEDURE — A9500 TC99M SESTAMIBI: HCPCS | Performed by: INTERNAL MEDICINE

## 2024-01-29 RX ORDER — REGADENOSON 0.08 MG/ML
0.4 INJECTION, SOLUTION INTRAVENOUS ONCE
Status: COMPLETED | OUTPATIENT
Start: 2024-01-29 | End: 2024-01-29

## 2024-01-29 RX ADMIN — KIT FOR THE PREPARATION OF TECHNETIUM TC99M SESTAMIBI 32 MILLICURIE: 1 INJECTION, POWDER, LYOPHILIZED, FOR SOLUTION PARENTERAL at 11:11

## 2024-01-29 RX ADMIN — REGADENOSON 0.4 MG: 0.08 INJECTION, SOLUTION INTRAVENOUS at 11:10

## 2024-01-30 PROBLEM — R07.89 ATYPICAL CHEST PAIN: Status: RESOLVED | Noted: 2023-11-22 | Resolved: 2024-01-30

## 2024-01-31 ENCOUNTER — TELEPHONE (OUTPATIENT)
Dept: CARDIOLOGY | Facility: CLINIC | Age: 89
End: 2024-01-31
Payer: MEDICARE

## 2024-01-31 ENCOUNTER — TELEPHONE (OUTPATIENT)
Dept: CARDIOLOGY | Facility: CLINIC | Age: 89
End: 2024-01-31

## 2024-01-31 NOTE — TELEPHONE ENCOUNTER
----- Message from Ron Jose MD sent at 1/29/2024  4:26 PM EST -----  Please call Shalini and tell her her stress test was negative.

## 2024-01-31 NOTE — TELEPHONE ENCOUNTER
----- Message from Ron Jose MD sent at 1/29/2024  1:31 PM EST -----  Please call Shalini and tell her her stress test was negative.

## 2024-01-31 NOTE — TELEPHONE ENCOUNTER
Called and spoke with patient and made her aware that Dr. Guillaume reviewed her Pharmacological Nuclear Stress Test Results.  Let patient know that her her stress test was negative.  Patient asked when she follows up next with Dr. Guillaume.  Told patient that her next appointment is on 05/22/2024 @ 10:40 AM.  Patient verbally understands all above information.

## 2024-02-02 ENCOUNTER — LAB REQUISITION (OUTPATIENT)
Dept: LAB | Facility: HOSPITAL | Age: 89
End: 2024-02-02
Attending: FAMILY MEDICINE
Payer: MEDICARE

## 2024-02-02 ENCOUNTER — OFFICE VISIT (OUTPATIENT)
Dept: INTERNAL MEDICINE | Facility: CLINIC | Age: 89
End: 2024-02-02
Payer: MEDICARE

## 2024-02-02 DIAGNOSIS — E78.49 OTHER HYPERLIPIDEMIA: ICD-10-CM

## 2024-02-02 DIAGNOSIS — I10 ESSENTIAL HYPERTENSION: ICD-10-CM

## 2024-02-02 DIAGNOSIS — E55.9 VITAMIN D DEFICIENCY, UNSPECIFIED: ICD-10-CM

## 2024-02-02 DIAGNOSIS — E78.00 PURE HYPERCHOLESTEROLEMIA, UNSPECIFIED: ICD-10-CM

## 2024-02-02 DIAGNOSIS — I10 ESSENTIAL (PRIMARY) HYPERTENSION: ICD-10-CM

## 2024-02-02 DIAGNOSIS — I48.19 PERSISTENT ATRIAL FIBRILLATION (CMS/HCC): ICD-10-CM

## 2024-02-02 DIAGNOSIS — R73.01 IMPAIRED FASTING GLUCOSE: ICD-10-CM

## 2024-02-02 DIAGNOSIS — E53.8 DEFICIENCY OF OTHER SPECIFIED B GROUP VITAMINS: ICD-10-CM

## 2024-02-02 DIAGNOSIS — I48.0 PAROXYSMAL ATRIAL FIBRILLATION (CMS/HCC): ICD-10-CM

## 2024-02-02 DIAGNOSIS — Z79.01 CHRONIC ANTICOAGULATION: ICD-10-CM

## 2024-02-02 DIAGNOSIS — R41.3 MEMORY CHANGE: Primary | ICD-10-CM

## 2024-02-02 LAB
25(OH)D3 SERPL-MCNC: 33 NG/ML (ref 30–100)
ALBUMIN SERPL-MCNC: 4 G/DL (ref 3.5–5.7)
ALP SERPL-CCNC: 65 IU/L (ref 34–125)
ALT SERPL-CCNC: 13 IU/L (ref 7–52)
ANION GAP SERPL CALC-SCNC: 7 MEQ/L (ref 3–15)
AST SERPL-CCNC: 16 IU/L (ref 13–39)
BASOPHILS # BLD: 0.02 K/UL (ref 0.01–0.1)
BASOPHILS NFR BLD: 0.4 %
BILIRUB SERPL-MCNC: 0.6 MG/DL (ref 0.3–1.2)
BUN SERPL-MCNC: 18 MG/DL (ref 7–25)
CALCIUM SERPL-MCNC: 9.1 MG/DL (ref 8.6–10.3)
CHLORIDE SERPL-SCNC: 105 MEQ/L (ref 98–107)
CHOLEST SERPL-MCNC: 154 MG/DL
CO2 SERPL-SCNC: 26 MEQ/L (ref 21–31)
CREAT SERPL-MCNC: 0.8 MG/DL (ref 0.6–1.2)
DIFFERENTIAL METHOD BLD: NORMAL
EGFRCR SERPLBLD CKD-EPI 2021: >60 ML/MIN/1.73M*2
EOSINOPHIL # BLD: 0.05 K/UL (ref 0.04–0.36)
EOSINOPHIL NFR BLD: 0.9 %
ERYTHROCYTE [DISTWIDTH] IN BLOOD BY AUTOMATED COUNT: 12.3 % (ref 11.7–14.4)
EST. AVERAGE GLUCOSE BLD GHB EST-MCNC: 108 MG/DL
GLUCOSE SERPL-MCNC: 92 MG/DL (ref 70–99)
HBA1C MFR BLD: 5.4 %
HCT VFR BLD AUTO: 43.4 % (ref 35–45)
HDLC SERPL-MCNC: 57 MG/DL
HDLC SERPL: 2.7 {RATIO}
HGB BLD-MCNC: 14.2 G/DL (ref 11.8–15.7)
IMM GRANULOCYTES # BLD AUTO: 0.01 K/UL (ref 0–0.08)
IMM GRANULOCYTES NFR BLD AUTO: 0.2 %
LDLC SERPL CALC-MCNC: 58 MG/DL
LYMPHOCYTES # BLD: 1.93 K/UL (ref 1.2–3.5)
LYMPHOCYTES NFR BLD: 36.4 %
MCH RBC QN AUTO: 30.5 PG (ref 28–33.2)
MCHC RBC AUTO-ENTMCNC: 32.7 G/DL (ref 32.2–35.5)
MCV RBC AUTO: 93.3 FL (ref 83–98)
MONOCYTES # BLD: 0.57 K/UL (ref 0.28–0.8)
MONOCYTES NFR BLD: 10.8 %
NEUTROPHILS # BLD: 2.72 K/UL (ref 1.7–7)
NEUTS SEG NFR BLD: 51.3 %
NONHDLC SERPL-MCNC: 97 MG/DL
NRBC BLD-RTO: 0 %
PDW BLD AUTO: 11.2 FL (ref 9.4–12.3)
PLATELET # BLD AUTO: 260 K/UL (ref 150–369)
POTASSIUM SERPL-SCNC: 4.1 MEQ/L (ref 3.5–5.1)
PROT SERPL-MCNC: 5.9 G/DL (ref 6–8.2)
RBC # BLD AUTO: 4.65 M/UL (ref 3.93–5.22)
SODIUM SERPL-SCNC: 138 MEQ/L (ref 136–145)
T4 FREE SERPL-MCNC: 1.32 NG/DL (ref 0.58–1.64)
TRIGL SERPL-MCNC: 196 MG/DL
TSH SERPL DL<=0.05 MIU/L-ACNC: 3.52 MIU/L (ref 0.34–5.6)
VIT B12 SERPL-MCNC: 643 PG/ML (ref 180–914)
WBC # BLD AUTO: 5.3 K/UL (ref 3.8–10.5)

## 2024-02-02 PROCEDURE — 82306 VITAMIN D 25 HYDROXY: CPT | Performed by: FAMILY MEDICINE

## 2024-02-02 PROCEDURE — 80061 LIPID PANEL: CPT | Performed by: FAMILY MEDICINE

## 2024-02-02 PROCEDURE — 85025 COMPLETE CBC W/AUTO DIFF WBC: CPT | Performed by: FAMILY MEDICINE

## 2024-02-02 PROCEDURE — 99215 OFFICE O/P EST HI 40 MIN: CPT | Performed by: FAMILY MEDICINE

## 2024-02-02 PROCEDURE — 84439 ASSAY OF FREE THYROXINE: CPT | Performed by: FAMILY MEDICINE

## 2024-02-02 PROCEDURE — 84443 ASSAY THYROID STIM HORMONE: CPT | Performed by: FAMILY MEDICINE

## 2024-02-02 PROCEDURE — 82607 VITAMIN B-12: CPT | Performed by: FAMILY MEDICINE

## 2024-02-02 PROCEDURE — 36415 COLL VENOUS BLD VENIPUNCTURE: CPT | Performed by: FAMILY MEDICINE

## 2024-02-02 PROCEDURE — 80053 COMPREHEN METABOLIC PANEL: CPT | Performed by: FAMILY MEDICINE

## 2024-02-02 PROCEDURE — 83036 HEMOGLOBIN GLYCOSYLATED A1C: CPT | Performed by: FAMILY MEDICINE

## 2024-02-02 NOTE — PROGRESS NOTES
OFFICE VISIT NOTE     LOAN BRAN DO        PATIENT NAME:Shalini Kunz  PATIENT : 1935  ANNE MARIE: 2024    CC: No chief complaint on file.  er followup        HPI   Shalini Kunz is a 88 y.o. female  With HTN, SVT and pyloric stricture seen for ER followup . She is a [facility] resident living with her .     Overall she is doing fine.    She woke up 10/8/23 to her alarm. Then she felt chest pressure that last a few minutes without pain and some nausea prompting her to call the [place] nurse. She was evaluated in the ER for ACS rule out and fortunately negative.  Seen by her cardiologist and had a stress test January that was fortunately negative.  Cardiology team has been explaining to her that her Eliquis is underdosed and she is not on a therapeutic dose.  She has been hesitant given her GI bleed history.  She has not had recurrent episodes.     Had labs completed this morning that are stable  She returned from a [facility] and had a wonderful time. She had some illness viral unclear if rsv, flu, covid and acute sx resolved. Since then she has felt like she is in a fog. Denies cp, sob, haas, coughing, nearn sy ncope, gib.        Specialist   cardiology dr san  audiology-[place]  podiatry-[place]  Rheum dr penn  [facility]- dr ken waller  Derm dr white  ent dr jose alfredo barba    #afib  #HTN  - dr duque -> dr. san  - eliquis 2.5 po bid, metoprolol 25mg po daily    #hx of PE    #HLD- lipitor 5mg po daily        SOCIAL HISTORY:  [facility]   lives with    nightly wine      FUNCTIONAL HISTORY:  ADL   Feeding-I  Toileting-I  Dressing-I  Bathing-I  Transferring-I    IADL  Medications-I  Shopping-I  Finances-I  Cooking-I  Cleaning-I        ADVANCED CARE PLANNING:  Full           HEALTH MAINTENANCE    -- Pneumonia Immunization:utd   -- Influenza Immunization:utd  -- Shingles Immunization:  -- Colorectal cancer screening:completed  -- Breast Cancer  screening:reviewed goc.  She tells me that she would like to continue mammograms  8/22/23  -- Cervical cancer screening:completed  -- Bone Health screening: Tells me she would like to continue DEXA scans 8/22/23 osteoporosis   -- Vision Screening:dr tse   -- Hearing Screening:  -- RSV vaccination: UTD      The following have been reviewed and updated as appropriate in this visit: active problem list, medication list, allergies, family history, social history, health maintenance            Past Medical History:   Diagnosis Date   • Anemia    • Arthritis     thumbs   • Deep vein thrombosis (CMS/HCC)     2019   • Hypertension    • Lightheadedness    • Lipid disorder    • Liver disease        Past Surgical History:   Procedure Laterality Date   • ABDOMINAL SURGERY      2019   • ADENOIDECTOMY Bilateral    • CHOLECYSTECTOMY  10/03/2011   • EYE SURGERY      2020    • GANGLION CYST EXCISION     • GASTRIC BYPASS     • TONSILLECTOMY       Social History     Socioeconomic History   • Marital status:      Spouse name: Not on file   • Number of children: Not on file   • Years of education: Not on file   • Highest education level: Not on file   Occupational History   • Not on file   Tobacco Use   • Smoking status: Never   • Smokeless tobacco: Never   Substance and Sexual Activity   • Alcohol use: Yes     Alcohol/week: 1.0 standard drink of alcohol     Types: 1 Glasses of wine per week     Comment: half one at dinner    • Drug use: Not on file   • Sexual activity: Not on file   Other Topics Concern   • Not on file   Social History Narrative   • Not on file     Social Determinants of Health     Financial Resource Strain: Not on file   Food Insecurity: No Food Insecurity (10/8/2023)    Hunger Vital Sign    • Worried About Running Out of Food in the Last Year: Never true    • Ran Out of Food in the Last Year: Never true   Transportation Needs: Not on file   Physical Activity: Not on file   Stress: Not on file   Social  Connections: Not on file   Intimate Partner Violence: Not on file   Housing Stability: Not on file         Family History   Problem Relation Age of Onset   • Alzheimer's disease Biological Mother    • Heart failure Biological Father    • No Known Problems Biological Sister          No Known Allergies      Current Outpatient Medications   Medication Sig Dispense Refill   • atorvastatin (LIPITOR) 10 mg tablet TAKE 1/2 TABLET  (5 MG TOTAL) BY MOUTH DAILY. 45 tablet 0   • calcium carbonate-vitamin D3 (CALCIUM 600 WITH VITAMIN D3) 600 mg(1,500mg) -400 unit tablet,chewable Take 600 mg by mouth once.     • cholecalciferol, vitamin D3, 1,000 unit (25 mcg) tablet Take 1 tablet (1,000 Units total) by mouth daily. 90 tablet 3   • cyanocobalamin 1,000 mcg tablet Take 1 tablet (1,000 mcg total) by mouth daily. 90 tablet 3   • ELIQUIS 2.5 mg tablet TAKE 1 TABLET BY MOUTH  TWICE DAILY 180 tablet 3   • fexofenadine 180 mg tablet Take 180 mg by mouth once.     • iron, carbonyl 25 mg iron tablet Take 25 mg by mouth daily.     • metoprolol succinate XL (TOPROL-XL) 25 mg 24 hr tablet TAKE 1 TABLET BY MOUTH DAILY 90 tablet 1   • multivitamin capsule Take 1 capsule by mouth daily.     • omeprazole 40 mg capsule Take 40 mg by mouth daily.     • ondansetron (ZOFRAN) 4 mg tablet Take 1 tablet (4 mg total) by mouth every 8 (eight) hours as needed for nausea or vomiting for up to 7 days. 20 tablet 0   • SUMAtriptan (IMITREX) 50 mg tablet Take 1 tablet (50 mg total) by mouth once as needed for migraine. 30 tablet 3   • triamcinolone 55 mcg nasal inhaler Administer 2 sprays into affected nostril(s) daily as needed.         No current facility-administered medications for this visit.       Review of Systems  ROS  See hpi.   General: Denies fatigue, weight loss or weight gain.    Head: Denies headaches trauma   Eyes: Denies blurry vision, diplopia, decreased vision.  Denies drainage or excessive tearing.  Ears: Denies tinnitis, decreased hearing,  "ear drainage  Nose: Denies rhinorrhea, epistaxi  Mouth: Denies dry mouth  Neck: Denies lumps, bumps.  Denies dysphagia, odynophagia  Pulmonary:  Denies shortness of breath, difficulty breathing, excessive drooling, change in sputum.   Cardiac: Denies chest pain, flip-flop sensation   GI/Abdomen: Denies vomit, nausea, diarrhea, constipation, hematochezia.  Denies abdominal pain.    Uro/: Denies hematuria, urgency, frequency, burning sensation with urination. Denies discharge.   Skin: Denies lumps, bumps, rash.   MSK:  Denies weakness, numbness, tingling.    Neuro: Denies confusion, vertigo.              VITALS  There were no vitals filed for this visit.  See chart 130/70, 98, 64, 95% room air    Wt Readings from Last 3 Encounters:   01/29/24 67.1 kg (148 lb)   11/22/23 67.1 kg (148 lb)   10/08/23 65.8 kg (145 lb)       Ht Readings from Last 3 Encounters:   01/29/24 1.676 m (5' 6\")   11/22/23 1.676 m (5' 6\")   10/08/23 1.676 m (5' 6\")       BP Readings from Last 3 Encounters:   01/29/24 (!) 139/55   11/22/23 138/70   10/08/23 135/63       Physical Exam  PHYSICAL EXAM  General: Appears well, in no distress. Pt is pleasant. Hygiene normal.  Head: NC/AT.   Eyes: Conjunctiva and sclera normal bilaterally.   Neck: Inspection normal. Trachea midline. supple, FROM without pain. No cervical lymphadenopathy.  No enlargement of thyroid.  Cardiac: regular, rate, and rhythm. No murmurs, rubs, or gallops.  Lungs: negative for respiratory distress with normal respiratory effort. Lungs clear to auscultation bilaterally. No wheezes, rales, or rhonchi. No intercostal retractions  Abdomen: +BS. Bowel sounds normal. Abdomen is soft, non-distended. No tenderness. No masses or organomegaly. No guarding.   Skin: warm and dry. No erythema or rash.  Extremities: No peripheral edema or extremity lymphadenopathy  MSK: 5/5 UE and LE b/l. Gait stable and intact. Sitting Balance stable.   Psych: linear. Normal mood and affect.  No SI/HI. AAOX3. " "          PERTINENT LABS, IMAGING    No new labs.  Lab Results   Component Value Date    WBC 5.30 02/02/2024    HGB 14.2 02/02/2024    HCT 43.4 02/02/2024    MCV 93.3 02/02/2024     02/02/2024         Chemistry        Component Value Date/Time     02/02/2024 0835    K 4.1 02/02/2024 0835     02/02/2024 0835    CO2 26 02/02/2024 0835    BUN 18 02/02/2024 0835    CREATININE 0.8 02/02/2024 0835        Component Value Date/Time    CALCIUM 9.1 02/02/2024 0835    ALKPHOS 65 02/02/2024 0835    AST 16 02/02/2024 0835    ALT 13 02/02/2024 0835    BILITOT 0.6 02/02/2024 0835            Lab Results   Component Value Date    CHOL 154 02/02/2024    CHOL 173 12/01/2023    CHOL 170 07/14/2023     Lab Results   Component Value Date    HDL 57 02/02/2024    HDL 61 12/01/2023    HDL 52 07/14/2023     Lab Results   Component Value Date    LDLCALC 58 02/02/2024    LDLCALC 72 12/01/2023    LDLCALC 71 07/14/2023     Lab Results   Component Value Date    TRIG 196 (H) 02/02/2024    TRIG 201 (H) 12/01/2023    TRIG 237 (H) 07/14/2023     No results found for: \"CHOLHDL\"    Lab Results   Component Value Date    TSH 3.52 02/02/2024       Lab Results   Component Value Date    HGBA1C 5.4 02/02/2024       No results found for: \"HAV\", \"HEPAIGM\", \"HEPBIGM\", \"HEPBCAB\", \"HBEAG\", \"HEPCAB\"    No results found for: \"MICROALBUR\", \"[id number]\"    Recent Results (from the past 24 hour(s))   Comprehensive metabolic panel    Collection Time: 02/02/24  8:35 AM   Result Value Ref Range    Sodium 138 136 - 145 mEQ/L    Potassium 4.1 3.5 - 5.1 mEQ/L    Chloride 105 98 - 107 mEQ/L    CO2 26 21 - 31 mEQ/L    BUN 18 7 - 25 mg/dL    Creatinine 0.8 0.6 - 1.2 mg/dL    Glucose 92 70 - 99 mg/dL    Calcium 9.1 8.6 - 10.3 mg/dL    AST (SGOT) 16 13 - 39 IU/L    ALT (SGPT) 13 7 - 52 IU/L    Alkaline Phosphatase 65 34 - 125 IU/L    Total Protein 5.9 (L) 6.0 - 8.2 g/dL    Albumin 4.0 3.5 - 5.7 g/dL    Bilirubin, Total 0.6 0.3 - 1.2 mg/dL    eGFR >60.0 >=60.0 " mL/min/1.73m*2    Anion Gap 7 3 - 15 mEQ/L   Lipid Panel    Collection Time: 02/02/24  8:35 AM   Result Value Ref Range    Triglycerides 196 (H) <150 mg/dL    Cholesterol 154 <=200 mg/dL    HDL 57 >=50 mg/dL    LDL Calculated 58 <=100 mg/dL    Non-HDL, Calculated 97 mg/dL    RISK 2.7 <=5.0   CBC and Differential    Collection Time: 02/02/24  8:35 AM   Result Value Ref Range    WBC 5.30 3.80 - 10.50 K/uL    RBC 4.65 3.93 - 5.22 M/uL    Hemoglobin 14.2 11.8 - 15.7 g/dL    Hematocrit 43.4 35.0 - 45.0 %    MCV 93.3 83.0 - 98.0 fL    MCH 30.5 28.0 - 33.2 pg    MCHC 32.7 32.2 - 35.5 g/dL    RDW 12.3 11.7 - 14.4 %    Platelets 260 150 - 369 K/uL    MPV 11.2 9.4 - 12.3 fL    Differential Type Auto     nRBC 0.0 <=0.0 %    Immature Granulocytes 0.2 %    Neutrophils 51.3 %    Lymphocytes 36.4 %    Monocytes 10.8 %    Eosinophils 0.9 %    Basophils 0.4 %    Immature Granulocytes, Absolute 0.01 0.00 - 0.08 K/uL    Neutrophils, Absolute 2.72 1.70 - 7.00 K/uL    Lymphocytes, Absolute 1.93 1.20 - 3.50 K/uL    Monocytes, Absolute 0.57 0.28 - 0.80 K/uL    Eosinophils, Absolute 0.05 0.04 - 0.36 K/uL    Basophils, Absolute 0.02 0.01 - 0.10 K/uL   Hemoglobin A1c    Collection Time: 02/02/24  8:35 AM   Result Value Ref Range    Hemoglobin A1C 5.4 <5.7 %    Estimated Ave Glucose 108 mg/dL   TSH    Collection Time: 02/02/24  8:35 AM   Result Value Ref Range    TSH 3.52 0.34 - 5.60 mIU/L   T4, free    Collection Time: 02/02/24  8:35 AM   Result Value Ref Range    Free T4 1.32 0.58 - 1.64 ng/dL   Vitamin B12    Collection Time: 02/02/24  8:35 AM   Result Value Ref Range    Vitamin B-12 643 180 - 914 pg/mL   Vitamin D 25 hydroxy    Collection Time: 02/02/24  8:35 AM   Result Value Ref Range    Vit D, 25-Hydroxy 33 30 - 100 ng/mL       NM MYOCARDIAL STRESS REST PHARM    Result Date: 1/29/2024  CLINICAL HISTORY:  R00.2: Palpitations R07.89: Other chest pain COMPARISON: There is no relevant prior study available for comparison. COMMENT:  ADMINISTERED DOSE: *  32millicurie of TC-99M SESTAMIBI UD 32 millicurie *  9millicurie of TC-99M SESTAMIBI UD 9 millicurie. *  0.4 mg Lexiscan (REGADENOSON) IV TECHNIQUE:  The patient was injected with 9 mCi Tc 99m Sestamibi and resting SPECT images of the myocardium were obtained.  The patient was then stressed with intravenous Lexiscan. During pharmacologic stress the patient was reinjected with 32 mCi Tc 99m Sestamibi and gated SPECT images of the myocardium were obtained. Note: A large amount of subdiaphragmatic activity diminishes evaluation of the inferior wall. SPECT images: There is no abnormal fixed or reversible defect. GATED IMAGES: Gated images demonstrate no focal wall motion abnormality.  Left ventricular ejection fraction is calculated at 72% with end diastolic volume of 36 cc. TID: 0.84.     IMPRESSION: No evidence for pharmacologically induced ischemia. LVEF: 72%.    CV/NM MPI pharm stress    Result Date: 1/29/2024  •  Negative lexiscan ECG test. Nuclear images pending and are to be reported by radiology •  Response to Stress: There were no arrhythmias during stress. There were no arrhythmias during recovery.               Immunization History   Administered Date(s) Administered   • Pneumococcal Conjugate 13-Valent 09/11/2017   • Pneumococcal Polysaccharide 04/02/2021   • SARS-COV-2 (COVID19) VACCINE, PFIZER MONOVALENT 02/10/2021, 03/24/2021   • Tdap 10/13/2008   • Zoster 05/26/2009         Health Maintenance   Topic Date Due   • Depression Screening  Never done   • RSV (60+ years old [shared decision making] or in pregnancy during 32 through 36 weeks) (1 - 1-dose 60+ series) Never done   • Falls Risk Screening  Never done   • Zoster Vaccine (2 of 3) 07/21/2009   • DTaP, Tdap, and Td Vaccines (2 - Td or Tdap) 10/13/2018   • Influenza Vaccine (1) Never done   • COVID-19 Vaccine (3 - 2023-24 season) 09/01/2023   • Medicare Annual Wellness Visit  07/28/2024   • DEXA Scan  08/22/2025   • Pneumococcal  (65 years and older)  Completed   • Meningococcal ACWY  Aged Out   • RSV <20 months  Aged Out   • HIB Vaccines  Aged Out   • Hepatitis B Vaccines  Aged Out   • IPV Vaccines  Aged Out   • HPV Vaccines  Aged Out            Diagnosis Plan   1. Memory change        2. Persistent atrial fibrillation (CMS/HCC)        3. Essential hypertension        4. Chronic anticoagulation        5. Other hyperlipidemia            Shalini Kunz is a 88 y.o. female  With HTN, SVT and pyloric stricture seen for routine followup . She is a [facility] resident living with her .       Specialist   cardiology dr san  audiology-[place]  podiatry-[place]  Rheum dr penn  [facility] ret- dr ken wallre  Derm dr zeng  ent dr jose alfredo barba    Memory changes   she feels she is in a fog and touble executing tasks   she came [place] from a [place] trip [place]  Her son who usually is her support system is biking across [place]   adl and iadl-I  minicog normal  NS eval ordered per her request  Labs stabl    afib  HTN  - dr duque -> dr san  caridiology  - eliquis 2.5 po bid ( rec 5 bid dosing but pt reluctant with hx of GIB), metoprolol 25mg po daily    hx of PE    HLD- lipitor 5mg po daily,check level  January 2024 stress test negative    osteoporosis  vitamin D def- dec level 25 taking 1000 IU po daily  Rheum dr penn   reclast     b12 def-  reduce to QOD ,Repeat level    left wet mac deg- injections  right dry mac degen- eye    B[facility]-derm dr white, Right arm    migraine- prn sumatriptan 50mg working well    GOO sp lap gastro-jejunostomy dr Sy July 2019 , PRN omepzole 40mg po daily  pyloric stenosis- dilation in past  sp CCY      MAW 7/28/23   clock draw normal   word recall 3/3  ADLs and IADLs independent  Social alcohol use denies tobacco use  She is driving without issues    Rates health is good.  Exercising regularly takes medications as prescribed denies falls    To do  tdap  shinrix  eliquis dosing w cardiology    42  minute reviewing chronic conditions, educaiton, counseling, reviewing labs  Return in about 3 months (around 5/2/2024), or if symptoms worsen or fail to improve, for Followup with Dr. Renée Pittman.    Renée Pittman, DO

## 2024-02-17 ENCOUNTER — APPOINTMENT (EMERGENCY)
Dept: RADIOLOGY | Facility: HOSPITAL | Age: 89
DRG: 378 | End: 2024-02-17
Attending: STUDENT IN AN ORGANIZED HEALTH CARE EDUCATION/TRAINING PROGRAM
Payer: MEDICARE

## 2024-02-17 ENCOUNTER — HOSPITAL ENCOUNTER (INPATIENT)
Facility: HOSPITAL | Age: 89
LOS: 3 days | Discharge: HOME | DRG: 378 | End: 2024-02-20
Attending: STUDENT IN AN ORGANIZED HEALTH CARE EDUCATION/TRAINING PROGRAM | Admitting: HOSPITALIST
Payer: MEDICARE

## 2024-02-17 DIAGNOSIS — I95.9 HYPOTENSION, UNSPECIFIED HYPOTENSION TYPE: ICD-10-CM

## 2024-02-17 DIAGNOSIS — R11.2 NAUSEA AND VOMITING, UNSPECIFIED VOMITING TYPE: ICD-10-CM

## 2024-02-17 DIAGNOSIS — K92.2 GASTROINTESTINAL HEMORRHAGE, UNSPECIFIED GASTROINTESTINAL HEMORRHAGE TYPE: Primary | ICD-10-CM

## 2024-02-17 DIAGNOSIS — K92.0 COFFEE GROUND EMESIS: ICD-10-CM

## 2024-02-17 DIAGNOSIS — D62 ANEMIA ASSOCIATED WITH ACUTE BLOOD LOSS: ICD-10-CM

## 2024-02-17 DIAGNOSIS — K92.1 MELENA: ICD-10-CM

## 2024-02-17 DIAGNOSIS — K83.8 DILATED BILE DUCT: ICD-10-CM

## 2024-02-17 PROBLEM — I51.89 GRADE I DIASTOLIC DYSFUNCTION: Status: ACTIVE | Noted: 2024-02-17

## 2024-02-17 PROBLEM — Z87.19 HISTORY OF PYLORIC STENOSIS: Status: ACTIVE | Noted: 2024-02-17

## 2024-02-17 PROBLEM — I48.0 PAROXYSMAL ATRIAL FIBRILLATION (CMS/HCC): Status: ACTIVE | Noted: 2018-11-30

## 2024-02-17 PROBLEM — M81.0 AGE-RELATED OSTEOPOROSIS WITHOUT CURRENT PATHOLOGICAL FRACTURE: Status: ACTIVE | Noted: 2024-02-17

## 2024-02-17 LAB
ABO + RH BLD: NORMAL
ABO + RH BLD: NORMAL
ALBUMIN SERPL-MCNC: 3.5 G/DL (ref 3.5–5.7)
ALP SERPL-CCNC: 44 IU/L (ref 34–125)
ALT SERPL-CCNC: 12 IU/L (ref 7–52)
ANION GAP SERPL CALC-SCNC: 5 MEQ/L (ref 3–15)
APTT PPP: 24 SEC (ref 23–35)
AST SERPL-CCNC: 13 IU/L (ref 13–39)
BASOPHILS # BLD: 0.01 K/UL (ref 0.01–0.1)
BASOPHILS NFR BLD: 0.1 %
BILIRUB SERPL-MCNC: 0.4 MG/DL (ref 0.3–1.2)
BLD GP AB SCN SERPL QL: NEGATIVE
BUN SERPL-MCNC: 69 MG/DL (ref 7–25)
C DIFF TOX GENS STL QL NAA+PROBE: NEGATIVE
CALCIUM SERPL-MCNC: 9.3 MG/DL (ref 8.6–10.3)
CHLORIDE SERPL-SCNC: 108 MEQ/L (ref 98–107)
CO2 SERPL-SCNC: 26 MEQ/L (ref 21–31)
CREAT SERPL-MCNC: 0.8 MG/DL (ref 0.6–1.2)
D AG BLD QL: POSITIVE
D AG BLD QL: POSITIVE
DIFFERENTIAL METHOD BLD: ABNORMAL
EGFRCR SERPLBLD CKD-EPI 2021: >60 ML/MIN/1.73M*2
EOSINOPHIL # BLD: 0 K/UL (ref 0.04–0.36)
EOSINOPHIL NFR BLD: 0 %
ERYTHROCYTE [DISTWIDTH] IN BLOOD BY AUTOMATED COUNT: 12.6 % (ref 11.7–14.4)
ERYTHROCYTE [DISTWIDTH] IN BLOOD BY AUTOMATED COUNT: 12.9 % (ref 11.7–14.4)
ERYTHROCYTE [DISTWIDTH] IN BLOOD BY AUTOMATED COUNT: 13 % (ref 11.7–14.4)
FERRITIN SERPL-MCNC: 18 NG/ML (ref 11–250)
GLUCOSE SERPL-MCNC: 112 MG/DL (ref 70–99)
HCT VFR BLD AUTO: 31.8 % (ref 35–45)
HCT VFR BLD AUTO: 32.7 % (ref 35–45)
HCT VFR BLD AUTO: 32.8 % (ref 35–45)
HGB BLD-MCNC: 10 G/DL (ref 11.8–15.7)
HGB BLD-MCNC: 10.3 G/DL (ref 11.8–15.7)
HGB BLD-MCNC: 10.5 G/DL (ref 11.8–15.7)
IMM GRANULOCYTES # BLD AUTO: 0.03 K/UL (ref 0–0.08)
IMM GRANULOCYTES NFR BLD AUTO: 0.3 %
INR PPP: 1.2
IRON SATN MFR SERPL: 89 % (ref 15–45)
IRON SERPL-MCNC: 233 UG/DL (ref 35–150)
LABORATORY COMMENT REPORT: NORMAL
LACTATE SERPL-SCNC: 1.4 MMOL/L (ref 0.4–2)
LACTATE SERPL-SCNC: 1.5 MMOL/L (ref 0.4–2)
LACTATE SERPL-SCNC: 2.3 MMOL/L (ref 0.4–2)
LACTATE SERPL-SCNC: 2.6 MMOL/L (ref 0.4–2)
LIPASE SERPL-CCNC: 32 U/L (ref 11–82)
LYMPHOCYTES # BLD: 0.87 K/UL (ref 1.2–3.5)
LYMPHOCYTES NFR BLD: 9.6 %
MCH RBC QN AUTO: 30.4 PG (ref 28–33.2)
MCH RBC QN AUTO: 30.6 PG (ref 28–33.2)
MCH RBC QN AUTO: 30.7 PG (ref 28–33.2)
MCHC RBC AUTO-ENTMCNC: 31.4 G/DL (ref 32.2–35.5)
MCHC RBC AUTO-ENTMCNC: 31.5 G/DL (ref 32.2–35.5)
MCHC RBC AUTO-ENTMCNC: 32 G/DL (ref 32.2–35.5)
MCV RBC AUTO: 95.9 FL (ref 83–98)
MCV RBC AUTO: 96.7 FL (ref 83–98)
MCV RBC AUTO: 97 FL (ref 83–98)
MONOCYTES # BLD: 0.56 K/UL (ref 0.28–0.8)
MONOCYTES NFR BLD: 6.2 %
NEUTROPHILS # BLD: 7.63 K/UL (ref 1.7–7)
NEUTS SEG NFR BLD: 83.8 %
NRBC BLD-RTO: 0 %
PDW BLD AUTO: 10.5 FL (ref 9.4–12.3)
PDW BLD AUTO: 10.5 FL (ref 9.4–12.3)
PDW BLD AUTO: 10.6 FL (ref 9.4–12.3)
PLATELET # BLD AUTO: 131 K/UL (ref 150–369)
PLATELET # BLD AUTO: 144 K/UL (ref 150–369)
PLATELET # BLD AUTO: 200 K/UL (ref 150–369)
POTASSIUM SERPL-SCNC: 5 MEQ/L (ref 3.5–5.1)
PROT SERPL-MCNC: 5.5 G/DL (ref 6–8.2)
PROTHROMBIN TIME: 15.3 SEC (ref 12.2–14.5)
RBC # BLD AUTO: 3.29 M/UL (ref 3.93–5.22)
RBC # BLD AUTO: 3.37 M/UL (ref 3.93–5.22)
RBC # BLD AUTO: 3.42 M/UL (ref 3.93–5.22)
SODIUM SERPL-SCNC: 139 MEQ/L (ref 136–145)
SPECIMEN EXP DATE BLD: NORMAL
TIBC SERPL-MCNC: 262 UG/DL (ref 270–460)
TROPONIN I SERPL HS-MCNC: 7.7 PG/ML
TROPONIN I SERPL HS-MCNC: 8.2 PG/ML
UIBC SERPL-MCNC: 29 UG/DL (ref 180–360)
WBC # BLD AUTO: 7.55 K/UL (ref 3.8–10.5)
WBC # BLD AUTO: 8.81 K/UL (ref 3.8–10.5)
WBC # BLD AUTO: 9.1 K/UL (ref 3.8–10.5)

## 2024-02-17 PROCEDURE — 99223 1ST HOSP IP/OBS HIGH 75: CPT | Performed by: HOSPITALIST

## 2024-02-17 PROCEDURE — 83605 ASSAY OF LACTIC ACID: CPT

## 2024-02-17 PROCEDURE — 36415 COLL VENOUS BLD VENIPUNCTURE: CPT | Performed by: STUDENT IN AN ORGANIZED HEALTH CARE EDUCATION/TRAINING PROGRAM

## 2024-02-17 PROCEDURE — 99285 EMERGENCY DEPT VISIT HI MDM: CPT | Mod: 25

## 2024-02-17 PROCEDURE — 87493 C DIFF AMPLIFIED PROBE: CPT | Performed by: STUDENT IN AN ORGANIZED HEALTH CARE EDUCATION/TRAINING PROGRAM

## 2024-02-17 PROCEDURE — 1123F ACP DISCUSS/DSCN MKR DOCD: CPT | Performed by: HOSPITALIST

## 2024-02-17 PROCEDURE — 25800000 HC PHARMACY IV SOLUTIONS

## 2024-02-17 PROCEDURE — 85027 COMPLETE CBC AUTOMATED: CPT

## 2024-02-17 PROCEDURE — 85025 COMPLETE CBC W/AUTO DIFF WBC: CPT | Performed by: STUDENT IN AN ORGANIZED HEALTH CARE EDUCATION/TRAINING PROGRAM

## 2024-02-17 PROCEDURE — 74177 CT ABD & PELVIS W/CONTRAST: CPT | Mod: MG

## 2024-02-17 PROCEDURE — 83690 ASSAY OF LIPASE: CPT | Performed by: STUDENT IN AN ORGANIZED HEALTH CARE EDUCATION/TRAINING PROGRAM

## 2024-02-17 PROCEDURE — 63700000 HC SELF-ADMINISTRABLE DRUG

## 2024-02-17 PROCEDURE — 21400000 HC ROOM AND CARE CCU/INTERMEDIATE

## 2024-02-17 PROCEDURE — 63600000 HC DRUGS/DETAIL CODE: Mod: JZ

## 2024-02-17 PROCEDURE — 84484 ASSAY OF TROPONIN QUANT: CPT | Mod: 91 | Performed by: STUDENT IN AN ORGANIZED HEALTH CARE EDUCATION/TRAINING PROGRAM

## 2024-02-17 PROCEDURE — 86923 COMPATIBILITY TEST ELECTRIC: CPT

## 2024-02-17 PROCEDURE — 85610 PROTHROMBIN TIME: CPT | Performed by: STUDENT IN AN ORGANIZED HEALTH CARE EDUCATION/TRAINING PROGRAM

## 2024-02-17 PROCEDURE — 82728 ASSAY OF FERRITIN: CPT

## 2024-02-17 PROCEDURE — 93005 ELECTROCARDIOGRAM TRACING: CPT | Performed by: STUDENT IN AN ORGANIZED HEALTH CARE EDUCATION/TRAINING PROGRAM

## 2024-02-17 PROCEDURE — 86900 BLOOD TYPING SEROLOGIC ABO: CPT

## 2024-02-17 PROCEDURE — 96375 TX/PRO/DX INJ NEW DRUG ADDON: CPT | Mod: 59

## 2024-02-17 PROCEDURE — 84484 ASSAY OF TROPONIN QUANT: CPT | Performed by: STUDENT IN AN ORGANIZED HEALTH CARE EDUCATION/TRAINING PROGRAM

## 2024-02-17 PROCEDURE — 25000000 HC PHARMACY GENERAL

## 2024-02-17 PROCEDURE — 71045 X-RAY EXAM CHEST 1 VIEW: CPT

## 2024-02-17 PROCEDURE — 96361 HYDRATE IV INFUSION ADD-ON: CPT

## 2024-02-17 PROCEDURE — 96374 THER/PROPH/DIAG INJ IV PUSH: CPT | Mod: 59

## 2024-02-17 PROCEDURE — P9016 RBC LEUKOCYTES REDUCED: HCPCS

## 2024-02-17 PROCEDURE — 80053 COMPREHEN METABOLIC PANEL: CPT | Performed by: STUDENT IN AN ORGANIZED HEALTH CARE EDUCATION/TRAINING PROGRAM

## 2024-02-17 PROCEDURE — 83550 IRON BINDING TEST: CPT

## 2024-02-17 PROCEDURE — 63700000 HC SELF-ADMINISTRABLE DRUG: Performed by: HOSPITALIST

## 2024-02-17 PROCEDURE — 83605 ASSAY OF LACTIC ACID: CPT | Performed by: HOSPITALIST

## 2024-02-17 PROCEDURE — 63600000 HC DRUGS/DETAIL CODE: Mod: JZ | Performed by: STUDENT IN AN ORGANIZED HEALTH CARE EDUCATION/TRAINING PROGRAM

## 2024-02-17 PROCEDURE — 87045 FECES CULTURE AEROBIC BACT: CPT | Performed by: STUDENT IN AN ORGANIZED HEALTH CARE EDUCATION/TRAINING PROGRAM

## 2024-02-17 PROCEDURE — 25800000 HC PHARMACY IV SOLUTIONS: Performed by: STUDENT IN AN ORGANIZED HEALTH CARE EDUCATION/TRAINING PROGRAM

## 2024-02-17 PROCEDURE — 85730 THROMBOPLASTIN TIME PARTIAL: CPT | Performed by: STUDENT IN AN ORGANIZED HEALTH CARE EDUCATION/TRAINING PROGRAM

## 2024-02-17 PROCEDURE — 63600105 HC IODINE BASED CONTRAST: Mod: JZ | Performed by: STUDENT IN AN ORGANIZED HEALTH CARE EDUCATION/TRAINING PROGRAM

## 2024-02-17 RX ORDER — ONDANSETRON 4 MG/1
4 TABLET, ORALLY DISINTEGRATING ORAL EVERY 8 HOURS PRN
Status: DISCONTINUED | OUTPATIENT
Start: 2024-02-17 | End: 2024-02-20 | Stop reason: HOSPADM

## 2024-02-17 RX ORDER — CHOLECALCIFEROL (VITAMIN D3) 25 MCG
1000 TABLET ORAL DAILY
Status: DISCONTINUED | OUTPATIENT
Start: 2024-02-17 | End: 2024-02-20 | Stop reason: HOSPADM

## 2024-02-17 RX ORDER — LANOLIN ALCOHOL/MO/W.PET/CERES
1000 CREAM (GRAM) TOPICAL DAILY
Status: DISCONTINUED | OUTPATIENT
Start: 2024-02-17 | End: 2024-02-20 | Stop reason: HOSPADM

## 2024-02-17 RX ORDER — SODIUM CHLORIDE, SODIUM LACTATE, POTASSIUM CHLORIDE, CALCIUM CHLORIDE 600; 310; 30; 20 MG/100ML; MG/100ML; MG/100ML; MG/100ML
INJECTION, SOLUTION INTRAVENOUS ONCE
Status: COMPLETED | OUTPATIENT
Start: 2024-02-17 | End: 2024-02-17

## 2024-02-17 RX ORDER — SODIUM CHLORIDE 9 MG/ML
5 INJECTION, SOLUTION INTRAVENOUS AS NEEDED
Status: DISCONTINUED | OUTPATIENT
Start: 2024-02-17 | End: 2024-02-17

## 2024-02-17 RX ORDER — IOPAMIDOL 755 MG/ML
100 INJECTION, SOLUTION INTRAVASCULAR
Status: COMPLETED | OUTPATIENT
Start: 2024-02-17 | End: 2024-02-17

## 2024-02-17 RX ORDER — DEXTROSE 50 % IN WATER (D50W) INTRAVENOUS SYRINGE
25 AS NEEDED
Status: DISCONTINUED | OUTPATIENT
Start: 2024-02-17 | End: 2024-02-20 | Stop reason: HOSPADM

## 2024-02-17 RX ORDER — PSYLLIUM HUSK 0.4 G
1 CAPSULE ORAL DAILY
Status: DISCONTINUED | OUTPATIENT
Start: 2024-02-17 | End: 2024-02-20 | Stop reason: HOSPADM

## 2024-02-17 RX ORDER — ONDANSETRON HYDROCHLORIDE 2 MG/ML
4 INJECTION, SOLUTION INTRAVENOUS ONCE
Status: COMPLETED | OUTPATIENT
Start: 2024-02-17 | End: 2024-02-17

## 2024-02-17 RX ORDER — ACETAMINOPHEN 325 MG/1
650 TABLET ORAL EVERY 4 HOURS PRN
Status: DISCONTINUED | OUTPATIENT
Start: 2024-02-17 | End: 2024-02-20 | Stop reason: HOSPADM

## 2024-02-17 RX ORDER — SODIUM CHLORIDE, SODIUM LACTATE, POTASSIUM CHLORIDE, CALCIUM CHLORIDE 600; 310; 30; 20 MG/100ML; MG/100ML; MG/100ML; MG/100ML
INJECTION, SOLUTION INTRAVENOUS CONTINUOUS
Status: DISCONTINUED | OUTPATIENT
Start: 2024-02-17 | End: 2024-02-18

## 2024-02-17 RX ORDER — IBUPROFEN 200 MG
16-32 TABLET ORAL AS NEEDED
Status: DISCONTINUED | OUTPATIENT
Start: 2024-02-17 | End: 2024-02-20 | Stop reason: HOSPADM

## 2024-02-17 RX ORDER — DEXTROSE 40 %
15-30 GEL (GRAM) ORAL AS NEEDED
Status: DISCONTINUED | OUTPATIENT
Start: 2024-02-17 | End: 2024-02-20 | Stop reason: HOSPADM

## 2024-02-17 RX ORDER — METOPROLOL SUCCINATE 25 MG/1
25 TABLET, EXTENDED RELEASE ORAL DAILY
Status: DISCONTINUED | OUTPATIENT
Start: 2024-02-17 | End: 2024-02-20 | Stop reason: HOSPADM

## 2024-02-17 RX ORDER — ATORVASTATIN CALCIUM 10 MG/1
10 TABLET, FILM COATED ORAL
Status: DISCONTINUED | OUTPATIENT
Start: 2024-02-17 | End: 2024-02-20 | Stop reason: HOSPADM

## 2024-02-17 RX ADMIN — SODIUM CHLORIDE 1000 ML: 9 INJECTION, SOLUTION INTRAVENOUS at 08:00

## 2024-02-17 RX ADMIN — SODIUM CHLORIDE 8 MG/HR: 9 INJECTION, SOLUTION INTRAVENOUS at 22:42

## 2024-02-17 RX ADMIN — SODIUM CHLORIDE 500 ML: 9 INJECTION, SOLUTION INTRAVENOUS at 10:05

## 2024-02-17 RX ADMIN — SODIUM CHLORIDE, POTASSIUM CHLORIDE, SODIUM LACTATE AND CALCIUM CHLORIDE: 600; 310; 30; 20 INJECTION, SOLUTION INTRAVENOUS at 14:01

## 2024-02-17 RX ADMIN — ONDANSETRON HYDROCHLORIDE 4 MG: 2 INJECTION, SOLUTION INTRAMUSCULAR; INTRAVENOUS at 08:03

## 2024-02-17 RX ADMIN — SODIUM CHLORIDE, POTASSIUM CHLORIDE, SODIUM LACTATE AND CALCIUM CHLORIDE: 600; 310; 30; 20 INJECTION, SOLUTION INTRAVENOUS at 21:02

## 2024-02-17 RX ADMIN — ATORVASTATIN CALCIUM 10 MG: 10 TABLET, FILM COATED ORAL at 17:21

## 2024-02-17 RX ADMIN — SODIUM CHLORIDE, POTASSIUM CHLORIDE, SODIUM LACTATE AND CALCIUM CHLORIDE 500 ML: 600; 310; 30; 20 INJECTION, SOLUTION INTRAVENOUS at 21:01

## 2024-02-17 RX ADMIN — Medication 1 TABLET: at 16:01

## 2024-02-17 RX ADMIN — Medication 1000 UNITS: at 16:01

## 2024-02-17 RX ADMIN — CYANOCOBALAMIN TAB 1000 MCG 1000 MCG: 1000 TAB at 16:01

## 2024-02-17 RX ADMIN — IOPAMIDOL 100 ML: 755 INJECTION, SOLUTION INTRAVENOUS at 09:39

## 2024-02-17 RX ADMIN — SODIUM CHLORIDE 8 MG/HR: 9 INJECTION, SOLUTION INTRAVENOUS at 08:40

## 2024-02-17 RX ADMIN — ONDANSETRON 4 MG: 4 TABLET, ORALLY DISINTEGRATING ORAL at 21:05

## 2024-02-17 ASSESSMENT — ENCOUNTER SYMPTOMS
VOMITING: 1
ABDOMINAL PAIN: 1
FEVER: 0
SHORTNESS OF BREATH: 0
LIGHT-HEADEDNESS: 1
DIARRHEA: 1
NAUSEA: 1

## 2024-02-17 ASSESSMENT — COGNITIVE AND FUNCTIONAL STATUS - GENERAL
STANDING UP FROM CHAIR USING ARMS: 4 - NONE
CLIMB 3 TO 5 STEPS WITH RAILING: 3 - A LITTLE
MOVING TO AND FROM BED TO CHAIR: 4 - NONE
WALKING IN HOSPITAL ROOM: 4 - NONE

## 2024-02-17 NOTE — H&P
Hospital Medicine Service -  History & Physical        CHIEF COMPLAINT   Nausea/Vomiting/Diarrhea     HISTORY OF PRESENT ILLNESS      Shalini Kunz is a 88 y.o. female with a past medical history of HTN, pAF on Eliquis, HLD, anemia, osteoporosis, hx of pyloric stenosis (s/p balloon dilation x3)  who presents with persistent N/V/D.    Patient states that over the last day or so she has had persistent coffee-ground emesis and black color diarrhea as well.  Overnight she had about 3 episodes of this.  This was associated with abdominal discomfort.  2 days ago she took Imodium and Pepto-Bismol as well.    She denies any headaches fevers, SOB, chest pain.  Denies significant abdominal pain.  She denies any falls, she did feel a little lightheaded over the past day or so.    She does endorse a recent travel history of flying to [place], and then going on a 16-day cruise to [place] about a month ago.  A few weeks ago she also had COVID infection as well.  She tells me that she has prior history of GI bleeds, and is currently on Eliquis for paroxysmal atrial fibrillation.    Allergies: None.  She confirms full CODE STATUS at this time.    In the ER  Vitals: Afebrile, heart rate 101, RR 17, BP 75/56, satting well on room air  CBC shows hemoglobin 10.5 (was 14 on 2/2/24), WBC 9  Lipase, troponins are negative  Lactate 1.4, repeat was 2.3  CMP shows BUN 69  She received 1 unit packed red blood cells in the ER  She received 1.5 L fluid bolus    CT abdomen pelvis was nonrevealing, showing moderate bile duct dilation in the setting of cholecystectomy      PAST MEDICAL AND SURGICAL HISTORY      Past Medical History:   Diagnosis Date   • Anemia    • Arthritis     thumbs   • Deep vein thrombosis (CMS/HCC)     2019   • Hypertension    • Lightheadedness    • Lipid disorder    • Liver disease        Past Surgical History:   Procedure Laterality Date   • ABDOMINAL SURGERY      2019   • ADENOIDECTOMY Bilateral    • CHOLECYSTECTOMY   10/03/2011   • EYE SURGERY      2020    • GANGLION CYST EXCISION     • GASTRIC BYPASS     • TONSILLECTOMY         PCP: Renée Pittman DO    MEDICATIONS      Prior to Admission medications    Medication Sig Start Date End Date Taking? Authorizing Provider   atorvastatin (LIPITOR) 10 mg tablet TAKE 1/2 TABLET  (5 MG TOTAL) BY MOUTH DAILY. 1/17/24  Yes Renée Pittman DO   calcium carbonate-vitamin D3 (CALCIUM 600 WITH VITAMIN D3) 600 mg(1,500mg) -400 unit tablet,chewable Take 600 mg by mouth once.   Yes Dixon Harrington MD   cholecalciferol, vitamin D3, 1,000 unit (25 mcg) tablet Take 1 tablet (1,000 Units total) by mouth daily. 12/10/21  Yes Renée Pittman DO   cyanocobalamin 1,000 mcg tablet Take 1 tablet (1,000 mcg total) by mouth daily. 12/10/21  Yes Renée Pittman DO   ELIQUIS 2.5 mg tablet TAKE 1 TABLET BY MOUTH  TWICE DAILY 5/30/23  Yes Adrian Jo MD   metoprolol succinate XL (TOPROL-XL) 25 mg 24 hr tablet TAKE 1 TABLET BY MOUTH DAILY 1/22/24  Yes Eleni Storm MD   multivitamin capsule Take 1 capsule by mouth daily.   Yes Dixon Harrington MD   omeprazole 40 mg capsule Take 40 mg by mouth daily. 12/8/21  Yes Dixon Harrington MD   SUMAtriptan (IMITREX) 50 mg tablet Take 1 tablet (50 mg total) by mouth once as needed for migraine. 10/10/23  Yes Renée Pittman DO   fexofenadine 180 mg tablet Take 180 mg by mouth once.  2/17/24  Dixon Harrington MD   iron, carbonyl 25 mg iron tablet Take 25 mg by mouth daily.  2/17/24  Dixon Harrington MD   ondansetron (ZOFRAN) 4 mg tablet Take 1 tablet (4 mg total) by mouth every 8 (eight) hours as needed for nausea or vomiting for up to 7 days. 1/17/24 2/17/24  Renée Pittman DO   triamcinolone 55 mcg nasal inhaler Administer 2 sprays into affected nostril(s) daily as needed.    2/17/24  Dixon Harrington MD       ALLERGIES      Patient has no known allergies.    FAMILY HISTORY      Family History   Problem Relation Age of Onset   •  Alzheimer's disease Biological Mother    • Heart failure Biological Father    • No Known Problems Biological Sister        SOCIAL HISTORY      Social History     Socioeconomic History   • Marital status:      Spouse name: None   • Number of children: None   • Years of education: None   • Highest education level: None   Tobacco Use   • Smoking status: Never   • Smokeless tobacco: Never   Substance and Sexual Activity   • Alcohol use: Yes     Alcohol/week: 1.0 standard drink of alcohol     Types: 1 Glasses of wine per week     Comment: half one at dinner      Social Determinants of Health     Food Insecurity: No Food Insecurity (2/17/2024)    Hunger Vital Sign    • Worried About Running Out of Food in the Last Year: Never true    • Ran Out of Food in the Last Year: Never true       REVIEW OF SYSTEMS      All other systems reviewed and negative except as noted in HPI    PHYSICAL EXAMINATION      Temp:  [36.8 °C (98.2 °F)-37.2 °C (99 °F)] 36.8 °C (98.3 °F)  Heart Rate:  [] 106  Resp:  [14-18] 18  BP: ()/(41-78) 115/55  Body mass index is 23.52 kg/m².    Physical Exam  Constitutional:       General: She is not in acute distress.     Appearance: Normal appearance. She is normal weight. She is not ill-appearing, toxic-appearing or diaphoretic.   HENT:      Head: Normocephalic and atraumatic.      Right Ear: External ear normal.      Left Ear: External ear normal.      Nose: Nose normal. No rhinorrhea.      Mouth/Throat:      Mouth: Mucous membranes are moist.      Pharynx: Oropharynx is clear.   Eyes:      General: No scleral icterus.        Right eye: No discharge.         Left eye: No discharge.   Cardiovascular:      Rate and Rhythm: Normal rate and regular rhythm.      Pulses: Normal pulses.      Heart sounds: No murmur heard.     No friction rub. No gallop.   Pulmonary:      Effort: Pulmonary effort is normal. No respiratory distress.      Breath sounds: Normal breath sounds. No stridor. No  wheezing, rhonchi or rales.   Chest:      Chest wall: No tenderness.   Abdominal:      General: Abdomen is flat. Bowel sounds are normal. There is no distension.      Palpations: Abdomen is soft. There is no mass.      Tenderness: There is abdominal tenderness in the epigastric area. There is no guarding or rebound.      Hernia: No hernia is present.   Musculoskeletal:         General: No swelling, deformity or signs of injury.      Right lower leg: No edema.      Left lower leg: No edema.   Skin:     General: Skin is warm and dry.   Neurological:      General: No focal deficit present.      Mental Status: She is alert and oriented to person, place, and time. Mental status is at baseline.   Psychiatric:         Mood and Affect: Mood normal.         Behavior: Behavior normal.         Thought Content: Thought content normal.         Judgment: Judgment normal.         LABS / IMAGING / EKG        Labs  I have reviewed the patient's pertinent labs.  Significant abnormals are in the A+P.    Imaging  I have independently reviewed the pertinent imaging from the last 24 hrs.    ECG/Telemetry  I have independently reviewed the ECG. No significant findings.    ASSESSMENT AND PLAN           * Gastrointestinal hemorrhage, unspecified gastrointestinal hemorrhage type  Assessment & Plan  Patient presenting with abrupt onset of vomiting and diarrhea.  Her blood pressure was in the 75/56 on admission, with HR of 101.  Patient tells me that both her emesis and diarrhea were black in nature.  She is on Eliquis 2.5 p.o. twice daily.  She has history of prior GI bleeding as well.  On 2/2/2024 her hemoglobin was 14, her hemoglobin today on admission was 10.5.    Her lactate, troponins, and lipase were all negative in the ER.  She is s/p 1 unit packed red blood cell transfusion and 1.5 L normal saline fluid bolus.     Her CT A/P on 2/17/24: showed moderate bile duct dilation (ISO of her cholecystectomy), otherwise unrevealing.      Plan:  - Placed on pantoprazole (Protonix) 8 mg/hr continuous IV infusion  - Checking C. difficile stool antigen, ova and parasite screen  - Check serial CBC every 6 hours, and hold Eliquis 2.5 mg p.o. twice daily  - Zofran 4 mg IV as needed  - Consulted gastroenterology for recommendations  - N.p.o. for now, continue LR maintenance fluids    Grade I diastolic dysfunction  Assessment & Plan  3/7/2023.  Patient had transthoracic echocardiogram showing:  Left Ventricle: Normal ventricle size. Normal wall thickness. Estimated EF 55%. No regional wall motion abnormalities. Grade I diastolic dysfunction.    Follow with Dr. Ron Guillaume as her cardiologist. She had a recent myocardial perfusion SPECT in Jan 2024 which was negative.      History of pyloric stenosis  Assessment & Plan  6/4/2019: status post EGD with finding of benign pyloric stricture status post dilation and biopsy  6/19/2019: Status post EGD with persistent pyloric stenosis status post repeat dilation with steroid injection  7/11/2019 status post EGD with refractory benign pyloric stricture with gastric outlet obstruction status post repeat balloon dilation    These endoscopies were done by Dr Vlad Anderson MD at [facility]. She has been stable since then, as far as I'm aware.    Age-related osteoporosis without current pathological fracture  Assessment & Plan  Reviewed patient's DEXA scan from August 2023.  Lumbar spine T-score -2.8, in line with diagnosis of osteoporosis.  She is on calcium and vitamin D supplementation as outpatient.    Plan:  - Follow-up with PCP for consideration of bisphosphonate therapy    Paroxysmal atrial fibrillation (CMS/HCC)  Assessment & Plan  Patient has history of paroxysmal atrial fibrillation.  She is in normal sinus rhythm, and takes Eliquis 2.5 mg p.o. twice daily she was also on metoprolol XL 25 mg p.o. daily which was held due to low blood pressures.    Plan:  - Hold Eliquis and metoprolol at this time        Code Status: Full Code  Estimated Discharge Date: 2/19/2024  Disposition Planning: pending medical managment     Kimmy Liang DO  2/17/2024

## 2024-02-17 NOTE — ASSESSMENT & PLAN NOTE
Reviewed patient's DEXA scan from August 2023.  Lumbar spine T-score -2.8, in line with diagnosis of osteoporosis.  She is on calcium and vitamin D supplementation as outpatient.    Plan:  - Follow-up with PCP for consideration of bisphosphonate therapy

## 2024-02-17 NOTE — ED ATTESTATION NOTE
"I have personally seen and examined Shalini Kunz.  I was involved in the care, management and medical decision making for this patient.  I reviewed and agree with physician assistant (PA-C) assessment and plan of care; we discussed the case and the treatment plan. Any exceptions are documented below.    Exam:  Patient Vitals for the past 72 hrs:   BP Temp Pulse Resp SpO2 Height Weight   02/17/24 0805 (!) 83/50 -- 94 17 97 % -- --   02/17/24 0803 -- -- -- -- 96 % -- --   02/17/24 0758 -- 36.8 °C (98.2 °F) -- -- -- -- --   02/17/24 0754 (!) 75/56 -- (!) 101 17 -- 1.676 m (5' 6\") 65.3 kg (144 lb)     VS reviewed, NAD, nontoxic, head at/nc, normal speech, dry mm, no resp distress, normal skin tone, coordinated movement. Generalized abdominal discomfort. LEXY hemoccult positive.      David Rhodes DO  02/17/24 0834    "

## 2024-02-17 NOTE — PLAN OF CARE
Plan of Care Review  Progress: no change  Outcome Evaluation: VS as charted on RA. AAOx4. Remains NPO except sips. LR running at 100ml/hr. Protonix drip continues at 10 ml/hr per orders. CBC q6 hrs continues. Denies nausea, emesis, pain. No further bloody bowel movements or signs of active bleeding. Ambulates to bathroom, Ax1.

## 2024-02-17 NOTE — ASSESSMENT & PLAN NOTE
Patient presenting with abrupt onset of vomiting and diarrhea.  Her blood pressure was in the 75/56 on admission, with HR of 101.  Patient reports that both her emesis and diarrhea were black in nature. She is on Eliquis 2.5 p.o. twice daily. She has history of prior GI bleeding as well. On 2/2/2024 her hemoglobin was 14, her hemoglobin on admission was 10.5.  Lactate, troponins, and lipase were all negative in the ER (lactate spiked to 2.6 but corrected)  S/p 1 unit packed red blood cell transfusion and 1.5 L normal saline fluid bolus in ED    Her CT A/P on 2/17/24: showed moderate bile duct dilation (ISO of her cholecystectomy), otherwise unrevealing.   EGD on 2/19 negative for acute bleed, Gibson complete 2/20 -> negative for bleed. OK to resume AC and reg diet and cleared for DC today per GI with PPI daily indefinitely. It is possible her bleeding lesion resolved.    Plan:  - PO protonix qd  - C. difficile stool antigen (negative), ova and parasite screen (negative)  - CBC BID, and Eliquis 2.5 mg p.o. twice daily  - Zofran 4 mg IV as needed  - Consulted gastroenterology for recommendations

## 2024-02-17 NOTE — ASSESSMENT & PLAN NOTE
6/4/2019: status post EGD with finding of benign pyloric stricture status post dilation and biopsy  6/19/2019: Status post EGD with persistent pyloric stenosis status post repeat dilation with steroid injection  7/11/2019 status post EGD with refractory benign pyloric stricture with gastric outlet obstruction status post repeat balloon dilation    These endoscopies were done by Dr Vlad Anderson MD at [facility]. She has been stable since then, as far as I'm aware.

## 2024-02-17 NOTE — ASSESSMENT & PLAN NOTE
Patient has history of paroxysmal atrial fibrillation.  She is in normal sinus rhythm, and takes Eliquis 2.5 mg p.o. twice daily she was also on metoprolol XL 25 mg p.o. daily which was held due to low blood pressures.    Plan:  - Eliquis resumed as above  - Hold metoprolol at this time

## 2024-02-17 NOTE — ASSESSMENT & PLAN NOTE
Chronic  3/7/2023.  Patient had transthoracic echocardiogram showing:  Left Ventricle: Normal ventricle size. Normal wall thickness. Estimated EF 55%. No regional wall motion abnormalities. Grade I diastolic dysfunction.    Follow with Dr. Ron Guillaume as her cardiologist. She had a recent myocardial perfusion SPECT in Jan 2024 which was negative.

## 2024-02-18 PROBLEM — R93.89 ABNORMAL COMPUTED TOMOGRAPHY SCAN: Status: ACTIVE | Noted: 2024-02-18

## 2024-02-18 LAB
ALBUMIN SERPL-MCNC: 2.6 G/DL (ref 3.5–5.7)
ALP SERPL-CCNC: 32 IU/L (ref 34–125)
ALT SERPL-CCNC: 7 IU/L (ref 7–52)
ANION GAP SERPL CALC-SCNC: 5 MEQ/L (ref 3–15)
AST SERPL-CCNC: 11 IU/L (ref 13–39)
ATRIAL RATE: 96
BILIRUB DIRECT SERPL-MCNC: 0.1 MG/DL
BILIRUB SERPL-MCNC: 0.5 MG/DL (ref 0.3–1.2)
BUN SERPL-MCNC: 32 MG/DL (ref 7–25)
CALCIUM SERPL-MCNC: 7.6 MG/DL (ref 8.6–10.3)
CHLORIDE SERPL-SCNC: 113 MEQ/L (ref 98–107)
CO2 SERPL-SCNC: 24 MEQ/L (ref 21–31)
CREAT SERPL-MCNC: 0.6 MG/DL (ref 0.6–1.2)
EGFRCR SERPLBLD CKD-EPI 2021: >60 ML/MIN/1.73M*2
ERYTHROCYTE [DISTWIDTH] IN BLOOD BY AUTOMATED COUNT: 13.2 % (ref 11.7–14.4)
GLUCOSE SERPL-MCNC: 81 MG/DL (ref 70–99)
HCT VFR BLD AUTO: 26.9 % (ref 35–45)
HCT VFR BLD AUTO: 27.2 % (ref 35–45)
HCT VFR BLD AUTO: 29.1 % (ref 35–45)
HGB BLD-MCNC: 8.7 G/DL (ref 11.8–15.7)
HGB BLD-MCNC: 8.7 G/DL (ref 11.8–15.7)
HGB BLD-MCNC: 9.2 G/DL (ref 11.8–15.7)
MAGNESIUM SERPL-MCNC: 1.7 MG/DL (ref 1.8–2.5)
MCH RBC QN AUTO: 30.6 PG (ref 28–33.2)
MCH RBC QN AUTO: 30.7 PG (ref 28–33.2)
MCH RBC QN AUTO: 31.1 PG (ref 28–33.2)
MCHC RBC AUTO-ENTMCNC: 31.6 G/DL (ref 32.2–35.5)
MCHC RBC AUTO-ENTMCNC: 32 G/DL (ref 32.2–35.5)
MCHC RBC AUTO-ENTMCNC: 32.3 G/DL (ref 32.2–35.5)
MCV RBC AUTO: 95.8 FL (ref 83–98)
MCV RBC AUTO: 96.1 FL (ref 83–98)
MCV RBC AUTO: 97 FL (ref 83–98)
PDW BLD AUTO: 10.3 FL (ref 9.4–12.3)
PDW BLD AUTO: 10.5 FL (ref 9.4–12.3)
PDW BLD AUTO: 10.6 FL (ref 9.4–12.3)
PHOSPHATE SERPL-MCNC: 2.3 MG/DL (ref 2.4–4.7)
PLATELET # BLD AUTO: 114 K/UL (ref 150–369)
PLATELET # BLD AUTO: 121 K/UL (ref 150–369)
PLATELET # BLD AUTO: 126 K/UL (ref 150–369)
POTASSIUM SERPL-SCNC: 4.1 MEQ/L (ref 3.5–5.1)
PR INTERVAL: 158
PROT SERPL-MCNC: 3.8 G/DL (ref 6–8.2)
QRS DURATION: 68
QT INTERVAL: 334
QTC CALCULATION(BAZETT): 421
R AXIS: 58
RBC # BLD AUTO: 2.8 M/UL (ref 3.93–5.22)
RBC # BLD AUTO: 2.84 M/UL (ref 3.93–5.22)
RBC # BLD AUTO: 3 M/UL (ref 3.93–5.22)
SODIUM SERPL-SCNC: 142 MEQ/L (ref 136–145)
T WAVE AXIS: 46
VENTRICULAR RATE: 96
WBC # BLD AUTO: 5 K/UL (ref 3.8–10.5)
WBC # BLD AUTO: 6.11 K/UL (ref 3.8–10.5)
WBC # BLD AUTO: 6.69 K/UL (ref 3.8–10.5)

## 2024-02-18 PROCEDURE — 25800000 HC PHARMACY IV SOLUTIONS

## 2024-02-18 PROCEDURE — 36415 COLL VENOUS BLD VENIPUNCTURE: CPT

## 2024-02-18 PROCEDURE — 83735 ASSAY OF MAGNESIUM: CPT | Performed by: HOSPITALIST

## 2024-02-18 PROCEDURE — 63700000 HC SELF-ADMINISTRABLE DRUG

## 2024-02-18 PROCEDURE — 80048 BASIC METABOLIC PNL TOTAL CA: CPT | Performed by: HOSPITALIST

## 2024-02-18 PROCEDURE — 85027 COMPLETE CBC AUTOMATED: CPT

## 2024-02-18 PROCEDURE — 85027 COMPLETE CBC AUTOMATED: CPT | Performed by: STUDENT IN AN ORGANIZED HEALTH CARE EDUCATION/TRAINING PROGRAM

## 2024-02-18 PROCEDURE — 21400000 HC ROOM AND CARE CCU/INTERMEDIATE

## 2024-02-18 PROCEDURE — 99233 SBSQ HOSP IP/OBS HIGH 50: CPT | Performed by: HOSPITALIST

## 2024-02-18 PROCEDURE — 84100 ASSAY OF PHOSPHORUS: CPT | Performed by: HOSPITALIST

## 2024-02-18 PROCEDURE — 93010 ELECTROCARDIOGRAM REPORT: CPT | Performed by: INTERNAL MEDICINE

## 2024-02-18 PROCEDURE — 80076 HEPATIC FUNCTION PANEL: CPT | Performed by: HOSPITALIST

## 2024-02-18 PROCEDURE — 25000000 HC PHARMACY GENERAL

## 2024-02-18 PROCEDURE — 63700000 HC SELF-ADMINISTRABLE DRUG: Performed by: HOSPITALIST

## 2024-02-18 PROCEDURE — 63600000 HC DRUGS/DETAIL CODE: Mod: JZ

## 2024-02-18 RX ORDER — SODIUM,POTASSIUM PHOSPHATES 280-250MG
1 POWDER IN PACKET (EA) ORAL ONCE
Status: COMPLETED | OUTPATIENT
Start: 2024-02-18 | End: 2024-02-18

## 2024-02-18 RX ADMIN — MAGNESIUM SULFATE HEPTAHYDRATE 2 G: 40 INJECTION, SOLUTION INTRAVENOUS at 10:27

## 2024-02-18 RX ADMIN — POTASSIUM & SODIUM PHOSPHATES POWDER PACK 280-160-250 MG 1 PACKET: 280-160-250 PACK at 09:03

## 2024-02-18 RX ADMIN — Medication 1000 UNITS: at 09:03

## 2024-02-18 RX ADMIN — MULTIPLE VITAMINS W/ MINERALS TAB 1 TABLET: TAB at 09:02

## 2024-02-18 RX ADMIN — ONDANSETRON 4 MG: 4 TABLET, ORALLY DISINTEGRATING ORAL at 18:42

## 2024-02-18 RX ADMIN — Medication 1 TABLET: at 09:03

## 2024-02-18 RX ADMIN — ATORVASTATIN CALCIUM 10 MG: 10 TABLET, FILM COATED ORAL at 18:06

## 2024-02-18 RX ADMIN — CYANOCOBALAMIN TAB 1000 MCG 1000 MCG: 1000 TAB at 09:03

## 2024-02-18 RX ADMIN — SODIUM CHLORIDE 8 MG/HR: 9 INJECTION, SOLUTION INTRAVENOUS at 23:49

## 2024-02-18 ASSESSMENT — COGNITIVE AND FUNCTIONAL STATUS - GENERAL
CLIMB 3 TO 5 STEPS WITH RAILING: 3 - A LITTLE
MOVING TO AND FROM BED TO CHAIR: 4 - NONE
WALKING IN HOSPITAL ROOM: 4 - NONE
MOVING TO AND FROM BED TO CHAIR: 4 - NONE
STANDING UP FROM CHAIR USING ARMS: 4 - NONE
STANDING UP FROM CHAIR USING ARMS: 4 - NONE
WALKING IN HOSPITAL ROOM: 4 - NONE
CLIMB 3 TO 5 STEPS WITH RAILING: 3 - A LITTLE

## 2024-02-18 NOTE — PLAN OF CARE
Plan of Care Review  Plan of Care Reviewed With: patient  Progress: no change  Outcome Evaluation: AAOx4. Diet advanced to clear liquids, no red liquids. NPO at midnight for EGD tomorrow. PPI gtt continues per orders. No episodes of melena or emesis today. Eliquis continues to be held. SCDs on BLLE. Mg and phosphate repleted.

## 2024-02-18 NOTE — ASSESSMENT & PLAN NOTE
Incidental Moderate Biliary Duct Dilation and Mild PD Dilation  Seen on CT Abd/pelvis with moderate intrahepatic and extrahepatic bile duct dilation with CBD measuring 1.2 cm and PD up to 0.5 cm without any discrete lesions or masses. Possibly in setting of prior CCY along with history of prior surgery (gastrojejunal anastomosis). Not on chronic opioids. LFTs wnl along with T Bili.    Plan per GI:   - Obtain MRI/MRCP WWO contrast as an outpatient given both biliary and PD dilation, but suspect secondary to surgery

## 2024-02-18 NOTE — PROGRESS NOTES
Internal Medicine  Daily Progress Note       SUBJECTIVE   This is a 88 y.o. year-old female admitted on 2/17/2024 with Dilated bile duct [K83.8]  Hypotension, unspecified hypotension type [I95.9]  Gastrointestinal hemorrhage, unspecified gastrointestinal hemorrhage type [K92.2]  Nausea and vomiting, unspecified vomiting type [R11.2].    Interval History: No acute concerns for active bleed this AM. Patient free of complaints at this time, denies nausea or emesis.      OBJECTIVE   Vital signs in last 24 hours:  Temp:  [36.4 °C (97.5 °F)-37.1 °C (98.7 °F)] 36.5 °C (97.7 °F)  Heart Rate:  [] 84  Resp:  [16-18] 18  BP: (101-135)/(49-61) 101/49  SpO2:  [92 %-100 %] 100 %  Oxygen Therapy: None (Room air)    Weight & I/Os:  Weights (last 5 days)     Date/Time Weight Drug Calculation Weight (Dosing Weight)    02/17/24 1630 66.3 kg (146 lb 3.2 oz) --    02/17/24 1229 66.1 kg (145 lb 11.6 oz) 66.1 kg (145 lb 11.6 oz)    02/17/24 0754 65.3 kg (144 lb) --          Intake/Output Summary (Last 24 hours) at 2/18/2024 0659  Last data filed at 2/17/2024 1200  24 Hour Net Input/Output from 7AM Yesterday   Intake 325 ml   Output --   Net 325 ml        PHYSICAL EXAMINATION   Physical Exam  Constitutional:       Appearance: Normal appearance.   HENT:      Head: Normocephalic and atraumatic.   Cardiovascular:      Rate and Rhythm: Normal rate and regular rhythm.      Pulses: Normal pulses.      Heart sounds: Normal heart sounds.   Pulmonary:      Effort: Pulmonary effort is normal.      Breath sounds: Normal breath sounds.   Abdominal:      Tenderness: There is abdominal tenderness.      Comments: Epigastric tenderness   Skin:     General: Skin is warm and dry.   Neurological:      General: No focal deficit present.      Mental Status: She is alert and oriented to person, place, and time.          LINES, CATHETERS, DRAINS, AIRWAYS, & WOUNDS   Lines, drains, airways, & wounds:  Peripheral IV (Adult) 02/17/24 Left Antecubital  (Active)   Number of days: 1       Peripheral IV (Adult) 02/17/24 Right Hand (Active)   Number of days: 1        LABS, IMAGING, & TELE   Labs:  Reviewed    Results from last 7 days   Lab Units 02/18/24  0611 02/18/24  0102 02/17/24  1736   WBC K/uL 5.00 6.11 8.81   HEMOGLOBIN g/dL 8.7* 8.7* 10.3*   HEMATOCRIT % 27.2* 26.9* 32.7*   PLATELETS K/uL 114* 121* 144*       Results from last 7 days   Lab Units 02/18/24  0611 02/17/24  0759   SODIUM mEQ/L 142 139   POTASSIUM mEQ/L 4.1 5.0   CHLORIDE mEQ/L 113* 108*   CO2 mEQ/L 24 26   BUN mg/dL 32* 69*   CREATININE mg/dL 0.6 0.8   CALCIUM mg/dL 7.6* 9.3   ALBUMIN g/dL 2.6* 3.5   BILIRUBIN TOTAL mg/dL 0.5 0.4   ALK PHOS IU/L 32* 44   ALT IU/L 7 12   AST IU/L 11* 13   GLUCOSE mg/dL 81 112*     Imaging personally reviewed (does not include unread studies):  CT ABDOMEN PELVIS WITH IV CONTRAST    Result Date: 2/17/2024  CLINICAL HISTORY: Abdominal abscess/infection suspected Additional history from chart: 88 y.o. female with past medical history of hypertension, DVT on Eliquis, hyperlipidemia, anemia presenting to emergency department for evaluation of vomiting and diarrhea.  Patient reports last night developing lightheadedness, vomiting, and diarrhea with generalized abdominal pain.  Patient reports the vomit and stool were black.  Patient was brought in via EMS from home.  Patient denies chest pain, shortness of breath. COMPARISON: Chest x-ray 2/17/2024 COMMENT: TECHNIQUE: CT of the abdomen and pelvis was performed after the uneventful administration of intravenous contrast with the patient in the supine position. Images reconstructed/reformatted in the axial, coronal and  sagittal planes. CT DOSE:  One or more dose reduction techniques (e.g. automated exposure control, adjustment of the mA and/or kV according to patient size, use of iterative reconstruction technique) utilized for this examination. ADMINISTERED CONTRAST: 100mL of iopamidoL (ISOVUE-370) 370 mg iodine /mL (76  %) injection 100 mL LOWER CHEST: Right middle lobe solid pulmonary nodule measuring 0.4 cm. Right posterior fat-containing Bochdalek hernia. Right middle lobe linear subsegmental volume loss. Vascular calcifications of the partially imaged descending thoracic aorta. LIVER: Chronic medial segment left hepatic lobe atrophy. BILE DUCTS: Moderate intrahepatic and extra hepatic bile duct dilation. Common hepatic duct measures 1.3 cm. Common bile duct measures 1.2 cm and tapers to 0.8 cm just upstream from the ampulla of Vater. GALLBLADDER: Removed PANCREAS: Pancreatic duct caliber measures up to 0.5 cm at the level of the pancreatic head on coronal image 41. SPLEEN: Within normal limits. ADRENALS: Within normal limits. KIDNEYS/URETERS/BLADDER: Subcentimeter low-attenuation lesions within the kidneys are too small to characterize. Kidneys enhance excrete contrast symmetrically. No hydroureteronephrosis. Urinary bladder is normal. REPRODUCTIVE ORGANS: Anteverted uterus. Reflux of contrast into dilated left ovarian vein. BOWEL: Colonic diverticulosis without evidence of acute diverticulitis. Terminal ileum and retrocecal appendix are normal. Gastrojejunal anastomosis at the proximal gastric body greater curvature Normal bowel caliber. No ascites or free air, no fluid collection VESSELS: Vascular calcifications. Normal caliber abdominal aorta. LYMPH NODES: within normal limits. ABDOMINAL WALL: Within normal limits. BONES: Diffuse osseous demineralization. Degenerative changes spanning the symphysis pubis. Mild degenerative changes of the sacroiliac joints. Multilevel degenerative disc disease. Grade 1 anterolisthesis of L4 on L5. T9 pagetoid changes. Mild degenerative changes of the hips. Bilateral hip labral chondrocalcinosis. Mild extra convex curvature of the lumbar spine.     IMPRESSION: 1.  Moderate bile duct dilation and mild pancreatic duct dilation, of indeterminate acuity given absence of prior imaging. MRI/MRCP  without and with intravenous contrast can be considered if further imaging evaluation is desired. 2.  Additional chronic findings as delineated above. Actionable Finding: Follow up should be considered.    X-RAY CHEST 1 VIEW    Result Date: 2/17/2024  CLINICAL HISTORY: Chest Pain/Arrhythmia     IMPRESSION:No evidence of acute disease COMMENT:Frontal portable erect view of the chest was performed with the patient rotated to the left and compared with the October 8, 2023 examination. Increased conspicuity of the perihilar middle to lower lung zone interstitial which does not meet criteria for pulmonary vascular distribution. No evidence of airspace disease. No evidence of a pleural effusion, pneumothorax or pneumomediastinum. Atheromatous and atherosclerotic changes of the imaged portions of the thoracic aorta.    Telemetry/EKG:  Reviewed.     ASSESSMENT & PLAN   * Gastrointestinal hemorrhage, unspecified gastrointestinal hemorrhage type  Assessment & Plan  Patient presenting with abrupt onset of vomiting and diarrhea.  Her blood pressure was in the 75/56 on admission, with HR of 101.  Patient reports that both her emesis and diarrhea were black in nature. She is on Eliquis 2.5 p.o. twice daily. She has history of prior GI bleeding as well. On 2/2/2024 her hemoglobin was 14, her hemoglobin on admission was 10.5.  Lactate, troponins, and lipase were all negative in the ER (lactate spiked to 2.6 but corrected)  S/p 1 unit packed red blood cell transfusion and 1.5 L normal saline fluid bolus in ED    Her CT A/P on 2/17/24: showed moderate bile duct dilation (ISO of her cholecystectomy), otherwise unrevealing.     Plan:  - Placed on pantoprazole (Protonix) 8 mg/hr continuous IV infusion  - Checking C. difficile stool antigen (negative), ova and parasite screen  - CBC q8h, and hold Eliquis 2.5 mg p.o. twice daily  - Zofran 4 mg IV as needed  - Consulted gastroenterology for recommendations  - CLD, IVF dc/ed  - NPO [place]  for EGD on 2/19  - Awaiting cardiology clearance for EGD    Paroxysmal atrial fibrillation (CMS/HCC)  Assessment & Plan  Patient has history of paroxysmal atrial fibrillation.  She is in normal sinus rhythm, and takes Eliquis 2.5 mg p.o. twice daily she was also on metoprolol XL 25 mg p.o. daily which was held due to low blood pressures.    Plan:  - Hold Eliquis and metoprolol at this time    Abnormal computed tomography scan  Assessment & Plan  Incidental Moderate Biliary Duct Dilation and Mild PD Dilation  Seen on CT Abd/pelvis with moderate intrahepatic and extrahepatic bile duct dilation with CBD measuring 1.2 cm and PD up to 0.5 cm without any discrete lesions or masses. Possibly in setting of prior CCY along with history of prior surgery (gastrojejunal anastomosis). Not on chronic opioids. LFTs wnl along with T Bili.    Plan per GI:   - Obtain MRI/MRCP WWO contrast as an outpatient given both biliary and PD dilation, but suspect secondary to surgery       Grade I diastolic dysfunction  Assessment & Plan  3/7/2023.  Patient had transthoracic echocardiogram showing:  Left Ventricle: Normal ventricle size. Normal wall thickness. Estimated EF 55%. No regional wall motion abnormalities. Grade I diastolic dysfunction.    Follow with Dr. Ron Guillaume as her cardiologist. She had a recent myocardial perfusion SPECT in Jan 2024 which was negative.      History of pyloric stenosis  Assessment & Plan  6/4/2019: status post EGD with finding of benign pyloric stricture status post dilation and biopsy  6/19/2019: Status post EGD with persistent pyloric stenosis status post repeat dilation with steroid injection  7/11/2019 status post EGD with refractory benign pyloric stricture with gastric outlet obstruction status post repeat balloon dilation    These endoscopies were done by Dr Vlad Anderson MD at [facility]. She has been stable since then, as far as I'm aware.    Age-related osteoporosis without current  pathological fracture  Assessment & Plan  Reviewed patient's DEXA scan from August 2023.  Lumbar spine T-score -2.8, in line with diagnosis of osteoporosis.  She is on calcium and vitamin D supplementation as outpatient.    Plan:  - Follow-up with PCP for consideration of bisphosphonate therapy       VTE Assessment: Padua    VTE Prophylaxis: Current anticoagulants:  [Provider Managed Hold] apixaban (ELIQUIS) tablet 2.5 mg, oral, BID      Code Status: Full Code  Estimated discharge date: 2/19/2024       ATTENDING DOCUMENTATION  ALSO SEE ATTENDING ATTESTATION SECTION OF NOTE

## 2024-02-18 NOTE — CONSULTS
Gastroenterology  Consultation Note       REASON FOR CONSULT   GI Bleed    Consult requested by: Dr. Sigala   HISTORY OF PRESENT ILLNESS      Ms Kunz is a 88 y.o female with pmh of HTN, HLD, pAF (on Eliquis), hx of pyloric stenosis (s/p serial TTS balloon dilations several years ago 2019 with eventual surgery ?) who presented to the ED with coffee ground emesis and black colored stools. Found to have melena on exam with trace dark blood. Gastroenterology has been consulted for further evaluation and management.    Patient states she was otherwise in her USOH until Friday evening when she developed episodes of black-tarry loose stool and coffee ground emesis. Denies any previous or similar symptoms in the past. Denies any blood in her stools or vomit. No reported NSAIDs or significant EtOH use, although does report occasional wine at dinner. States she had three-to-four episodes of dark black vomit without any blood although states she didn't see all of it as her  was helping her clean up. Denies any abdominal pain or preceding nausea/vomiting prior to this. She is on low dose eliquis for her A Fib with last dose on Friday, 2/16. No other antiplatelets or aspirin. Given her symptoms, her  called EMS from home and brought to the ED for further evaluation.    In regards to her GI history, she denies any previous history of GI bleeding in the past. Reports a prior history of pyloric stenosis where she received previous upper endoscopies in [place] (via [employer]) with her last EGD as detailed below. States she eventual underwent surgery for correction a few years ago but unable to recall any details. States her symptoms resolved after her surgery and did quite well. Reports her last EGD was in 2019 and her colonoscopy was around that same time about 4-5 years ago.    Last EGD (pyloric stenosis, continued symptoms of GOO, nausea) 7/2019- Impression:  - Refractory benign pyloric stricture with evidence  of gastric outlet obstruction, s/p repeat balloon dilation (s/p TTS balloon up to 15 mm)  Rec'd: Consider surgical referral vs off-label temporary placement of covered metal stent (e.g. TTS esophageal or LAMS)    In the ED, patient was afebrile, with HR 90-100s and BP 90 - 100s / 50s. Labs notable for BUN 69 and Crt 0.8. LFTs wnl. Lactate 1.5. Iron studies with iron 233, ferritin 18, and iron sat 89%. CBC with Hgb 10.5 -> 10s (previously 13-14s back on 2/2024) and INR 1.2. CT Abd/pelvis w/ IV moderate bile duct dilatation and mild PD dilation of indeterminate acuity and gastrojejunal anastomosis at the proximal gastric body greater curvature but otherwise wnl.    Patient received IVF, IV Zofran, and IV PPI bolus followed by IV PPI gtt and was admitted to medicine for further management. Ordered 1 uPRBC on 2/17 with repeat Hgb -> 8.7.    PAST MEDICAL AND SURGICAL HISTORY      PMHx:  Past Medical History:   Diagnosis Date   • Anemia    • Arthritis     thumbs   • Deep vein thrombosis (CMS/HCC)     2019   • Hypertension    • Lightheadedness    • Lipid disorder    • Liver disease        PSHx:  Past Surgical History:   Procedure Laterality Date   • ABDOMINAL SURGERY      2019   • ADENOIDECTOMY Bilateral    • CHOLECYSTECTOMY  10/03/2011   • EYE SURGERY      2020    • GANGLION CYST EXCISION     • GASTRIC BYPASS     • TONSILLECTOMY         PCP:   Renée Pittman, DO    MEDICATIONS      Prior to Admission medications    Medication Sig Start Date End Date Taking? Authorizing Provider   atorvastatin (LIPITOR) 10 mg tablet TAKE 1/2 TABLET  (5 MG TOTAL) BY MOUTH DAILY. 1/17/24  Yes Renée Pittman DO   calcium carbonate-vitamin D3 (CALCIUM 600 WITH VITAMIN D3) 600 mg(1,500mg) -400 unit tablet,chewable Take 600 mg by mouth once.   Yes Provider, MD Dixon   cholecalciferol, vitamin D3, 1,000 unit (25 mcg) tablet Take 1 tablet (1,000 Units total) by mouth daily. 12/10/21  Yes Renée Pittman DO   cyanocobalamin 1,000 mcg  tablet Take 1 tablet (1,000 mcg total) by mouth daily. 12/10/21  Yes Renée Pittman DO   ELIQUIS 2.5 mg tablet TAKE 1 TABLET BY MOUTH  TWICE DAILY 5/30/23  Yes Adrian Jo MD   metoprolol succinate XL (TOPROL-XL) 25 mg 24 hr tablet TAKE 1 TABLET BY MOUTH DAILY 1/22/24  Yes Eleni Storm MD   multivitamin capsule Take 1 capsule by mouth daily.   Yes ProviderDixon MD   omeprazole 40 mg capsule Take 40 mg by mouth daily. 12/8/21  Yes Dixon Harrington MD   SUMAtriptan (IMITREX) 50 mg tablet Take 1 tablet (50 mg total) by mouth once as needed for migraine. 10/10/23  Yes Renée Pittman DO       Home medications were personally reviewed.    ALLERGIES      Patient has no known allergies.    FAMILY HISTORY      Family History   Problem Relation Age of Onset   • Alzheimer's disease Biological Mother    • Heart failure Biological Father    • No Known Problems Biological Sister        SOCIAL HISTORY      Social History     Socioeconomic History   • Marital status:      Spouse name: None   • Number of children: None   • Years of education: None   • Highest education level: None   Tobacco Use   • Smoking status: Never   • Smokeless tobacco: Never   Substance and Sexual Activity   • Alcohol use: Yes     Alcohol/week: 1.0 standard drink of alcohol     Types: 1 Glasses of wine per week     Comment: half one at dinner      Social Determinants of Health     Food Insecurity: No Food Insecurity (2/17/2024)    Hunger Vital Sign    • Worried About Running Out of Food in the Last Year: Never true    • Ran Out of Food in the Last Year: Never true       REVIEW OF SYSTEMS      Review of Systems  12 point ROS was otherwise negative except for those stated above per HPI    PHYSICAL EXAMINATION      Temp:  [36.5 °C (97.7 °F)-37.2 °C (99 °F)] 36.5 °C (97.7 °F)  Heart Rate:  [] 80  Resp:  [14-18] 16  BP: ()/(41-78) 107/53  Body mass index is 23.6 kg/m².    Physical Exam  General: Comfortable appearing,  elderly female, in no acute distress; non-toxic appearing  Eyes: No scleral icterus; pale conjunctivae  HENT: No oropharyngeal lesions; MMM  Cardiac: RR on tele; no JVD  Lungs: Normal work of breathing and effort on room air  Abdomen: Soft, NTND; no tenderness on palpation; no guarding or rebound tenderness  Rectal: External skin tag, melena on exam without any palpable masses or blood  Extremities: Warm and well-perfused  Skin: No jaundice   Neuro: Grossly non-focal; moves all four extremities spontaneously  Psych: Normal mood and affect; pleasant and cooperative    LABS / IMAGING / EKG        Labs  Results from last 7 days   Lab Units 02/17/24  0759   SODIUM mEQ/L 139   POTASSIUM mEQ/L 5.0   CHLORIDE mEQ/L 108*   CO2 mEQ/L 26   BUN mg/dL 69*   CREATININE mg/dL 0.8   CALCIUM mg/dL 9.3   ALBUMIN g/dL 3.5   BILIRUBIN TOTAL mg/dL 0.4   ALK PHOS IU/L 44   ALT IU/L 12   AST IU/L 13   GLUCOSE mg/dL 112*     Results from last 7 days   Lab Units 02/17/24  0759   INR  1.2           Results from last 7 days   Lab Units 02/18/24  0611 02/18/24  0102 02/17/24  1736   WBC K/uL 5.00 6.11 8.81   HEMOGLOBIN g/dL 8.7* 8.7* 10.3*   HEMATOCRIT % 27.2* 26.9* 32.7*   PLATELETS K/uL 114* 121* 144*         Imaging  CT ABDOMEN PELVIS WITH IV CONTRAST   Final Result   IMPRESSION:   1.  Moderate bile duct dilation and mild pancreatic duct dilation, of   indeterminate acuity given absence of prior imaging. MRI/MRCP without and with   intravenous contrast can be considered if further imaging evaluation is desired.   2.  Additional chronic findings as delineated above.         Actionable Finding: Follow up should be considered.      X-RAY CHEST 1 VIEW   Final Result   IMPRESSION:No evidence of acute disease      COMMENT:Frontal portable erect view of the chest was performed with the patient   rotated to the left and compared with the October 8, 2023 examination. Increased   conspicuity of the perihilar middle to lower lung zone interstitial  which does   not meet criteria for pulmonary vascular distribution. No evidence of airspace   disease. No evidence of a pleural effusion, pneumothorax or pneumomediastinum.   Atheromatous and atherosclerotic changes of the imaged portions of the thoracic   aorta.      ECG 12 lead   ED Interpretation   ECG 08:04 - nsr 96 bpm, nl axis, good rwp, no st/t wave changes, qt/qtc 334/421 ms.                   ASSESSMENT AND PLAN      Ms Kunz is a 88 y.o female with pmh of HTN, HLD, pAF (on Eliquis), hx of pyloric stenosis (s/p serial TTS balloon dilations several years ago 2019 with eventual surgery ?) who presented to the ED with coffee ground emesis and black colored stools. Found to have melena on exam with trace dark blood. Gastroenterology has been consulted for further evaluation and management.      #Melena #Coffee Ground Emesis #UGIB  #Acute Blood Loss Anemia  #Hx of Pyloric Stenosis #S/p Gastrojejunal Anastomosis  #A Fib (on eliquis)  Impression: Patient presenting with multiple episodes of coffee ground emesis and melena from home found to have BUN:Crt ratio > 2:1 (BUN 69s and Crt 0.8) and Hgb 10s (from baseline 13-14s) concerning for UGIB. On low dose eliquis, but no other antiplatelets or NSAIDs. Etiology concerning for marginal / anastomotic ulcer (given history of gastrojejunal anastomosis) vs PUD vs gastroduodenal erosions. No personal liver disease or evidence of synthetic dysfunction. No other concern for LGIB. Would benefit from an EGD for further evaluation.  Recommendations:  - Okay for CLD, keep NPO at [place]  - Trend Hgb with CBC q 8 hrs, transfuse for goal Hgb > 8.0 prior to endoscopy  - Continue IV PPI gtt for now  - No concern for infectious diarrhea as this is all secondary to melena. C Diff PCR (-), stool culture pending  - Anemia w/u (-) for ILDEFONSO, all secondary to acute blood loss  - Continue to hold eliquis (2/16 - )  - Plan for EGD tomorrow, 2/19/2024, for further evaluation of melena and coffee  ground emesis  - Notify GI if recurrent melena or hematemesis if EGD were to be considered sooner  - Avoidance of all NSAIDs  - Rest of care per primary team      #Abnormal CT Imaging  #Incidental Moderate Biliary Duct Dilation and #Mild PD Dilation  Seen on CT Abd/pelvis with moderate intrahepatic and extrahepatic bile duct dilation with CBD measuring 1.2 cm and PD up to 0.5 cm without any discrete lesions or masses. Possibly in setting of prior CCY along with history of prior surgery (gastrojejunal anastomosis). Not on chronic opioids. LFTs wnl along with T Bili.  Recs:  - Would obtain MRI/MRCP WWO contrast as an outpatient given both biliary and PD dilation, but suspect secondary to surgery      Patient seen and examined. Plan discussed with Gastroenterology attending, Dr. Green.      Eimr Rhoades DO  Gastroenterology Fellow, PGY-VI  Pager [phone]           VTE Assessment: Padua    Code Status: Full Code  Estimated discharge date: 2/19/2024

## 2024-02-19 ENCOUNTER — ANESTHESIA EVENT (INPATIENT)
Dept: ENDOSCOPY | Facility: HOSPITAL | Age: 89
DRG: 378 | End: 2024-02-19
Payer: MEDICARE

## 2024-02-19 ENCOUNTER — ANESTHESIA (INPATIENT)
Dept: ENDOSCOPY | Facility: HOSPITAL | Age: 89
DRG: 378 | End: 2024-02-19
Payer: MEDICARE

## 2024-02-19 PROBLEM — K92.1 MELENA: Status: ACTIVE | Noted: 2024-02-17

## 2024-02-19 PROBLEM — K92.0 COFFEE GROUND EMESIS: Status: ACTIVE | Noted: 2024-02-17

## 2024-02-19 PROBLEM — Z01.810 PREOPERATIVE CARDIOVASCULAR EXAMINATION: Status: ACTIVE | Noted: 2024-02-19

## 2024-02-19 PROBLEM — E83.42 HYPOMAGNESEMIA: Status: ACTIVE | Noted: 2024-02-19

## 2024-02-19 PROBLEM — D62 ANEMIA ASSOCIATED WITH ACUTE BLOOD LOSS: Status: ACTIVE | Noted: 2024-02-17

## 2024-02-19 LAB
ANION GAP SERPL CALC-SCNC: 5 MEQ/L (ref 3–15)
BACTERIA STL CULT: NORMAL
BUN SERPL-MCNC: 13 MG/DL (ref 7–25)
CALCIUM SERPL-MCNC: 7.7 MG/DL (ref 8.6–10.3)
CHLORIDE SERPL-SCNC: 112 MEQ/L (ref 98–107)
CO2 SERPL-SCNC: 23 MEQ/L (ref 21–31)
CREAT SERPL-MCNC: 0.5 MG/DL (ref 0.6–1.2)
CROSSMATCH: NORMAL
EGFRCR SERPLBLD CKD-EPI 2021: >60 ML/MIN/1.73M*2
ERYTHROCYTE [DISTWIDTH] IN BLOOD BY AUTOMATED COUNT: 13.1 % (ref 11.7–14.4)
ERYTHROCYTE [DISTWIDTH] IN BLOOD BY AUTOMATED COUNT: 13.1 % (ref 11.7–14.4)
GLUCOSE SERPL-MCNC: 94 MG/DL (ref 70–99)
HCT VFR BLD AUTO: 26.4 % (ref 35–45)
HCT VFR BLD AUTO: 28.3 % (ref 35–45)
HGB BLD-MCNC: 8.4 G/DL (ref 11.8–15.7)
HGB BLD-MCNC: 9 G/DL (ref 11.8–15.7)
ISBT CODE: 6200
MAGNESIUM SERPL-MCNC: 2.1 MG/DL (ref 1.8–2.5)
MCH RBC QN AUTO: 31.1 PG (ref 28–33.2)
MCH RBC QN AUTO: 31.3 PG (ref 28–33.2)
MCHC RBC AUTO-ENTMCNC: 31.8 G/DL (ref 32.2–35.5)
MCHC RBC AUTO-ENTMCNC: 31.8 G/DL (ref 32.2–35.5)
MCV RBC AUTO: 97.8 FL (ref 83–98)
MCV RBC AUTO: 98.3 FL (ref 83–98)
PDW BLD AUTO: 10.1 FL (ref 9.4–12.3)
PDW BLD AUTO: 10.1 FL (ref 9.4–12.3)
PLATELET # BLD AUTO: 115 K/UL (ref 150–369)
PLATELET # BLD AUTO: 126 K/UL (ref 150–369)
POTASSIUM SERPL-SCNC: 4.2 MEQ/L (ref 3.5–5.1)
PRODUCT CODE: NORMAL
PRODUCT STATUS: NORMAL
RBC # BLD AUTO: 2.7 M/UL (ref 3.93–5.22)
RBC # BLD AUTO: 2.88 M/UL (ref 3.93–5.22)
SODIUM SERPL-SCNC: 140 MEQ/L (ref 136–145)
SPECIMEN EXP DATE BLD: NORMAL
UNIT ABO: NORMAL
UNIT ID: NORMAL
UNIT RH: POSITIVE
WBC # BLD AUTO: 3.93 K/UL (ref 3.8–10.5)
WBC # BLD AUTO: 5.18 K/UL (ref 3.8–10.5)

## 2024-02-19 PROCEDURE — 71000001 HC PACU PHASE 1 INITIAL 30MIN: Performed by: INTERNAL MEDICINE

## 2024-02-19 PROCEDURE — 63700000 HC SELF-ADMINISTRABLE DRUG: Performed by: PHYSICIAN ASSISTANT

## 2024-02-19 PROCEDURE — 71000011 HC PACU PHASE 1 EA ADDL MIN: Performed by: INTERNAL MEDICINE

## 2024-02-19 PROCEDURE — 63700000 HC SELF-ADMINISTRABLE DRUG: Performed by: FAMILY MEDICINE

## 2024-02-19 PROCEDURE — 37000002 HC ANESTHESIA MAC: Performed by: INTERNAL MEDICINE

## 2024-02-19 PROCEDURE — 0DJ08ZZ INSPECTION OF UPPER INTESTINAL TRACT, VIA NATURAL OR ARTIFICIAL OPENING ENDOSCOPIC: ICD-10-PCS | Performed by: INTERNAL MEDICINE

## 2024-02-19 PROCEDURE — 0DB68ZX EXCISION OF STOMACH, VIA NATURAL OR ARTIFICIAL OPENING ENDOSCOPIC, DIAGNOSTIC: ICD-10-PCS | Performed by: INTERNAL MEDICINE

## 2024-02-19 PROCEDURE — 36415 COLL VENOUS BLD VENIPUNCTURE: CPT | Performed by: STUDENT IN AN ORGANIZED HEALTH CARE EDUCATION/TRAINING PROGRAM

## 2024-02-19 PROCEDURE — 21400000 HC ROOM AND CARE CCU/INTERMEDIATE

## 2024-02-19 PROCEDURE — 63700000 HC SELF-ADMINISTRABLE DRUG

## 2024-02-19 PROCEDURE — 88305 TISSUE EXAM BY PATHOLOGIST: CPT | Performed by: INTERNAL MEDICINE

## 2024-02-19 PROCEDURE — 75000060 HC EGD BIOPSY: Performed by: INTERNAL MEDICINE

## 2024-02-19 PROCEDURE — 80048 BASIC METABOLIC PNL TOTAL CA: CPT

## 2024-02-19 PROCEDURE — 85027 COMPLETE CBC AUTOMATED: CPT | Performed by: STUDENT IN AN ORGANIZED HEALTH CARE EDUCATION/TRAINING PROGRAM

## 2024-02-19 PROCEDURE — 63700000 HC SELF-ADMINISTRABLE DRUG: Performed by: HOSPITALIST

## 2024-02-19 PROCEDURE — 25800000 HC PHARMACY IV SOLUTIONS: Performed by: STUDENT IN AN ORGANIZED HEALTH CARE EDUCATION/TRAINING PROGRAM

## 2024-02-19 PROCEDURE — 82310 ASSAY OF CALCIUM: CPT

## 2024-02-19 PROCEDURE — 63600000 HC DRUGS/DETAIL CODE: Performed by: STUDENT IN AN ORGANIZED HEALTH CARE EDUCATION/TRAINING PROGRAM

## 2024-02-19 PROCEDURE — 99233 SBSQ HOSP IP/OBS HIGH 50: CPT | Performed by: INTERNAL MEDICINE

## 2024-02-19 PROCEDURE — 83735 ASSAY OF MAGNESIUM: CPT

## 2024-02-19 RX ORDER — DEXTROSE 50 % IN WATER (D50W) INTRAVENOUS SYRINGE
25 AS NEEDED
Status: CANCELLED | OUTPATIENT
Start: 2024-02-19

## 2024-02-19 RX ORDER — BUTALBITAL, ACETAMINOPHEN AND CAFFEINE 50; 325; 40 MG/1; MG/1; MG/1
1 TABLET ORAL ONCE
Status: COMPLETED | OUTPATIENT
Start: 2024-02-19 | End: 2024-02-19

## 2024-02-19 RX ORDER — ONDANSETRON HYDROCHLORIDE 2 MG/ML
4 INJECTION, SOLUTION INTRAVENOUS
Status: CANCELLED | OUTPATIENT
Start: 2024-02-19

## 2024-02-19 RX ORDER — PROPOFOL 200MG/20ML
SYRINGE (ML) INTRAVENOUS AS NEEDED
Status: DISCONTINUED | OUTPATIENT
Start: 2024-02-19 | End: 2024-02-19 | Stop reason: SURG

## 2024-02-19 RX ORDER — PANTOPRAZOLE SODIUM 40 MG/1
40 TABLET, DELAYED RELEASE ORAL DAILY
Status: DISCONTINUED | OUTPATIENT
Start: 2024-02-19 | End: 2024-02-20 | Stop reason: HOSPADM

## 2024-02-19 RX ORDER — SODIUM CHLORIDE 9 MG/ML
INJECTION, SOLUTION INTRAVENOUS CONTINUOUS PRN
Status: DISCONTINUED | OUTPATIENT
Start: 2024-02-19 | End: 2024-02-19 | Stop reason: SURG

## 2024-02-19 RX ORDER — EPHEDRINE SULFATE/0.9% NACL/PF 50 MG/5 ML
10 SYRINGE (ML) INTRAVENOUS AS NEEDED
Status: CANCELLED | OUTPATIENT
Start: 2024-02-19

## 2024-02-19 RX ORDER — PROPOFOL 10 MG/ML
INJECTION, EMULSION INTRAVENOUS CONTINUOUS PRN
Status: DISCONTINUED | OUTPATIENT
Start: 2024-02-19 | End: 2024-02-19 | Stop reason: SURG

## 2024-02-19 RX ORDER — POLYETHYLENE GLYCOL 3350, SODIUM CHLORIDE, SODIUM BICARBONATE, POTASSIUM CHLORIDE 420; 11.2; 5.72; 1.48 G/4L; G/4L; G/4L; G/4L
4000 POWDER, FOR SOLUTION ORAL ONCE
Status: COMPLETED | OUTPATIENT
Start: 2024-02-19 | End: 2024-02-19

## 2024-02-19 RX ORDER — DEXTROSE 40 %
15-30 GEL (GRAM) ORAL AS NEEDED
Status: CANCELLED | OUTPATIENT
Start: 2024-02-19

## 2024-02-19 RX ORDER — METOCLOPRAMIDE HYDROCHLORIDE 5 MG/ML
10 INJECTION INTRAMUSCULAR; INTRAVENOUS
Status: CANCELLED | OUTPATIENT
Start: 2024-02-19

## 2024-02-19 RX ORDER — SUMATRIPTAN SUCCINATE 25 MG/1
50 TABLET ORAL ONCE
Status: DISCONTINUED | OUTPATIENT
Start: 2024-02-19 | End: 2024-02-19

## 2024-02-19 RX ORDER — IBUPROFEN 200 MG
16-32 TABLET ORAL AS NEEDED
Status: CANCELLED | OUTPATIENT
Start: 2024-02-19

## 2024-02-19 RX ORDER — DIPHENHYDRAMINE HCL 50 MG/ML
12.5 VIAL (ML) INJECTION
Status: CANCELLED | OUTPATIENT
Start: 2024-02-19

## 2024-02-19 RX ORDER — HYDRALAZINE HYDROCHLORIDE 20 MG/ML
5 INJECTION INTRAMUSCULAR; INTRAVENOUS
Status: CANCELLED | OUTPATIENT
Start: 2024-02-19

## 2024-02-19 RX ADMIN — PROPOFOL 200 MCG/KG/MIN: 10 INJECTION, EMULSION INTRAVENOUS at 11:23

## 2024-02-19 RX ADMIN — BUTALBITAL, ACETAMINOPHEN, AND CAFFEINE 1 TABLET: 325; 50; 40 TABLET ORAL at 03:40

## 2024-02-19 RX ADMIN — POLYETHYLENE GLYCOL 3350, SODIUM CHLORIDE, SODIUM BICARBONATE, POTASSIUM CHLORIDE 4000 ML: 420; 11.2; 5.72; 1.48 POWDER, FOR SOLUTION ORAL at 17:28

## 2024-02-19 RX ADMIN — PROPOFOL 40 MG: 10 INJECTION, EMULSION INTRAVENOUS at 11:24

## 2024-02-19 RX ADMIN — ATORVASTATIN CALCIUM 10 MG: 10 TABLET, FILM COATED ORAL at 17:29

## 2024-02-19 RX ADMIN — PANTOPRAZOLE SODIUM 40 MG: 40 TABLET, DELAYED RELEASE ORAL at 17:29

## 2024-02-19 RX ADMIN — ONDANSETRON 4 MG: 4 TABLET, ORALLY DISINTEGRATING ORAL at 19:02

## 2024-02-19 RX ADMIN — PROPOFOL 20 MG: 10 INJECTION, EMULSION INTRAVENOUS at 11:25

## 2024-02-19 RX ADMIN — PROPOFOL 20 MG: 10 INJECTION, EMULSION INTRAVENOUS at 11:26

## 2024-02-19 RX ADMIN — SODIUM CHLORIDE: 0.9 INJECTION, SOLUTION INTRAVENOUS at 11:20

## 2024-02-19 ASSESSMENT — COGNITIVE AND FUNCTIONAL STATUS - GENERAL
MOVING TO AND FROM BED TO CHAIR: 4 - NONE
MOVING TO AND FROM BED TO CHAIR: 4 - NONE
WALKING IN HOSPITAL ROOM: 4 - NONE
STANDING UP FROM CHAIR USING ARMS: 4 - NONE
STANDING UP FROM CHAIR USING ARMS: 4 - NONE
WALKING IN HOSPITAL ROOM: 4 - NONE
CLIMB 3 TO 5 STEPS WITH RAILING: 3 - A LITTLE
CLIMB 3 TO 5 STEPS WITH RAILING: 3 - A LITTLE

## 2024-02-19 ASSESSMENT — ENCOUNTER SYMPTOMS: DYSRHYTHMIAS: 1

## 2024-02-19 NOTE — ASSESSMENT & PLAN NOTE
hgb as low as 8.4, currently up to 10.0  For  EGD today  GI following in this regard  Hold Eliquis for now

## 2024-02-19 NOTE — PLAN OF CARE
Care Coordination Admission Assessment Note    General Information:  Readmission Within the last 30 days: no previous admission in last 30 days  Does patient have a : No  Patient-Specific Goals (include timeframe):      Living Arrangements:  Arrived From: home  Current Living Arrangements: independent living facility ([facility])  People in Home: spouse  Home Accessibility: wheelchair accessible  Living Arrangement Comments: 1 story villa, 0ste    Housing Stability and Utility Access (SDOH):  In the last 12 months, was there a time when you were not able to pay the mortgage or rent on time?: No  In the last 12 months, how many places have you lived?: 1  In the last 12 months, was there a time when you did not have a steady place to sleep or slept in a shelter (including now)?: No  In the past 12 months has the electric, gas, oil, or water company threatened to shut off services in your home?: No    Functional Status Prior to Admission:   Assistive Device/Animal Currently Used at Home: none  Functional Status Comments: IND PTA  IADL Comments: IND PTA, +Drives     Supports and Services:  Current Outpatient/Agency/Support Group: none  Type of Current Home Care Services:    History of home care episode or rehab stay: Denies Hx HC/SNF/[facility]    Discharge Needs Assessment:   Concerns to be Addressed: denies needs/concerns at this time, no discharge needs identified  Current Discharge Risk:    Anticipated Changes Related to Illness: none    Patient/Family Anticipated Discharge Plan:  Patient/Family Anticipates Transition To: home with family  Patient/Family Anticipated Services at Transition: none    Connection to Community  Not applicable      Patient Choice:   Offered/Gave Vendor List: no  Patient's Choice of Community Agency(s):         Anticipated Discharge Plan:  Met with patient. Provided education and contact information for Care Coordination services.: yes  Anticipated Discharge  Disposition: home without assistance or services     Transportation Needs (SDOH):  Transportation Concerns: none  Transportation Anticipated: family or friend will provide  Is Out of Hospital DNR needed at discharge?: no    In the past 12 months, has lack of transportation kept you from medical appointments or from getting medications?: No  In the past 12 months, has lack of transportation kept you from meetings, work, or from getting things needed for daily living?: No    Concerns - comments: Admit w/ GIB     Per discharge planning rounds, pt went for EGD this morning.     SW met with pt at bedside to introduce self and role. Pt confirms the above demographics including residing at [facility], pharmacy and PCP. Pt asked that SW add her son Bird to emergency contact, however he cannot be contacted at this time [place]. Pt denies needs at this time and informs a member of her family will transport home when medically stable. IMM done. SW to follow.    Dispo:  Home - [facility]

## 2024-02-19 NOTE — OP NOTE
_______________________________________________________________________________  Patient Name: Shalini Kunz              Procedure Date: 2/19/2024 11:08 AM  MRN: [id number]                     Account Number: [id number]  YOB: 1935              Age: 88  Gender: Female                        Note Status: Finalized  Attending MD: KATLYN PRINCE MD~NIKI,  _______________________________________________________________________________  Procedure:             Upper GI endoscopy  Indications:           Coffee-ground emesis  Providers:             KATLYN PRINCE MD~NIKI (Doctor), BLADIMIR NEGRO DO~SRUTHI (Fellow), Michelle Rossi  RN (Nurse), Apple Moreno RN (Nurse)  Referring MD:          LOAN BRAN DO  Requesting Provider:  Medicines:             Monitored Anesthesia Care  Complications:         No immediate complications.  _______________________________________________________________________________  Procedure:             After obtaining informed consent, the endoscope was  passed under direct vision. Throughout the procedure,  the patient's blood pressure, pulse, and oxygen  saturations were monitored continuously. The Endoscope  was introduced through the mouth, and advanced to the  third part of duodenum. The upper GI endoscopy was  accomplished without difficulty. The patient tolerated  the procedure well.  Estimated Blood Loss:  Estimated blood loss was minimal.  Findings:  The examined esophagus was normal.  Evidence of a gastroenterostomy was found in the gastric body. The  anastomosis was characterized by red spot but otherwise normal-appearing.  The entire examined stomach was otherwise normal and contained bilious  fluid. Biopsies were taken with a cold forceps for Helicobacter pylori  testing. Estimated blood loss was minimal.  The cardia and gastric fundus were normal on retroflexion.  The examined small bowel was normal containing bile.  Suspect likely  duodenum.  Impression:            -No evidence of active bleeding.  - Normal esophagus.  - A gastroenterostomy was found, characterized by red  spot but otherwise normal-appearing.  - Normal stomach. Biopsied.  - Normal examined duodenum.  Recommendation:        - [facility].  Consider colonoscopy as inpatient vs. outpatient  pending hospital course.  - Advance diet as tolerated.  - Use a proton pump inhibitor PO daily.  - Await pathology results.  - The findings and recommendations were discussed with  the referring physician and patient.  Procedure Code(s):     --- Professional ---  15070, Esophagogastroduodenoscopy, flexible,  transoral; diagnostic, including collection of  specimen(s) by brushing or washing, when performed  (separate procedure)  Diagnosis Code(s):     --- Professional ---  K92.0, Hematemesis  CPT copyright 2021 American Medical Association. All rights reserved.  The codes documented in this report are preliminary and upon  review may  be revised to meet current compliance requirements.  Katlyn Harden MD  _____________________________________  KATLYN HARDEN MD~NIKI  2/19/2024 12:00:08 PM  This report has been signed electronically.  Number of Addenda: 0  Note Initiated On: 2/19/2024 11:08 AM

## 2024-02-19 NOTE — ANESTHESIA POSTPROCEDURE EVALUATION
Patient: Shalini Kunz    Procedure Summary     Date: 02/19/24 Room / Location: [facility] GI 2 / [facility]    Anesthesia Start: 1120 Anesthesia Stop: 1142    Procedure: UPPER GASTROINTESTINAL ENDOSCOPY WITH BIOPSY (Esophagus) Diagnosis:       Gastrointestinal hemorrhage, unspecified gastrointestinal hemorrhage type      Melena      Coffee ground emesis      (Gastrointestinal hemorrhage, unspecified gastrointestinal hemorrhage type [K92.2])      (Melena [K92.1])      (Coffee ground emesis [K92.0])    Providers: Yohana Harden MD Responsible Provider: Brian Orozco MD    Anesthesia Type: MAC ASA Status: 3          Anesthesia Type: MAC  PACU Vitals    No data found in the last 10 encounters.       vss    Anesthesia Post Evaluation    Pain management: adequate  Patient location during evaluation: PACU  Patient participation: complete - patient participated  Level of consciousness: awake and alert  Cardiovascular status: acceptable  Airway Patency: adequate  Respiratory status: acceptable  Hydration status: acceptable  Anesthetic complications: no

## 2024-02-19 NOTE — ASSESSMENT & PLAN NOTE
Known to Dr. Guillaume  Hx of PAF managed with rate control and anticoagulation therapy   Currently maintaining NSR and rate controlled on current dose of metoprolol  Eliquis currently on hold in the setting of GI bleed  We will need continual reassessment of risk and benefits of AC therapy if/ falls or recurrent bleeding, at which time may need to consider referral for LISA closure watchman procedure

## 2024-02-19 NOTE — ASSESSMENT & PLAN NOTE
Scheduled for EGD today for evaluation of GI bleed  Appears compensated.  ECG without evidence for ischemia  Recent pharmacologic stress test in January 2024 without evidence for ischemia  Can proceed with low acceptable CV risk rosie-and postoperative beta-blocker

## 2024-02-19 NOTE — ANESTHESIA PREPROCEDURE EVALUATION
Relevant Problems   CARDIOVASCULAR   (+) Aortic valve disease   (+) Essential hypertension   (+) Mitral valve disease   (+) Paroxysmal atrial fibrillation (CMS/HCC)      GASTROINTESTINAL   (+) Coffee ground emesis   (+) Gastrointestinal hemorrhage, unspecified gastrointestinal hemorrhage type      HEMATOLOGY   (+) Chronic anticoagulation         Narrative    Normal sinus rhythm   Normal ECG   Confirmed by Júnior Sanchez ([id number]) on 2/18/2024 1:06:19 PM     2024 stress     •  Negative lexiscan ECG test. Nuclear images pending and are to be reported by radiology  •  Response to Stress: There were no arrhythmias during stress. There were no arrhythmias during recovery.     2023 echo  •  Left Ventricle: Normal ventricle size. Normal wall thickness. Estimated EF 55%. No regional wall motion abnormalities. Grade I diastolic dysfunction.  •  Right Ventricle: Normal ventricle size. Low normal systolic function.  •  Left Atrium: Mildly dilated atrium.  •  Right Atrium: Normal sized atrium.  •  Aortic Valve: Tricuspid valve. Moderate regurgitation. No stenosis.  Aorta top normal 3.8 cm  •  Mitral Valve: Bileaflet thickening. Mild regurgitation. The jet is eccentric.  •  Tricuspid Valve: Normal structure. Mild regurgitation. Estimated RVSP = 27 mmHg.  •  Pulmonic Valve: Normal structure. Trace regurgitation.  •  IVC/SVC: Inferior vena cava is <2.1cm. Inferior vena cava collapses >50% during inspiration.  •  Pericardium: Normal structure.              Anesthesia ROS/MED HX    Anesthesia History    Previous anesthetics  No history of anesthetic complications  Pulmonary - neg  Neuro/Psych - neg  Cardiovascular   Valvular problems/murmurs   hypertension  Dysrhythmias and atrial fibrillation   CHF   Echocardiogram reviewed and ECG reviewed  GI/Hepatic   liver disease  Endo/Other  Body Habitus: Normal       Past Surgical History:   Procedure Laterality Date   • ABDOMINAL SURGERY      2019   • ADENOIDECTOMY Bilateral    • CHOLECYSTECTOMY   10/03/2011   • EYE SURGERY      2020    • GANGLION CYST EXCISION     • GASTRIC BYPASS     • TONSILLECTOMY         Physical Exam    Airway   Mallampati: II   TM distance: >3 FB   Neck ROM: full  Cardiovascular - normal   Rhythm: regular   Rate: normalPulmonary - normal   clear to auscultation  Dental - normal        Anesthesia Plan    Plan: MAC   3 ASA  Blood Products:     Use of Blood Products Discussed: Yes     Consented to blood products  Anesthetic plan and risks discussed with: patient  Induction:    intravenous   Comments:    Plan: Risk of cardiac arrest/aspiration/airway trauma/prolonged intubation/bleeding/nerve injury discussed. Patient would like to proceed.

## 2024-02-19 NOTE — PROGRESS NOTES
Internal Medicine  Daily Progress Note       SUBJECTIVE   This is a 88 y.o. year-old female admitted on 2/17/2024 with Dilated bile duct [K83.8]  Hypotension, unspecified hypotension type [I95.9]  Gastrointestinal hemorrhage, unspecified gastrointestinal hemorrhage type [K92.2]  Nausea and vomiting, unspecified vomiting type [R11.2].    Interval History: No acute concerns for active bleed this AM. Patient free of complaints at this time, denies nausea or emesis. Is aware of plan for EGD and asks about procedure timing. States that she is hungry.     OBJECTIVE   Vital signs in last 24 hours:  Temp:  [36.4 °C (97.5 °F)-37.2 °C (99 °F)] 36.6 °C (97.9 °F)  Heart Rate:  [69-90] 69  Resp:  [16-18] 16  BP: ()/(44-61) 96/49  SpO2:  [97 %-100 %] 100 %  Oxygen Therapy: Supplemental oxygen  O2 Flow Rate (L/min):  [3 L/min] 3 L/min    Weight & I/Os:  Weights (last 5 days)     Date/Time Weight Drug Calculation Weight (Dosing Weight)    02/17/24 1630 66.3 kg (146 lb 3.2 oz) --    02/17/24 1229 66.1 kg (145 lb 11.6 oz) 66.1 kg (145 lb 11.6 oz)    02/17/24 0754 65.3 kg (144 lb) --          Intake/Output Summary (Last 24 hours) at 2/19/2024 0659  Last data filed at 2/18/2024 1029  24 Hour Net Input/Output from 7AM Yesterday   Intake 1055 ml   Output --   Net 1055 ml        PHYSICAL EXAMINATION   Physical Exam  Constitutional:       Appearance: Normal appearance.   HENT:      Head: Normocephalic and atraumatic.   Cardiovascular:      Rate and Rhythm: Normal rate and regular rhythm.      Pulses: Normal pulses.      Heart sounds: Normal heart sounds.   Pulmonary:      Effort: Pulmonary effort is normal.      Breath sounds: Normal breath sounds.   Abdominal:      Tenderness: There is abdominal tenderness.      Comments: Epigastric tenderness   Skin:     General: Skin is warm and dry.   Neurological:      General: No focal deficit present.      Mental Status: She is alert and oriented to person, place, and time.    LINES,  CATHETERS, DRAINS, AIRWAYS, & WOUNDS   Lines, drains, airways, & wounds:  Peripheral IV (Adult) 02/17/24 Left Antecubital (Active)   Number of days: 1       Peripheral IV (Adult) 02/17/24 Right Hand (Active)   Number of days: 1        LABS, IMAGING, & TELE   Labs:  Reviewed    Results from last 7 days   Lab Units 02/19/24  0509 02/18/24  2348 02/18/24  1710   WBC K/uL 3.93 5.18 6.69   HEMOGLOBIN g/dL 8.4* 9.0* 9.2*   HEMATOCRIT % 26.4* 28.3* 29.1*   PLATELETS K/uL 115* 126* 126*       Results from last 7 days   Lab Units 02/19/24  0509 02/18/24  0611 02/17/24  0759   SODIUM mEQ/L 140 142 139   POTASSIUM mEQ/L 4.2 4.1 5.0   CHLORIDE mEQ/L 112* 113* 108*   CO2 mEQ/L 23 24 26   BUN mg/dL 13 32* 69*   CREATININE mg/dL 0.5* 0.6 0.8   CALCIUM mg/dL 7.7* 7.6* 9.3   ALBUMIN g/dL  --  2.6* 3.5   BILIRUBIN TOTAL mg/dL  --  0.5 0.4   ALK PHOS IU/L  --  32* 44   ALT IU/L  --  7 12   AST IU/L  --  11* 13   GLUCOSE mg/dL 94 81 112*     Imaging personally reviewed (does not include unread studies):  No results found.  Telemetry/EKG:  Reviewed.     ASSESSMENT & PLAN   * Gastrointestinal hemorrhage, unspecified gastrointestinal hemorrhage type  Assessment & Plan  Patient presenting with abrupt onset of vomiting and diarrhea.  Her blood pressure was in the 75/56 on admission, with HR of 101.  Patient reports that both her emesis and diarrhea were black in nature. She is on Eliquis 2.5 p.o. twice daily. She has history of prior GI bleeding as well. On 2/2/2024 her hemoglobin was 14, her hemoglobin on admission was 10.5.  Lactate, troponins, and lipase were all negative in the ER (lactate spiked to 2.6 but corrected)  S/p 1 unit packed red blood cell transfusion and 1.5 L normal saline fluid bolus in ED    Her CT A/P on 2/17/24: showed moderate bile duct dilation (ISO of her cholecystectomy), otherwise unrevealing.   EGD on 2/19 negative for acute bleed    Plan:  - PO protonix qd  - Checking C. difficile stool antigen (negative), ova  and parasite screen  - CBC daily, and hold Eliquis 2.5 mg p.o. twice daily  - Zofran 4 mg IV as needed  - Consulted gastroenterology for recommendations  - Diet resumed    Paroxysmal atrial fibrillation (CMS/HCC)  Assessment & Plan  Patient has history of paroxysmal atrial fibrillation.  She is in normal sinus rhythm, and takes Eliquis 2.5 mg p.o. twice daily she was also on metoprolol XL 25 mg p.o. daily which was held due to low blood pressures.    Plan:  - Hold Eliquis and metoprolol at this time    Abnormal computed tomography scan  Assessment & Plan  Incidental Moderate Biliary Duct Dilation and Mild PD Dilation  Seen on CT Abd/pelvis with moderate intrahepatic and extrahepatic bile duct dilation with CBD measuring 1.2 cm and PD up to 0.5 cm without any discrete lesions or masses. Possibly in setting of prior CCY along with history of prior surgery (gastrojejunal anastomosis). Not on chronic opioids. LFTs wnl along with T Bili.    Plan per GI:   - Obtain MRI/MRCP WWO contrast as an outpatient given both biliary and PD dilation, but suspect secondary to surgery       Grade I diastolic dysfunction  Assessment & Plan  3/7/2023.  Patient had transthoracic echocardiogram showing:  Left Ventricle: Normal ventricle size. Normal wall thickness. Estimated EF 55%. No regional wall motion abnormalities. Grade I diastolic dysfunction.    Follow with Dr. Ron Guillaume as her cardiologist. She had a recent myocardial perfusion SPECT in Jan 2024 which was negative.      History of pyloric stenosis  Assessment & Plan  6/4/2019: status post EGD with finding of benign pyloric stricture status post dilation and biopsy  6/19/2019: Status post EGD with persistent pyloric stenosis status post repeat dilation with steroid injection  7/11/2019 status post EGD with refractory benign pyloric stricture with gastric outlet obstruction status post repeat balloon dilation    These endoscopies were done by Dr Vlad Anderson MD at [facility]. She has been stable since then, as far as I'm aware.    Age-related osteoporosis without current pathological fracture  Assessment & Plan  Reviewed patient's DEXA scan from August 2023.  Lumbar spine T-score -2.8, in line with diagnosis of osteoporosis.  She is on calcium and vitamin D supplementation as outpatient.    Plan:  - Follow-up with PCP for consideration of bisphosphonate therapy       VTE Assessment: Padua    VTE Prophylaxis: Current anticoagulants:  [Provider Managed Hold] apixaban (ELIQUIS) tablet 2.5 mg, oral, BID      Code Status: Full Code  Estimated discharge date: 2/21/2024       ATTENDING DOCUMENTATION  ALSO SEE ATTENDING ATTESTATION SECTION OF NOTE

## 2024-02-19 NOTE — CONSULTS
Cardiology Consult Note  Subjective     Shalini Kunz is a 88 y.o. female with hx of Htn, PAF, HL, diastolic dysfunction, moderate AI and remote DVT in 2019. She was admitted with GI bleed with reported lightheadedness, fatigue, and nausea prior to admission. She denied actual melena or bleeding/ Drop in Hgb as low as 8.4.  Scheduled for EGD today.  Asked to provide preoperative cardiovascular risk assessment.    She is able to walk for miles & up 1-2 flights of stairs without chest pain or shortness of breath. She recently traveled to [place] and took a cruise to [place] without limitations or episodes of angina. She denies palpitations, lightheadedness, edema, or syncope.    K 4.2  Mag 2.1  Hs trop 7.7  Hgb 8.4--->8.7--->10.3            Medical History:   Past Medical History:   Diagnosis Date   • Anemia    • Arthritis     thumbs   • Deep vein thrombosis (CMS/HCC)     2019   • Hypertension    • Lightheadedness    • Lipid disorder    • Liver disease        Surgical History:   Past Surgical History:   Procedure Laterality Date   • ABDOMINAL SURGERY      2019   • ADENOIDECTOMY Bilateral    • CHOLECYSTECTOMY  10/03/2011   • EYE SURGERY      2020    • GANGLION CYST EXCISION     • GASTRIC BYPASS     • TONSILLECTOMY         Social History:   Social History     Social History Narrative   • Not on file       Family History:   Family History   Problem Relation Age of Onset   • Alzheimer's disease Biological Mother    • Heart failure Biological Father    • No Known Problems Biological Sister        Allergies: Patient has no known allergies.    Current Facility-Administered Medications   Medication Dose Route Frequency Provider Last Rate Last Admin   • acetaminophen (TYLENOL) tablet 650 mg  650 mg oral q4h PRN Kizzy Sigala MD       • [Provider Managed Hold] apixaban (ELIQUIS) tablet 2.5 mg  2.5 mg oral BID Kimmy Liang DO       • atorvastatin (LIPITOR) tablet 10 mg  10 mg oral Daily (6p) Kimmy Liang DO   10 mg at  02/18/24 1806   • calcium carbonate-vitamin D3 500 mg-5 mcg (200 unit) per tablet 1 tablet  1 tablet oral Daily Francois Lema MD   1 tablet at 02/18/24 0903   • cholecalciferol (vitamin D3) tablet 1,000 Units  1,000 Units oral Daily Francois Lema MD   1,000 Units at 02/18/24 0903   • cyanocobalamin (VITAMIN B12) tablet 1,000 mcg  1,000 mcg oral Daily Francois Lema MD   1,000 mcg at 02/18/24 0903   • glucose chewable tablet 16-32 g of dextrose  16-32 g of dextrose oral PRN Kizzy Sigala MD        Or   • dextrose 40 % oral gel 15-30 g of dextrose  15-30 g of dextrose oral PRN Kizzy Sigala MD        Or   • glucagon (GLUCAGEN) injection 1 mg  1 mg intramuscular PRN Kizzy Sigala MD        Or   • dextrose 50 % in water (D50) injection 12.5 g  25 mL intravenous PRN Kizzy Sigala MD       • [Provider Managed Hold] metoprolol succinate XL (TOPROL-XL) 24 hr ER tablet 25 mg  25 mg oral Daily Kimmy Liang, DO       • multivitamin tablet 1 tablet  1 tablet oral Daily Francois Lema MD   1 tablet at 02/18/24 0902   • ondansetron ODT (ZOFRAN-ODT) disintegrating tablet 4 mg  4 mg oral q8h PRN Kizzy Sigala MD   4 mg at 02/18/24 1842   • pantoprazole (PROTONIX) 200 mg in sodium chloride 0.9 % 250 mL (0.8 mg/mL) infusion  8 mg/hr intravenous Continuous Nohemy Cartwright PA C 10 mL/hr at 02/18/24 2349 8 mg/hr at 02/18/24 2349       Review of Systems  Remainder of systems negative except as noted in HPI.    Objective   Labs  Recent labs reviewed by me.  Lab Results   Component Value Date    WBC 3.93 02/19/2024    HGB 8.4 (L) 02/19/2024    HCT 26.4 (L) 02/19/2024     (L) 02/19/2024    CHOL 154 02/02/2024    TRIG 196 (H) 02/02/2024    HDL 57 02/02/2024    ALT 7 02/18/2024    AST 11 (L) 02/18/2024     02/19/2024    K 4.2 02/19/2024     (H) 02/19/2024    CREATININE 0.5 (L) 02/19/2024    BUN 13 02/19/2024    CO2 23 02/19/2024    TSH 3.52 02/02/2024     "INR 1.2 02/17/2024    HGBA1C 5.4 02/02/2024     No results found for: \"CKTOTAL\", \"CKMB\", \"CKMBINDEX\", \"TROPONINI\"    Imaging  Recent imaging reviewed by me.    ECG   Personally reviewed by me.  NSR 96 bpm, no ischemia    Telemetry  Personally reviewed by me. No events    EXAM  GEN - NAD  HEENT - OP Clear, MMM  CV - Reg S1,S2, No M/R/G  PULM - CTA B/L  ABD - Soft, NT/ND +BS  EXT - No C/C/E  PSYCH - AAO x 3  SKIN - Warm/Dry.  No lesions or rashes.          ASSESSMENT AND PLAN     Preoperative cardiovascular examination  Assessment & Plan  Scheduled for EGD today for evaluation of GI bleed  Appears compensated.  ECG without evidence for ischemia  Recent pharmacologic stress test in January 2024 without evidence for ischemia  Can proceed with low acceptable CV risk rosie-and postoperative beta-blocker    Paroxysmal atrial fibrillation (CMS/HCC)  Assessment & Plan  Known to Dr. Guillaume  Hx of PAF managed with rate control and anticoagulation therapy   Currently maintaining NSR and rate controlled on current dose of metoprolol  Eliquis currently on hold in the setting of GI bleed  We will need continual reassessment of risk and benefits of AC therapy if/ falls or recurrent bleeding, at which time may need to consider referral for LISA closure watchman procedure    Other hyperlipidemia  Assessment & Plan  Continue Lipitor    * Gastrointestinal hemorrhage, unspecified gastrointestinal hemorrhage type  Assessment & Plan  hgb as low as 8.4, currently up to 10.0  For  EGD today  GI following in this regard  Hold Eliquis for now    Michelle Florentino PA-C acting as scribe and in the presence of  MD Michelle Cabrera PA C  2/19/2024  "

## 2024-02-19 NOTE — PERIOPERATIVE NURSING NOTE
Patient leaves endoscopy with transporter via stretcher.  Both hearing aids are returned to patient and in both ears.  Patient denies complaints. VSS   Physical Exam    Constitutional: No acute distress.   Eyes: Conjunctiva pink, Sclera clear, PERRLA, EOMI.  ENT: No sinus tenderness. No nasal discharge. No oropharyngeal erythema, edema, or exudates. Uvula midline.   Cardiovascular: Regular rate, regular rhythm. No noted murmurs rubs or gallops.  Respiratory: unlabored respiratory effort, clear to auscultation bilaterally no wheezing, rales or rhonchi  Gastrointestinal: Normal bowel sounds. soft, non distended, non-tender abdomen.   Musculoskeletal: supple neck, no midline tenderness. No joint or bony deformity.   Integumentary: warm, dry, no rash  Neurologic: awake, alert, cranial nerves II-XII grossly intact, extremities’ motor and sensory functions grossly intact  Psychiatric: appropriate mood, appropriate affect

## 2024-02-19 NOTE — PLAN OF CARE
Plan of Care Review  Plan of Care Reviewed With: patient  Progress: no change  Outcome Evaluation: Pt went for endo today. Clear liquid diet started. NPO after midnight  Pt started bowel prep for colonscopy tomorrow.

## 2024-02-19 NOTE — PLAN OF CARE
Plan of Care Review  Plan of Care Reviewed With: patient  Progress: no change  Outcome Evaluation: Pt OOB to bathroom multiple times overnight, no BMs, Fioricet given x1 for migraine with improvement, NPO after midnight except sips w med, plan EGD today

## 2024-02-19 NOTE — PROGRESS NOTES
Brief GI post procedure note:    Patient tolerated procedure well with no immediate complications.    Findings of EGD:  - Normal esophagus.   - Red spot at the anastomosis.   - Normal stomach.  Biopsied.   - Normal examined duodenum.   -No evidence of active bleeding.       Recommendations:  -[facility].   - Advance diet as tolerated.   - Use a proton pump inhibitor PO daily.   - Await pathology results.   - Plan for colonoscopy tomorrow given significant anemia. CLD today. Bowel prep ordered.   - The findings and recommendations were discussed with the referring physician and patient

## 2024-02-20 ENCOUNTER — ANESTHESIA (INPATIENT)
Dept: ENDOSCOPY | Facility: HOSPITAL | Age: 89
DRG: 378 | End: 2024-02-20
Payer: MEDICARE

## 2024-02-20 VITALS
WEIGHT: 146.2 LBS | OXYGEN SATURATION: 100 % | RESPIRATION RATE: 18 BRPM | BODY MASS INDEX: 23.5 KG/M2 | HEIGHT: 66 IN | DIASTOLIC BLOOD PRESSURE: 59 MMHG | HEART RATE: 83 BPM | TEMPERATURE: 98.8 F | SYSTOLIC BLOOD PRESSURE: 120 MMHG

## 2024-02-20 LAB
ANION GAP SERPL CALC-SCNC: 8 MEQ/L (ref 3–15)
BUN SERPL-MCNC: 7 MG/DL (ref 7–25)
CALCIUM SERPL-MCNC: 8.1 MG/DL (ref 8.6–10.3)
CASE RPRT: NORMAL
CHLORIDE SERPL-SCNC: 111 MEQ/L (ref 98–107)
CLINICAL INFO: NORMAL
CO2 SERPL-SCNC: 22 MEQ/L (ref 21–31)
CREAT SERPL-MCNC: 0.6 MG/DL (ref 0.6–1.2)
EGFRCR SERPLBLD CKD-EPI 2021: >60 ML/MIN/1.73M*2
ERYTHROCYTE [DISTWIDTH] IN BLOOD BY AUTOMATED COUNT: 12.9 % (ref 11.7–14.4)
GLUCOSE SERPL-MCNC: 95 MG/DL (ref 70–99)
HCT VFR BLD AUTO: 27.3 % (ref 35–45)
HGB BLD-MCNC: 8.9 G/DL (ref 11.8–15.7)
MAGNESIUM SERPL-MCNC: 2.1 MG/DL (ref 1.8–2.5)
MCH RBC QN AUTO: 31.3 PG (ref 28–33.2)
MCHC RBC AUTO-ENTMCNC: 32.6 G/DL (ref 32.2–35.5)
MCV RBC AUTO: 96.1 FL (ref 83–98)
PATH REPORT.FINAL DX SPEC: NORMAL
PATH REPORT.GROSS SPEC: NORMAL
PDW BLD AUTO: 10 FL (ref 9.4–12.3)
PLATELET # BLD AUTO: 123 K/UL (ref 150–369)
POTASSIUM SERPL-SCNC: 3.9 MEQ/L (ref 3.5–5.1)
RBC # BLD AUTO: 2.84 M/UL (ref 3.93–5.22)
SODIUM SERPL-SCNC: 141 MEQ/L (ref 136–145)
WBC # BLD AUTO: 4.24 K/UL (ref 3.8–10.5)

## 2024-02-20 PROCEDURE — 0DJD8ZZ INSPECTION OF LOWER INTESTINAL TRACT, VIA NATURAL OR ARTIFICIAL OPENING ENDOSCOPIC: ICD-10-PCS | Performed by: INTERNAL MEDICINE

## 2024-02-20 PROCEDURE — 71000001 HC PACU PHASE 1 INITIAL 30MIN: Performed by: INTERNAL MEDICINE

## 2024-02-20 PROCEDURE — 36415 COLL VENOUS BLD VENIPUNCTURE: CPT | Performed by: PHYSICIAN ASSISTANT

## 2024-02-20 PROCEDURE — 25800000 HC PHARMACY IV SOLUTIONS: Performed by: ANESTHESIOLOGY

## 2024-02-20 PROCEDURE — 97162 PT EVAL MOD COMPLEX 30 MIN: CPT | Mod: GP

## 2024-02-20 PROCEDURE — 80048 BASIC METABOLIC PNL TOTAL CA: CPT | Performed by: PHYSICIAN ASSISTANT

## 2024-02-20 PROCEDURE — 75000011 HC COLONSCOPY BIOPSY: Performed by: INTERNAL MEDICINE

## 2024-02-20 PROCEDURE — 97165 OT EVAL LOW COMPLEX 30 MIN: CPT | Mod: GO

## 2024-02-20 PROCEDURE — 83735 ASSAY OF MAGNESIUM: CPT

## 2024-02-20 PROCEDURE — 63700000 HC SELF-ADMINISTRABLE DRUG

## 2024-02-20 PROCEDURE — 37000002 HC ANESTHESIA MAC: Performed by: INTERNAL MEDICINE

## 2024-02-20 PROCEDURE — 71000011 HC PACU PHASE 1 EA ADDL MIN: Performed by: INTERNAL MEDICINE

## 2024-02-20 PROCEDURE — 99239 HOSP IP/OBS DSCHRG MGMT >30: CPT | Performed by: INTERNAL MEDICINE

## 2024-02-20 PROCEDURE — 85027 COMPLETE CBC AUTOMATED: CPT | Performed by: PHYSICIAN ASSISTANT

## 2024-02-20 PROCEDURE — 63600000 HC DRUGS/DETAIL CODE: Mod: JW | Performed by: ANESTHESIOLOGY

## 2024-02-20 RX ORDER — METOPROLOL SUCCINATE 25 MG/1
25 TABLET, EXTENDED RELEASE ORAL DAILY
Qty: 30 TABLET | Refills: 0 | Status: SHIPPED | OUTPATIENT
Start: 2024-02-21 | End: 2024-06-23

## 2024-02-20 RX ORDER — PROPOFOL 10 MG/ML
INJECTION, EMULSION INTRAVENOUS CONTINUOUS PRN
Status: DISCONTINUED | OUTPATIENT
Start: 2024-02-20 | End: 2024-02-20 | Stop reason: SURG

## 2024-02-20 RX ORDER — PROPOFOL 200MG/20ML
SYRINGE (ML) INTRAVENOUS AS NEEDED
Status: DISCONTINUED | OUTPATIENT
Start: 2024-02-20 | End: 2024-02-20 | Stop reason: SURG

## 2024-02-20 RX ORDER — SODIUM CHLORIDE 9 MG/ML
INJECTION, SOLUTION INTRAVENOUS CONTINUOUS PRN
Status: DISCONTINUED | OUTPATIENT
Start: 2024-02-20 | End: 2024-02-20 | Stop reason: SURG

## 2024-02-20 RX ADMIN — PROPOFOL 100 MCG/KG/MIN: 10 INJECTION, EMULSION INTRAVENOUS at 08:14

## 2024-02-20 RX ADMIN — APIXABAN 2.5 MG: 2.5 TABLET, FILM COATED ORAL at 09:56

## 2024-02-20 RX ADMIN — SODIUM CHLORIDE: 0.9 INJECTION, SOLUTION INTRAVENOUS at 08:09

## 2024-02-20 RX ADMIN — CYANOCOBALAMIN TAB 1000 MCG 1000 MCG: 1000 TAB at 09:59

## 2024-02-20 RX ADMIN — PANTOPRAZOLE SODIUM 40 MG: 40 TABLET, DELAYED RELEASE ORAL at 09:59

## 2024-02-20 RX ADMIN — Medication 1000 UNITS: at 09:59

## 2024-02-20 RX ADMIN — PROPOFOL 100 MG: 10 INJECTION, EMULSION INTRAVENOUS at 08:13

## 2024-02-20 RX ADMIN — MULTIPLE VITAMINS W/ MINERALS TAB 1 TABLET: TAB at 09:59

## 2024-02-20 RX ADMIN — Medication 1 TABLET: at 09:59

## 2024-02-20 ASSESSMENT — ENCOUNTER SYMPTOMS: DYSRHYTHMIAS: 1

## 2024-02-20 ASSESSMENT — COGNITIVE AND FUNCTIONAL STATUS - GENERAL
MOVING TO AND FROM BED TO CHAIR: 3 - A LITTLE
DRESSING REGULAR UPPER BODY CLOTHING: 4 - NONE
STANDING UP FROM CHAIR USING ARMS: 3 - A LITTLE
EATING MEALS: 4 - NONE
STANDING UP FROM CHAIR USING ARMS: 4 - NONE
TOILETING: 4 - NONE
CLIMB 3 TO 5 STEPS WITH RAILING: 3 - A LITTLE
HELP NEEDED FOR PERSONAL GROOMING: 4 - NONE
MOVING TO AND FROM BED TO CHAIR: 4 - NONE
DRESSING REGULAR LOWER BODY CLOTHING: 4 - NONE
CLIMB 3 TO 5 STEPS WITH RAILING: 3 - A LITTLE
WALKING IN HOSPITAL ROOM: 3 - A LITTLE
WALKING IN HOSPITAL ROOM: 3 - A LITTLE
HELP NEEDED FOR BATHING: 4 - NONE
AFFECT: FLAT/BLUNTED AFFECT
AFFECT: WFL

## 2024-02-20 NOTE — PLAN OF CARE
Care Coordination Discharge Plan Summary    Admission Assessment Summary    General Information  Readmission Within the last 30 days: no previous admission in last 30 days  Does patient have a : No  Patient-Specific Goals (include timeframe):      Living Arrangements  Arrived From: home  Current Living Arrangements: independent living facility ([facility])  People in Home: spouse  Home Accessibility: wheelchair accessible  Living Arrangement Comments: 1 story villa, 0ste    Social Determinants of Health - Screenings  Housing Stability  In the last 12 months, was there a time when you were not able to pay the mortgage or rent on time?: No  In the last 12 months, how many places have you lived?: 1  In the last 12 months, was there a time when you did not have a steady place to sleep or slept in a shelter (including now)?: No  Utility Access  In the past 12 months has the electric, gas, oil, or water company threatened to shut off services in your home?: No  Transportation Needs  In the past 12 months, has lack of transportation kept you from medical appointments or from getting medications?: No  In the past 12 months, has lack of transportation kept you from meetings, work, or from getting things needed for daily living?: No    Functional Status Prior to Admission  Assistive Device/Animal Currently Used at Home: none  Functional Status Comments: IND PTA  IADL Comments: IND PTA, +Drives    Discharge Needs Assessment    Concerns to be Addressed: denies needs/concerns at this time, no discharge needs identified  Current Discharge Risk:    Anticipated Changes Related to Illness: none    Discharge Plan Summary    Patient Choice  Offered/Gave Vendor List: no       Concerns / Comments: Medically stable for discharge home today.    Discharge Plan:  Disposition/Destination: Home  / Home       Connection to Community  Not applicable    Community Resources:      Discharge Transportation:  Is Out  of Hospital DNR needed at Discharge: no  Does patient need discharge transport? No      Per discharge planning rounds, pt is medically stable for discharge home today. Plan for PT eval prior to discharge.     SW met with pt at bedside to introduce self and role. Pt denies needs at this time and informs her daughter is a physical therapist for additional assistance. SW informs should she change her mind SW can come back to arrange services or she can request services be arranged at [facility] if already discharged. Pt verbalized understanding and agreement with plan. SW to remain available should any needs arise.    Dispo:  Home - [facility]

## 2024-02-20 NOTE — PLAN OF CARE
Problem: Self-Care Deficit  Goal: Improved Ability to Complete Activities of Daily Living  Outcome: Progressing     Problem: Adult Inpatient Plan of Care  Goal: Plan of Care Review  Outcome: Progressing  Flowsheets (Taken 2/20/2024 1615)  Progress: improving  Outcome Evaluation: OT deondre completed  Plan of Care Reviewed With: patient

## 2024-02-20 NOTE — PLAN OF CARE
Problem: Adult Inpatient Plan of Care  Goal: Plan of Care Review  2/20/2024 1242 by Leslie Harp, PT  Outcome: Progressing  Flowsheets (Taken 2/20/2024 1242)  Outcome Evaluation: Pt eval completed.  Plan of Care Reviewed With: patient     Problem: Mobility Impairment  Goal: Optimal Mobility  Outcome: Progressing

## 2024-02-20 NOTE — ANESTHESIOLOGIST PRE-PROCEDURE ATTESTATION
Pre-Procedure Patient Identification:  I am the Primary Anesthesiologist and have identified the patient on 02/20/24 at 7:20 AM.   I have confirmed the procedure(s) will be performed by the following surgeon/proceduralist Karlee Green DO.

## 2024-02-20 NOTE — PROGRESS NOTES
Brief GI post procedure note:    Patient tolerated procedure well with no immediate complications.    Findings of Colon:  - Hemorrhoids found on perianal exam.   - The examined portion of the ileum was normal.   - Diverticulosis in the sigmoid colon and in the descending colon.   - Non-bleeding internal hemorrhoids.   - The examination was otherwise normal on direct and retroflexion views.   - No specimens collected.   - No blood or bleeding lesions identified.       Recommendations:  - [facility] to [facility] for [facility] care.   - Resume regular diet.   - Resume Eliquis (apixaban) at prior dose today.   - The findings and recommendations were discussed with the referring physician.   -GI will sign off. Please re-engage with any questions or concerns.

## 2024-02-20 NOTE — ANESTHESIA POSTPROCEDURE EVALUATION
Patient: Shalini Kunz    Procedure Summary     Date: 02/20/24 Room / Location: [facility] GI 2 / [facility] GI    Anesthesia Start: 0809 Anesthesia Stop: 0839    Procedure: COLONOSCOPY W/ OR W/O BIOPSY (Anus) Diagnosis:       Gastrointestinal hemorrhage, unspecified gastrointestinal hemorrhage type      Anemia associated with acute blood loss      (Gastrointestinal hemorrhage, unspecified gastrointestinal hemorrhage type [K92.2])      (Anemia associated with acute blood loss [D62])    Providers: Yohana Harden MD Responsible Provider: Aminata Kebede MD    Anesthesia Type: MAC ASA Status: 3          Anesthesia Type: MAC  PACU Vitals    No data found in the last 10 encounters.           Anesthesia Post Evaluation    Pain management: adequate  Mode of pain management: IV medication  Patient location during evaluation: PACU  Patient participation: complete - patient participated  Level of consciousness: awake and alert  Cardiovascular status: acceptable  Airway Patency: adequate  Respiratory status: acceptable  Hydration status: acceptable  Anesthetic complications: no

## 2024-02-20 NOTE — DISCHARGE SUMMARY
Internal Medicine  Inpatient Discharge Summary        BRIEF OVERVIEW   Admitting Provider: Kizzy Sigala MD  Attending Provider: Ron Corado, * Attending phys phone: [phone]    PCP: Renée Pittman [phone]    Admission Date: 2/17/2024  Discharge Date: 2/20/2024     DISCHARGE DIAGNOSES      Primary Discharge Diagnosis  Gastrointestinal hemorrhage, unspecified gastrointestinal hemorrhage type    Secondary Discharge Diagnoses  Active Hospital Problems    Diagnosis Date Noted   • Gastrointestinal hemorrhage, unspecified gastrointestinal hemorrhage type 02/17/2024     Priority: High   • Paroxysmal atrial fibrillation (CMS/HCC) 11/30/2018     Priority: Medium   • Preoperative cardiovascular examination 02/19/2024   • Hypomagnesemia 02/19/2024   • Abnormal computed tomography scan 02/18/2024   • Age-related osteoporosis without current pathological fracture 02/17/2024   • History of pyloric stenosis 02/17/2024   • Grade I diastolic dysfunction 02/17/2024   • Other hyperlipidemia 11/30/2018      Resolved Hospital Problems   No resolved problems to display.       Active Problem List on Day of Discharge  Patient Active Problem List   Diagnosis   • Paroxysmal atrial fibrillation (CMS/HCC)   • Palpitations   • Lightheadedness   • Other hyperlipidemia   • Essential hypertension   • Chronic anticoagulation   • B12 deficiency   • Vitamin D deficiency   • Mitral valve disease   • Aortic valve disease   • Gastrointestinal hemorrhage, unspecified gastrointestinal hemorrhage type   • Age-related osteoporosis without current pathological fracture   • History of pyloric stenosis   • Grade I diastolic dysfunction   • Abnormal computed tomography scan   • Melena   • Coffee ground emesis   • Preoperative cardiovascular examination   • Hypomagnesemia   • Anemia associated with acute blood loss     SUMMARY OF HOSPITALIZATION      Presenting Problem/History of Present Illness  This is a 88 y.o. year-old  female admitted on 2/17/2024 with Dilated bile duct [K83.8]  Hypotension, unspecified hypotension type [I95.9]  Gastrointestinal hemorrhage, unspecified gastrointestinal hemorrhage type [K92.2]  Nausea and vomiting, unspecified vomiting type [R11.2].    88 y.o. female with a past medical history of HTN, pAF on Eliquis, HLD, anemia, osteoporosis, hx of pyloric stenosis (s/p balloon dilation x3)  who presents with persistent N/V/D.     Patient states that over the last day or so she has had persistent coffee-ground emesis and black color diarrhea as well.  Overnight she had about 3 episodes of this.  This was associated with abdominal discomfort.  2 days ago she took Imodium and Pepto-Bismol as well.     She denies any headaches fevers, SOB, chest pain.  Denies significant abdominal pain.  She denies any falls, she did feel a little lightheaded over the past day or so.     She does endorse a recent travel history of flying to [place], and then going on a 16-day cruise to [place] about a month ago.  A few weeks ago she also had COVID infection as well.  She tells me that she has prior history of GI bleeds, and is currently on Eliquis for paroxysmal atrial fibrillation.     Allergies: None.  She confirms full CODE STATUS at this time.     In the ER  Vitals: Afebrile, heart rate 101, RR 17, BP 75/56, satting well on room air  CBC shows hemoglobin 10.5 (was 14 on 2/2/24), WBC 9  Lipase, troponins are negative  Lactate 1.4, repeat was 2.3  CMP shows BUN 69  She received 1 unit packed red blood cells in the ER  She received 1.5 L fluid bolus     CT abdomen pelvis was nonrevealing, showing moderate bile duct dilation in the setting of cholecystectomy    Hospital Course  #GI hemorrhage, unspecified  Patient presented with abrupt vomiting and diarrhea, BP 75/56 on admission. She received 1 U PRBC and was admitted for further management. Eliquis was held until last day of discharge during workup. EDG on 2/19 negative for  acute bleed, colonoscopy on 2/20 negative for acute bleed. GI deemed stable to resume AC and will require PPI indefinitely with consideration for capsule study as outpatient should symptoms resolve.     #Paroxysmal Afib  Eliquis and metoprolol held during admission. Eliquis resumed on DC. Patient can resume metoprolol 2/21.    #Abnormal CT scan  Patient with CT abdomen pelvis with incidental findings of moderate biliary duct dilation and mild PD dilation.  Per GI will require outpatient MRI/MRCP with and without contrast as an outpatient given both biliary and PD dilation however they are suspecting it is secondary to surgery.    # Age-related osteoporosis  Patient's DEXA scan reviewed while inpatient T-score of -2.8 will require PCP follow-up for consideration of bisphosphonate therapy    Exam on Day of Discharge  Physical Exam  Constitutional:       Appearance: Normal appearance.   HENT:      Head: Normocephalic and atraumatic.   Cardiovascular:      Rate and Rhythm: Normal rate and regular rhythm.      Pulses: Normal pulses.      Heart sounds: Normal heart sounds.   Pulmonary:      Effort: Pulmonary effort is normal.      Breath sounds: Normal breath sounds.   Abdominal:      Tenderness: There is no abdominal tenderness  Skin:     General: Skin is warm and dry.   Neurological:      General: No focal deficit present.      Mental Status: She is alert and oriented to person, place, and time.   Consults During Admission  IP CONSULT TO GASTROENTEROLOGY  IP CONSULT TO CARDIOLOGY    DISCHARGE MEDICATIONS   New, changed, or stopped medications from this admission:       Medication List      CONTINUE taking these medications    atorvastatin 10 mg tablet  Commonly known as: LIPITOR  TAKE 1/2 TABLET  (5 MG TOTAL) BY MOUTH DAILY.  Notes to patient: Next dose 2/20 PM     CALCIUM 600 WITH VITAMIN D3 600 mg-10 mcg (400 unit) chewable tablet  Take 600 mg by mouth once.  Dose: 600 mg  Generic drug: calcium carbonate-vitamin  D3  Notes to patient: Next dose 2/21     cholecalciferol (vitamin D3) 1,000 unit (25 mcg) tablet  Take 1 tablet (1,000 Units total) by mouth daily.  Dose: 1,000 Units  Notes to patient: Next dose 2/21     cyanocobalamin 1,000 mcg tablet  Commonly known as: VITAMIN B12  Take 1 tablet (1,000 mcg total) by mouth daily.  Dose: 1,000 mcg  Notes to patient: Next dose 2/21     ELIQUIS 2.5 mg tablet  TAKE 1 TABLET BY MOUTH  TWICE DAILY  Generic drug: apixaban  Notes to patient: Next dose 2/20 PM     metoprolol succinate XL 25 mg 24 hr tablet  Commonly known as: TOPROL-XL  Start taking on: February 21, 2024  Take 1 tablet (25 mg total) by mouth daily.  Dose: 25 mg     multivitamin capsule  Take 1 capsule by mouth daily.  Dose: 1 capsule  Notes to patient: Next dose 2/21     omeprazole 40 mg capsule  Commonly known as: PriLOSEC  Take 40 mg by mouth daily.  Dose: 40 mg  Notes to patient: Next dose 2/21     SUMAtriptan 50 mg tablet  Commonly known as: IMITREX  Take 1 tablet (50 mg total) by mouth once as needed for migraine.  Dose: 50 mg            Non-Medication orders at discharge:   Instructions for after discharge     Call provider for:  difficulty breathing, headache or visual disturbances      Call provider for:  persistent nausea or vomiting      Call provider for:  severe uncontrolled pain      Follow-up with primary physician (PCP)      Within 1 week for post-hospital follow up and repeat CBC. Per GI: holding on capsule study pending outpatient CBC and symptom correlation. Consider starting bisphosphonate therapy per osteoporosis.     Obtain MRI/MRCP WWO contrast as an outpatient given both biliary and PD dilation, but suspect secondary to surgery    Post-Discharge Activity: Normal activity as tolerated.      Normal activity as tolerated.                    PROCEDURES / LABS / IMAGING      Operative Procedures  EGD 2/19/24  Colonoscopy 2/20/24    Other Procedures  None    Pertinent Labs  Lab Results   Component Value  Date    WBC 4.24 02/20/2024    HGB 8.9 (L) 02/20/2024    HCT 27.3 (L) 02/20/2024    MCV 96.1 02/20/2024     (L) 02/20/2024     Lab Results   Component Value Date    GLUCOSE 95 02/20/2024    CALCIUM 8.1 (L) 02/20/2024     02/20/2024    K 3.9 02/20/2024    CO2 22 02/20/2024     (H) 02/20/2024    BUN 7 02/20/2024    CREATININE 0.6 02/20/2024       Pertinent Imaging  CT ABDOMEN PELVIS WITH IV CONTRAST    Result Date: 2/17/2024  IMPRESSION: 1.  Moderate bile duct dilation and mild pancreatic duct dilation, of indeterminate acuity given absence of prior imaging. MRI/MRCP without and with intravenous contrast can be considered if further imaging evaluation is desired. 2.  Additional chronic findings as delineated above. Actionable Finding: Follow up should be considered.    X-RAY CHEST 1 VIEW    Result Date: 2/17/2024  IMPRESSION:No evidence of acute disease COMMENT:Frontal portable erect view of the chest was performed with the patient rotated to the left and compared with the October 8, 2023 examination. Increased conspicuity of the perihilar middle to lower lung zone interstitial which does not meet criteria for pulmonary vascular distribution. No evidence of airspace disease. No evidence of a pleural effusion, pneumothorax or pneumomediastinum. Atheromatous and atherosclerotic changes of the imaged portions of the thoracic aorta.    NM MYOCARDIAL STRESS REST PHARM    Result Date: 1/29/2024  IMPRESSION: No evidence for pharmacologically induced ischemia. LVEF: 72%.      OUTPATIENT  FOLLOW-UP / REFERRALS / PENDING TESTS      Outpatient Follow-Up Appointments            In 3 months Ron Jose MD [facility] in [place]          Referrals  No orders of the defined types were placed in this encounter.      Test Results Pending at Discharge  Pending Inpatient Labs     Order Current Status    Pathology Tissue Exam In process          DISCHARGE DISPOSITION      Disposition: Home      Code Status At Discharge: Full Code    Physician Order for Life-Sustaining Treatment Document Status      No documents found                 ATTENDING DOCUMENTATION  ALSO SEE ATTENDING ATTESTATION SECTION OF NOTE

## 2024-02-20 NOTE — PROGRESS NOTES
Physical Therapy -  Initial Evaluation     Patient: Shalini Kunz  Location: [address] 05  MRN: [id number]  Today's date: 2/20/2024    HISTORY OF PRESENT ILLNESS     Shalini is a 88 y.o. female admitted on 2/17/2024 with Dilated bile duct [K83.8]  Hypotension, unspecified hypotension type [I95.9]  Gastrointestinal hemorrhage, unspecified gastrointestinal hemorrhage type [K92.2]  Nausea and vomiting, unspecified vomiting type [R11.2]. Principal problem is Gastrointestinal hemorrhage, unspecified gastrointestinal hemorrhage type.    Past Medical History  Shalini has a past medical history of Anemia, Arthritis, Deep vein thrombosis (CMS/HCC), Hypertension, Lightheadedness, Lipid disorder, and Liver disease.    History of Present Illness   Pt is an 87 y/o female who presents with persistent n/v/d, reports her emesis and diarrhea were black in nature.    EGD & Colonoscopy (-) findings      PRIOR LEVEL OF FUNCTION AND LIVING ENVIRONMENT     Prior Level of Function    Flowsheet Row Most Recent Value   Dominant Hand right   Ambulation independent   Transferring independent   Toileting independent   Bathing independent   Dressing independent   Eating independent   IADLs independent   Communication understands/communicates without difficulty   Prior Level of Function Comment reports being Independent w/ all fxn'l/ADL tasks w/o AD - PTA   Assistive Device Currently Used at Home none        Prior Living Environment    Flowsheet Row Most Recent Value   People in Home spouse   Current Living Arrangements independent living facility   Home Accessibility wheelchair accessible   Living Environment Comment Lives w/  at [facility]        VITALS AND PAIN     PT Vitals    Date/Time Pulse SpO2 Pt Activity O2 Therapy BP BP Location BP Method Pt Position Who   02/20/24 1144 76 100 % At rest None (Room air) 111/53 Right upper arm Automatic Lying SAP   02/20/24 1152 83 100 % At rest None (Room air) 120/59 Left upper arm  Automatic Sitting SAP      PT Pain    Date/Time Rating: Rest Rating: Activity Who   02/20/24 1144 0 -- SAP   02/20/24 1152 -- 0 SAP           Objective   OBJECTIVE     Start time:  1142  End time:  1154  Session Length: 12 min  Mode of Treatment: physical therapy, co-treatment (co-tx w/ OT to expedite dispo planning)    General Observations  Patient received supine, in bed, leg(s) elevated. She was agreeable to therapy, no issues or concerns identified by nurse prior to session.      Precautions: fall              PT Eval and Treat - 02/20/24 1212        Cognition    Orientation Status oriented x 4     Affect/Mental Status flat/blunted affect     Follows Commands WFL     Cognitive Function WFL     Comment, Cognition pleasant & cooperative        Vision Assessment/Intervention    Vision Assessment corrective lenses for reading        Hearing Assessment    Impact of Hearing Impairment on Functional Status mildly [place] - at times req'd repeated instructions/questions w/ incr'd volume        Sensory Assessment    Sensory Assessment sensation intact, lower extremities        Lower Extremity Assessment    LE Assessment ROM and Strength WFL except        Lower Extremity Strength    Hip, Left (Strength) 3-/5     Hip, Right (Strength) 3-/5        Motor Skills    Coordination --   alternating toe-taps - WFL       Bed Mobility    Bed Mobility Activities supine to sit;sit to supine     [place] supervision     Comment able to get to/from EOB briefly using bedrails on flattened bed surface yet w/o manual (A) or cues        Mobility Belt    Mobility Belt Used for All Out of Bed Activity no     Reason Mobility Belt Not Used medical contraindication     Reason Mobility Belt Not Used at a mod I level for xfers        Sit/Stand Transfer    Surface edge of bed     [place] modified independence     Transfer Comments demo'd G push off from EOB: timely, safely & w/o difficulty to get to EOB; demo'd safe reach back/controlled  descent onto EOB        Gait Training    [place], Gait distant supervision     Safety/Cues increased time to complete     Assistive Device none     Distance in Feet 60 feet     Pattern step-through     Deviations/Abnormal Patterns gait speed decreased     Comment (Gait/Stairs) slow, steady recipricol pattern w/o any overt LOB, able to converse & ambulate w/o difficulty        Balance    Static Sitting Balance WFL;supported;sitting, edge of bed     Dynamic Sitting Balance WFL;supported;sitting, edge of bed     Sit to Stand Dynamic Balance WFL     Static Standing Balance WFL;unsupported     Dynamic Standing Balance unsupported;mild impairment   no LOB       Impairments/Safety Issues    Impairments Affecting Function balance;endurance/activity tolerance     Functional Endurance Fair                                Education Documentation  Transfers, taught by Leslie Harp PT at 2/20/2024 12:43 PM.  Learner: Patient  Readiness: Eager  Method: Explanation, Demonstration  Response: Verbalizes Understanding, Demonstrated Understanding  Comment: Pt educated on the role of PT    Gait Training, taught by Leslie Harp PT at 2/20/2024 12:43 PM.  Learner: Patient  Readiness: Eager  Method: Explanation, Demonstration  Response: Verbalizes Understanding, Demonstrated Understanding  Comment: Pt educated on the role of PT    Bed Mobility, taught by Leslie Harp PT at 2/20/2024 12:43 PM.  Learner: Patient  Readiness: Eager  Method: Explanation, Demonstration  Response: Verbalizes Understanding, Demonstrated Understanding  Comment: Pt educated on the role of PT        Session Outcome  Patient supine, in bed, leg(s) elevated at end of session, bed alarm on, all needs met, call light in reach, personal items in reach. Nursing notified about patient's response to therapy/activity, patient's performance, and patient's position.    AM-PAC™ - Mobility (Current Function)     Turning form your back to your side while in  flat bed without using bedrails 3 - A Little   Moving from lying on your back to sitting on the side of a flat bed without using bedrails 3 - A Little   Moving to and from a bed to a chair 4 - None   Standing up from a chair using your arms 4 - None   To walk in a hospital room 3 - A Little   Climbing 3-5 steps with a railing 3 - A Little   AM-PAC™ Mobility Score 20      ASSESSMENT AND PLAN     Progress Summary  Pt is an 87 y/o female adm w/ n/v/d now resolved presents w/ mild balance/endurance deficits - currently at a (S) level for bed mobility, mod I for xfers and Dist S for amb w/o AD a household distance therefore rec return home w/ (A) & possible Home PT (if pt is agreeable).    Patient/Family Therapy Goals Statement: wants to return directly home today    PT Plan    Flowsheet Row Most Recent Value   Rehab Potential good, to achieve stated therapy goals at 02/20/2024 1212   Therapy Frequency 4 times/wk at 02/20/2024 1212   Planned Therapy Interventions balance training, bed mobility training, gait training, strengthening, transfer training at 02/20/2024 1212          PT Discharge Recommendations    Flowsheet Row Most Recent Value   PT Recommended Discharge Disposition home with assistance at 02/20/2024 1212   Anticipated Equipment Needs if Discharged Home (PT) none at 02/20/2024 1212               PT Goals    Flowsheet Row Most Recent Value   Bed Mobility Goal 1    Activity/Assistive Device bed mobility activities, all at 02/20/2024 1212   [place] independent at 02/20/2024 1212   Time Frame by discharge at 02/20/2024 1212   Progress/Outcome goal ongoing at 02/20/2024 1212   Transfer Goal 1    Activity/Assistive Device all transfers at 02/20/2024 1212   [place] modified independence at 02/20/2024 1212   Time Frame by discharge at 02/20/2024 1212   Progress/Outcome goal ongoing at 02/20/2024 1212   Gait Training Goal 1    Activity/Assistive Device gait (walking locomotion) at 02/20/2024 1212    [place] independent at 02/20/2024 1212   Distance 150 at 02/20/2024 1212   Time Frame by discharge at 02/20/2024 1212   Progress/Outcome goal ongoing at 02/20/2024 1212

## 2024-02-20 NOTE — ANESTHESIA PREPROCEDURE EVALUATION
Relevant Problems   CARDIOVASCULAR   (+) Aortic valve disease   (+) Essential hypertension   (+) Mitral valve disease   (+) Paroxysmal atrial fibrillation (CMS/HCC)      GASTROINTESTINAL   (+) Coffee ground emesis   (+) Gastrointestinal hemorrhage, unspecified gastrointestinal hemorrhage type      HEMATOLOGY   (+) Anemia associated with acute blood loss   (+) Chronic anticoagulation      URINARY SYSTEM   (+) Hypomagnesemia       Anesthesia ROS/MED HX      Cardiovascular   dyslipidemia   hypertension  Dysrhythmias and atrial fibrillation   Cardiac clearance reviewed and ECG reviewed  Comments: Reported intact per pt  GI/Hepatic   GI Bleeding   liver disease  Endo/Other  Body Habitus: Normal       Past Surgical History:   Procedure Laterality Date   • ABDOMINAL SURGERY      2019   • ADENOIDECTOMY Bilateral    • CHOLECYSTECTOMY  10/03/2011   • EYE SURGERY      2020    • GANGLION CYST EXCISION     • GASTRIC BYPASS     • TONSILLECTOMY         Physical Exam    Airway   Mallampati: III   TM distance: <3 FB   Neck ROM: full  Cardiovascular - normal   Rhythm: regular   Rate: normalPulmonary - normal   clear to auscultation  Other Findings   Reported intact per pt        Anesthesia Plan    Plan: MAC    Technique: MAC     Lines and Monitors: PIV     Airway: natural airway / supplemental oxygen       patient did not smoke on day of surgery   3 ASA  Blood Products:   Use of Blood Products Discussed: No   Anesthetic plan and risks discussed with: patient  Induction:    intravenous   Postop Plan:   Patient Disposition: phase II then home   Pain Management: IV analgesics

## 2024-02-20 NOTE — PROGRESS NOTES
Occupational Therapy -  Initial Evaluation     Patient: Shalini Kunz  Location: [facility] 0545  MRN: [id number]  Today's date: 2/20/2024    HISTORY OF PRESENT ILLNESS     Shalini is a 88 y.o. female admitted on 2/17/2024 with Dilated bile duct [K83.8]  Hypotension, unspecified hypotension type [I95.9]  Gastrointestinal hemorrhage, unspecified gastrointestinal hemorrhage type [K92.2]  Nausea and vomiting, unspecified vomiting type [R11.2]. Principal problem is Gastrointestinal hemorrhage, unspecified gastrointestinal hemorrhage type.    Past Medical History  Shalini has a past medical history of Anemia, Arthritis, Deep vein thrombosis (CMS/HCC), Hypertension, Lightheadedness, Lipid disorder, and Liver disease.    History of Present Illness   Pt is an 87 y/o female who presents with persistent n/v/d, reports her emesis and diarrhea were black in nature.    EGD & Colonoscopy (-) findings      PRIOR LEVEL OF FUNCTION AND LIVING ENVIRONMENT     Prior Level of Function    Flowsheet Row Most Recent Value   Dominant Hand right   Ambulation independent   Transferring independent   Toileting independent   Bathing independent   Dressing independent   Eating independent   IADLs independent   Communication understands/communicates without difficulty   Prior Level of Function Comment reports being Independent w/ all fxn'l/ADL tasks w/o AD - PTA   Assistive Device Currently Used at Home none        Prior Living Environment    Flowsheet Row Most Recent Value   People in Home spouse   Current Living Arrangements independent living facility   Home Accessibility wheelchair accessible   Living Environment Comment Lives w/  at [facility]        Occupational Profile    Flowsheet Row Most Recent Value   Occupational History/Life Experiences retired teacher   Environmental Supports and Barriers lives w/ spouse in [facility]        VITALS AND PAIN     OT Vitals    Date/Time Pulse SpO2 Pt Activity O2 Therapy BP BP Location BP  Method Pt Position [employer]   02/20/24 1144 76 100 % At rest None (Room air) 111/53 Right upper arm Automatic Lying SAP   02/20/24 1152 83 100 % At rest None (Room air) 120/59 Left upper arm Automatic Sitting SAP      OT Pain    Date/Time Rating: Rest Rating: Activity Who   02/20/24 1144 0 -- SAP   02/20/24 1152 -- 0 SAP           Objective   OBJECTIVE     Start time:  1141  End time:  1152  Session Length: 11 min  Mode of Treatment: occupational therapy, co-treatment (expedite dc)    General Observations  Patient received supine, in bed. She was agreeable to therapy, no issues or concerns identified by nurse prior to session. RN cleared pt for therapy    Precautions: fall              OT Eval and Treat - 02/20/24 1141        Cognition    Orientation Status oriented x 4     Affect/Mental Status WFL     Follows Commands WFL     Cognitive Function WFL     Comment, Cognition Pt pleasant and cooperative        Vision Assessment/Intervention    Vision Assessment corrective lenses for reading        Hearing Assessment    Hearing Status hearing aid, bilateral        Sensory Assessment    Sensory Assessment sensation intact, upper extremities        Upper Extremity Assessment    UE Assessment ROM and Strength WFL        Bed Mobility    Bed Mobility Activities supine to sit;sit to supine     [place] modified independence     Safety/Cues verbal cues     Assistive Device bed rails     Comment Performed w/ bed flat. Bed exit R to simulate home. No physical assist required        Mobility Belt    Mobility Belt Used for All Out of Bed Activity no     Reason Mobility Belt Not Used IND for func mobility        Sit/Stand Transfer    Surface edge of bed     [place] independent     Assistive Device crutches, axillary     Transfer Comments Performed to/from EOB w/o AD. Pt steady upon standing. Good eccentric lowering to bed        Functional Mobility    Distance in room/bathroom;hallway     Functional Mobility [place]  independent     Assistive Device none     Functional Mobility Comments Pt performed func mobility in room and hallway w/o physical (A) and w/o AD. No overt LOB noted        Lower Body Dressing    Tasks don;socks     [place] independent     Position edge of bed sitting     Comment good func reach to don socks        Balance    Static Sitting Balance WFL     Dynamic Sitting Balance WFL     Sit to Stand Dynamic Balance WFL     Static Standing Balance WFL     Dynamic Standing Balance WFL                                Education Documentation  Self-Care, taught by Alexandra Silva OT at 2/20/2024  4:16 PM.  Learner: Patient  Readiness: Acceptance  Method: Explanation  Response: Verbalizes Understanding  Comment: Discussed the role of OT, findings, mobility, adls, and safety.    Safety, taught by Alexandra Silva OT at 2/20/2024  4:16 PM.  Learner: Patient  Readiness: Acceptance  Method: Explanation  Response: Verbalizes Understanding  Comment: Discussed the role of OT, findings, mobility, adls, and safety.    Activity, taught by Alexandra Silva OT at 2/20/2024  4:16 PM.  Learner: Patient  Readiness: Acceptance  Method: Explanation  Response: Verbalizes Understanding  Comment: Discussed the role of OT, findings, mobility, adls, and safety.        Session Outcome  Patient reclined, in bed at end of session, bed alarm on, call light in reach, personal items in reach, all needs met. Nursing notified about patient's response to therapy/activity, patient's performance, and patient's position.    AM-PAC™ - ADL (Current Function)     Putting on/taking off regular lower body clothing 4 - None   Bathing 4 - None   Toileting 4 - None   Putting on/taking off regular upper body clothing 4 - None   Help for taking care of personal grooming 4 - None   Eating meals 4 - None   AM-PAC™ ADL Score 24      ASSESSMENT AND PLAN     Progress Summary  OT IE complete. Pt is an 88 y.o. F who reports her emesis and diarrhea  were black in nature. PTA pt IND w/o AD for ambulation and adls. She lives w/ spouse in [facility] w/ [facility]. Pt overall mod I and IND for bed mobility, STS, func mobility, and LBD. No further acute OT needs identified Dispo rec home w/ assist.    Patient/Family Therapy Goal Statement: to return home    OT Plan    Flowsheet Row Most Recent Value   Rehab Potential other (see comments)  [OT eval only] at 02/20/2024 1141   Therapy Frequency evaluation only at 02/20/2024 1141          OT Discharge Recommendations    Flowsheet Row Most Recent Value   OT Recommended Discharge Disposition home with assistance at 02/20/2024 1141   Anticipated Equipment Needs if Discharged Home (OT) none at 02/20/2024 1141

## 2024-02-20 NOTE — HOSPITAL COURSE
Shalini is a 88 y.o. female admitted on 2/17/2024 with Dilated bile duct [K83.8]  Hypotension, unspecified hypotension type [I95.9]  Gastrointestinal hemorrhage, unspecified gastrointestinal hemorrhage type [K92.2]  Nausea and vomiting, unspecified vomiting type [R11.2]. Principal problem is Gastrointestinal hemorrhage, unspecified gastrointestinal hemorrhage type.    Past Medical History  Shalini has a past medical history of Anemia, Arthritis, Deep vein thrombosis (CMS/HCC), Hypertension, Lightheadedness, Lipid disorder, and Liver disease.    History of Present Illness   Pt is an 89 y/o female who presents with persistent n/v/d, reports her emesis and diarrhea were black in nature.    EGD & Colonoscopy (-) findings

## 2024-02-20 NOTE — OP NOTE
_______________________________________________________________________________  Patient Name: Shalini Kunz              Procedure Date: 2/20/2024 6:49 AM  MRN: [id number]                     Account Number: [id number]  YOB: 1935              Age: 88  Gender: Female                        Note Status: Finalized  Attending MD: KATLYN PRINCE MD~NIKI,  _______________________________________________________________________________  Procedure:             Colonoscopy  Indications:           Melena, Acute post hemorrhagic anemia  Providers:             KATLYN PRINCE MD~NIKI (Doctor), BLADIMIR NEGRO DO~SRUTHI (Fellow), Amber Mireles  (Nurse), Luisa De La Garza, Staff Nurse (Nurse)  Referring MD:          DICKSON HAMPTON MD  Requesting Provider:  Medicines:             Monitored Anesthesia Care  Complications:         No immediate complications.  _______________________________________________________________________________  Procedure:             After I obtained informed consent, the scope was  passed under direct vision. Throughout the procedure,  the patient's blood pressure, pulse, and oxygen  saturations were monitored continuously. The  colonoscope was introduced through the anus and  advanced to the terminal ileum. The colonoscopy was  performed without difficulty. The patient tolerated  the procedure well. The quality of the bowel  preparation was excellent. The terminal ileum,  ileocecal valve, appendiceal orifice, and rectum were  photographed.  Estimated Blood Loss:  Estimated blood loss: none.  Findings:  Hemorrhoids were found on perianal exam.  The terminal ileum appeared normal.  Multiple small and large-mouthed diverticula were found in the sigmoid  colon and descending colon.  Non-bleeding internal hemorrhoids were found during retroflexion. The  hemorrhoids were Grade II (internal hemorrhoids that prolapse but reduce  spontaneously).  The exam was  otherwise without abnormality on direct and retroflexion  views.  Impression:            - Hemorrhoids found on perianal exam.  - The examined portion of the ileum was normal.  - Diverticulosis in the sigmoid colon and in the  descending colon.  - Non-bleeding internal hemorrhoids.  - The examination was otherwise normal on direct and  retroflexion views.  - No specimens collected.  - No blood or bleeding lesions identified.  Recommendation:        - [facility] patient to [facility].  - Resume regular diet.  - Resume Eliquis (apixaban) at prior dose today.  - The findings and recommendations were discussed with  the referring physician and patient.  Procedure Code(s):     --- Professional ---  69654, Colonoscopy, flexible; diagnostic, including  collection of specimen(s) by brushing or washing, when  performed (separate procedure)  Diagnosis Code(s):     --- Professional ---  K64.1, Second degree hemorrhoids  K92.1, Melena (includes Hematochezia)  D62, Acute posthemorrhagic anemia  K57.30, Diverticulosis of large intestine without  perforation or abscess without bleeding  CPT copyright 2021 American Medical Association. All rights reserved.  The codes documented in this report are preliminary and upon  review may  be revised to meet current compliance requirements.  Katlyn Harden MD  _____________________________________  KATLYN HARDEN MD~NIKI  2/20/2024 8:48:20 AM  This report has been signed electronically.  Number of Addenda: 0  Note Initiated On: 2/20/2024 6:49 AM

## 2024-02-20 NOTE — DISCHARGE INSTRUCTIONS
Dear Ms. Ze,    You came to the [facility] on 2/17 for evaluation of bloody vomiting and diarrhea. In the Emergency Room your blood pressure was very low and we were worried you may be bleeding from your GI tract. You were admitted to the hospital for further management.     During your time in the hospital you underwent a scope of your upper and lower GI tract, both negative for a bleed. GI doctors determined that you were safe to discharge home with follow up from your PCP to recheck your blood work with consideration for a GI capsule study to further image your GI tract should you have symptoms again.     Please bring this discharge paperwork to all of your follow up appointments.    Please see your After Visit Summary for changes to your medications.     Follow Up  When you leave the hospital it is important that you follow up with the following providers:  - Your Primary Care provider, to discuss your recent hospitalization (recheck CBC, Obtain MRI/MRCP WWO contrast as an outpatient given both biliary and PD dilation on CT, but suspect secondary to surgery)    If you experience any of the following, please notify your doctor or come to the Emergency Room:  - Worsening of your symptoms   - Headache with vision changes or weakness  - Shortness of breath or chest pain that does not go away    It was a pleasure treating you at [facility] and we wish you all the best!

## 2024-02-21 ENCOUNTER — PATIENT OUTREACH (OUTPATIENT)
Dept: CASE MANAGEMENT | Facility: CLINIC | Age: 89
End: 2024-02-21
Payer: MEDICARE

## 2024-02-21 LAB
CROSSMATCH: NORMAL
ISBT CODE: 6200
PRODUCT CODE: NORMAL
PRODUCT STATUS: NORMAL
SPECIMEN EXP DATE BLD: NORMAL
UNIT ABO: NORMAL
UNIT ID: NORMAL
UNIT RH: POSITIVE

## 2024-02-21 ASSESSMENT — ENCOUNTER SYMPTOMS
CONSTIPATION: 0
DIARRHEA: 1
BLOOD IN STOOL: 0
VOMITING: 0
RECTAL PAIN: 0
DIZZINESS: 1
DIFFICULTY URINATING: 0
SHORTNESS OF BREATH: 0
WEAKNESS: 1
LIGHT-HEADEDNESS: 1

## 2024-02-21 NOTE — PROGRESS NOTES
02/21/2024 10:39 AM EST by Porsche Salguero, RN 02/21/2024 10:39 AM EST by Porsche Salguero, RN  Outgoing Shalini Kunz ([facility]) [phone] (Mobile)   Left MessageCommunicated - NEELIMA # 1

## 2024-02-21 NOTE — PROGRESS NOTES
NAME: Shalini Kunz    MRN: [id number]    YOB: 1935    Event Review:    Initial TCM Patient Outreach Date: 02/21/24    Assessment completed with: Patient  Patient stated reason for hospitalization: Patient presented with vomiting and diarrhea. Hgb 10.5 down from 14 on 2/2. She admitted to recent travel flying to [place] then a 16 day cruise to [place] about a month ago. Also had Covid about 3 weeks ago. She was admitted and underwent an UGI and a colonoscopy in [facility] and did not show active bleed. She does have a prior hx of GIB. HX of afib and was given OK to resume Eliquis at d/c home. CM called and spoke to patient. She states she is having diarrhea again. She is not seeing any blood. She admits to being weak. Stressed importance of eating BRAT diet to help diarrhea and water to replace fluids lost in diarrhea and she states understanding. Reviewed hosp and d/c instructions. Patient states she schedules at [facility] with PCP. CM called [facility] and scheduled her PCP f/u for 2/27. CM called patient back and left a message with aptp scheduling. Left arm is a little sore and sl swollen from mult sticks. She states it does not appear to be red or infected and she will keep an eye on it. She put a call into nurse at [facility] who came to see her last night and coming back today. Patient is managing own meds. lives with . Phone number for [facility] provided to get scheduled.     Discharge Diagnosis: GIB    Patient readmitted in the last 30 days: No  Discharging Facility: [facility]  Date of Last Admission: 02/17/24  Date of Last Discharge: 02/20/24       Patient's perception of their health status since discharge: Improving        Review of Systems   Respiratory: Negative for shortness of breath.    Cardiovascular: Negative for chest pain.   Gastrointestinal: Positive for diarrhea. Negative for blood in stool, constipation, rectal pain and vomiting.   Genitourinary: Negative  for difficulty urinating.   Skin:        Left arm sl sore and swollen from mult sticks   Neurological: Positive for dizziness (chronic), weakness and light-headedness (chronic).       Medication Review:    Medication Review: Yes     Reported by: Patient  Any new medications prescribed at discharge?: No  Is the patient having any side effects they believe may be caused by any medication additions or changes?: No        Do you have enough of your regularly prescribed medications?: Yes       Medication adherence problem?: No  Was a medication discrepancy indentified?: No       Nursing Interventions: Nurse provided patient education  Reconciled the current and discharge medications: Yes  Reviewed AVS (Discharge Instructions)?: Yes         Acute Pain:    Acute pain: No                Chronic Pain:    Chronic pain: No                Diet/Nutrition:    Type of Diet: Regular (encouraged BRAT diet)             Home Care Services:       Type of Home Care Services:  (she has nurses at [facility] coming to see her.)          Post-Discharge Durable Medical Equipment::    Durable Medical Equipment: Grab bars  Does patient's condition require a scale?:  (Does not weigh daily)           Home Management:    Living Arrangement: Spouse  Support System:: Spouse ([facility] staff if needed)  Type of Residence: Independent Living Facility     Any patient reported falls in the last 3 months?: No  Pentecostal or spiritual beliefs that impact treatment?: No        Appointment Scheduling:    PCP appointment scheduled: No      Patient Scheduling Dispositions:  (Lives at [facility])         Follow-Ups:    [facility]: TBS    Cardiac: Dr Jose: 5/22            Interventions/ Care Coordination:    Interventions/ Care Coordination: Encouraged patient to schedule with the PCP/Specialist, Addressed a knowledge deficit, Assisted patient in the scheduling of an appointment, Provided resources to facilitate adherence  General Education: Falls and home safety  precautions, Nutrition and hydration instructions     Initiated Care Plan: NEELIMA          Reviewed signs/symptoms of worsening condition or complication that necessitate a call to the Physician's office.  Educated patient on access to care.  RN phone number given for future care management.      LUDIN WashingtonN, RN  Ambulatory Care Manager  [phone]

## 2024-02-22 ENCOUNTER — OFFICE VISIT (OUTPATIENT)
Dept: GASTROENTEROLOGY | Facility: HOSPITAL | Age: 89
End: 2024-02-22
Payer: MEDICARE

## 2024-02-22 ENCOUNTER — LAB REQUISITION (OUTPATIENT)
Dept: LAB | Facility: HOSPITAL | Age: 89
End: 2024-02-22
Attending: FAMILY MEDICINE
Payer: MEDICARE

## 2024-02-22 VITALS
OXYGEN SATURATION: 100 % | DIASTOLIC BLOOD PRESSURE: 63 MMHG | WEIGHT: 148 LBS | BODY MASS INDEX: 23.78 KG/M2 | SYSTOLIC BLOOD PRESSURE: 139 MMHG | TEMPERATURE: 96.8 F | HEIGHT: 66 IN | HEART RATE: 85 BPM | RESPIRATION RATE: 17 BRPM

## 2024-02-22 DIAGNOSIS — K92.2 GASTROINTESTINAL HEMORRHAGE, UNSPECIFIED: ICD-10-CM

## 2024-02-22 DIAGNOSIS — R19.7 DIARRHEA, UNSPECIFIED: ICD-10-CM

## 2024-02-22 DIAGNOSIS — K83.8 COMMON BILE DUCT DILATATION: ICD-10-CM

## 2024-02-22 DIAGNOSIS — K92.2 ACUTE UPPER GI BLEED: Primary | ICD-10-CM

## 2024-02-22 PROCEDURE — 99900024 HB NONBILLABLE HOSPITAL CLINIC SERVICE: Performed by: STUDENT IN AN ORGANIZED HEALTH CARE EDUCATION/TRAINING PROGRAM

## 2024-02-22 RX ORDER — OMEPRAZOLE 40 MG/1
40 CAPSULE, DELAYED RELEASE ORAL DAILY
Qty: 90 CAPSULE | Refills: 3 | Status: SHIPPED | OUTPATIENT
Start: 2024-02-22 | End: 2024-02-22 | Stop reason: HOSPADM

## 2024-02-22 RX ORDER — OMEPRAZOLE 40 MG/1
40 CAPSULE, DELAYED RELEASE ORAL DAILY
Qty: 90 CAPSULE | Refills: 3 | Status: SHIPPED | OUTPATIENT
Start: 2024-02-22 | End: 2024-06-28 | Stop reason: SDUPTHER

## 2024-02-22 NOTE — PROGRESS NOTES
Gastroenterology Clinic Note       SUBJECTIVE   Ms Kunz is a 88 y.o female with pmh of HTN, HLD, pAF (on Eliquis), hx of pyloric stenosis (s/p serial TTS balloon dilations several years ago 2019 with eventual surgery - gastrojejunostomy?), and recent hospitalization at [facility] (2/17 - 2/20/24) with an acute UGIB (unclear etiology) who presents to the GI clinic for follow-up after her recent hospitalization.    Patient initially presented to [facility] on 2/17/2024 with coffee ground emesis and melena concerning for an upper GI bleed. Patient states was otherwise in her USOH until one evening prior to her admission when she developed episodes of black-tarry loose stool consistent with melena and several episodes coffee ground emesis. Denies any previous or similar symptoms in the past. Denies any blood in her stools or overt blood in her vomit. No prior history of GI bleeding in the past. She denied any reported NSAIDs or significant EtOH use, although does report occasional wine with meals at dinner. States she had three-to-four episodes of dark black vomit without any blood although states she didn't see all of it as her  was helping her clean up. Denies any abdominal pain or preceding nausea/vomiting prior to this. She is on low dose eliquis for her A Fib but no other antiplatelets or aspirin. Given her symptoms, she presented to the hospital via EMS. She was found to have melena with trace blood in the ED where she was found to have soft blood pressures and mildly tachycardic with HR 100s. Labs were unrevealing of ILDEFONSO, BUN 69 (with nml Crt 0.8) and presented with a Hgb 10.5 -> 10s (from a baseline 13-14s). A CT scan was performed and revealed moderate bile duct dilatation and mild PD dilation of indeterminate acuity and gastrojejunal anastomosis at the proximal gastric body greater curvature but otherwise wnl. Her Hgb dropped from 10s to 8.7 and concern for ongoing melena where she was transfused 1  uPRBC and started on IV PPI gtt.     She underwent an eventual EGD (as detailed below) which was grossly unremarkable except for an erythematous red spot at the site of her anastomosis (gastrojejunostomy ?). Given her anemia and melena, she had underwent a colonoscopy one day later which again was grossly unrevealing including a normal TI and scattered tics but without any old or fresh blood throughout her examined colon. Ultimately, she was without any further bloody stools and only received 1 uPRBC during that admission. Her apixaban was restarted and her Hgb 8.9 at time of discharge.    Today she presents with her family (with her , Phoenix, and her daughter Grace) at this visit. She reports feeling well without any further dark or overtly bloody stools and having normal brown bowel movements without any abdominal pain. Denies any symptoms concerning for symptomatic anemia. She has yet to start taking her Omeprazole as prescribed but has been taking her Eliquis without any further episodes of bleeding. Working on getting her appetite and strength back but denies any weight loss or nausea/vomiting. She does express some concern as the cause of her bleed was unclear and she was never told what she may have bled from. She continues to abstain from NSAIDs and denies any chronic NSAID prior to this.     In regards to her GI history, she denies any previous history of GI bleeding in the past. Reports a prior history of pyloric stenosis where she received previous upper endoscopies in [place] (via [facility]) with her last EGD as detailed below. States she eventual underwent surgery for correction a few years ago but unable to recall any details (possibly gastrojejunostomy?). States her symptoms resolved after her surgery and did quite well. Reports her last EGD was in 2019 and her screening colonoscopy was around that same time about 4-5 years ago.       Pertinent GI Records / Data Review:    EGD (coffee ground  emesis, melena) 2/19/204: Normal esophagus, red spot at anastomosis (unclear what type of surgery), normal stomach, normal duodenum, without evidence of active bleeding    Colonoscopy (melena, acute anemia) 2/20/2024:   - Hemorrhoids found on perianal exam. - The examined portion of the ileum was normal. - Diverticulosis in the sigmoid colon and in the descending colon. - Non-bleeding internal hemorrhoids. - The examination was otherwise normal on direct and retroflexion views. - No specimens collected. - No blood or bleeding lesions identified.    CT Abd/pelvis w/ IV contrast 2/17/2024- Impression:  1.  Moderate bile duct dilation and mild pancreatic duct dilation, of  indeterminate acuity given absence of prior imaging. MRI/MRCP without and with  intravenous contrast can be considered if further imaging evaluation is desired.  2.  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 TTS esophageal or LAMS)     Social Hx:  - EtOH: Social occasions, glass of wine  - Smoking: Denies    Family Hx: Denies any family history of colon, pancreatic or other known GI malignancy    Care Team:  - PCP: Dr. Renée Pittman, DO     REVIEW OF SYSTEMS   Review of Systems  12 point ROS was otherwise negative except for those stated above per HPI     MEDICATIONS        Current Outpatient Medications:   •  atorvastatin (LIPITOR) 10 mg tablet, TAKE 1/2 TABLET  (5 MG TOTAL) BY MOUTH DAILY., Disp: 45 tablet, Rfl: 0  •  calcium carbonate-vitamin D3 (CALCIUM 600 WITH VITAMIN D3) 600 mg(1,500mg) -400 unit tablet,chewable, Take 600 mg by mouth once., Disp: , Rfl:   •  cholecalciferol, vitamin D3, 1,000 unit (25 mcg) tablet,  "Take 1 tablet (1,000 Units total) by mouth daily., Disp: 90 tablet, Rfl: 3  •  cyanocobalamin 1,000 mcg tablet, Take 1 tablet (1,000 mcg total) by mouth daily., Disp: 90 tablet, Rfl: 3  •  ELIQUIS 2.5 mg tablet, TAKE 1 TABLET BY MOUTH  TWICE DAILY, Disp: 180 tablet, Rfl: 3  •  metoprolol succinate XL (TOPROL-XL) 25 mg 24 hr tablet, Take 1 tablet (25 mg total) by mouth daily., Disp: 30 tablet, Rfl: 0  •  multivitamin capsule, Take 1 capsule by mouth daily., Disp: , Rfl:   •  omeprazole 40 mg capsule, Take 40 mg by mouth daily., Disp: , Rfl:   •  SUMAtriptan (IMITREX) 50 mg tablet, Take 1 tablet (50 mg total) by mouth once as needed for migraine., Disp: 30 tablet, Rfl: 3    PHYSICAL EXAMINATION   Vital signs:   Visit Vitals  /63 (BP Location: Right upper arm, Patient Position: Sitting)   Pulse 85   Temp (!) 36 °C (96.8 °F) (Temporal)   Resp 17   Ht 1.676 m (5' 6\")   Wt 67.1 kg (148 lb)   SpO2 100%   BMI 23.89 kg/m²     Physical Exam  General: Comfortable appearing, elderly female, in no acute distress; non-toxic appearing  Eyes: No scleral icterus; pink conjunctivae  HENT: No oropharyngeal lesions; MMM  Cardiac: No JVD  Lungs: Normal work of breathing and effort on room air  Abdomen: Soft, NTND; no tenderness on palpation; no guarding or rebound tenderness  Extremities: Warm and well-perfused  Skin: No jaundice  Neuro: Grossly non-focal; moves all four extremities spontaneously  Psych: Normal mood and affect; pleasant and cooperative     LABS / IMAGING/STUDIES      Labs Reviewed:   Lab Results   Component Value Date    GLUCOSE 95 02/20/2024    CALCIUM 8.1 (L) 02/20/2024     02/20/2024    K 3.9 02/20/2024    CO2 22 02/20/2024     (H) 02/20/2024    BUN 7 02/20/2024    CREATININE 0.6 02/20/2024     Lab Results   Component Value Date    WBC 4.24 02/20/2024    HGB 8.9 (L) 02/20/2024    HCT 27.3 (L) 02/20/2024    MCV 96.1 02/20/2024     (L) 02/20/2024     No results found for: " "\"HEPCAB\"    Studies/Imaging Reviewed: As detailed above         ASSESSMENT AND PLAN   Problem List Items Addressed This Visit   Ms Kunz is a 88 y.o female with pmh of HTN, HLD, pAF (on Eliquis), hx of pyloric stenosis (s/p serial TTS balloon dilations several years ago 2019 with eventual surgery - gastrojejunostomy?), and recent hospitalization at [facility] (2/17 - 2/20/24) with an acute UGIB (unclear etiology) who presents to the GI clinic for follow-up after her recent hospitalization.       #GI Hemorrhage #Non-variceal UGIB  #Eythematous Red Spot at GJ Anastomosis  #Acute Blood Loss Anemia  Impression: Patient presented with a recent acute, non-variceal UGIB given her multiple episodes of coffee ground emesis and melena on admission at [facility] along with a markedly elevated BUN 69 (w/ nml Crt 0.8) and Hgb 10s -> 8s (from a baseline 13-14s). There is almost no question that this was an UGIB given her overall presentation and symptomatology. Unfortunately, her EGD was grossly unrevealing however suspect patient may have had a Dieulafoy's lesion given her overall presentation and given the red, protruded erythematous red spot visualized near her anastomosis within her stomach at the time of her endoscopy which may have represented an underlying arterial vessel (ie Dieulafoy's lesion). Suspect this is likely the source of her acute GI bleed and fortunately she has not had re-bleeding since her hospitalization. Other etiologies may include an anastomotic ulcer that may have been missed (and/or not visualized) vs gastric erosion. Otherwise, her presentation is not consistent with AVM-related bleeding given the acuity of her overall presentation and further her iron studies do not support this nor do her previous CBCs. Much less likely an occult SB lesion (ie polyp and/or mass) as again she is without any chronic anemia while on her anticoagulation. Thus, I think the utility of a VCE is very low yield and further would not be " without risks as it's unclear what type of surgical operation she had (suspect gastrojejunal anastomosis) but cannot rule out a blind limb and concern for retained capsule putting her at risk for either another endoscopic intervention and/or possible surgery. As patient is without any further bloody stools, chronic anemia (while on a/c) favor conservative management with ongoing PPI and monitoring her counts while she remains on anticoagulation  Plan:  - Sent new prescription for Omeprazole 40 mg once daily and sent to pharmacy. Favor indefinite PPI therapy given patient's recent severe UGIB, age, and while she remains on anticoagulation  - Her daughter did inquire about side effects about long-term PPI and further advised that a lot of the data represents correlation and not causation. There is a small risk for enteric GI infections (ie C Diff), however we still need further data and studies given previous concern for other possible side effects (bone loss, memory impairment, etc)  - For reasons as above, will defer pursuing a VCE at this time as this does not represent an AVM-related bleed and much less likely an occult SB lesion. However, if her Hgb drifts and/or concern for re-bleeding would consider an endoscopically placed VCE to r/o SB pathology for further evaluation and altered surgical anatomy (possible Bilroth ? Vs gastrojejunostomy)  - Ordered CBC and advised patient to obtain in 2 weeks to assess her Hgb  - Based on her iron studies, she does not require iron supplementation and without ILDEFONSO  - Advised to monitor for recurrent bloody stools given concern for Dieulafoy's lesion as these may recur especially while being on Eliquis  - Okay to continue Eliquis for her paroxsymal A Fib from a GI standpoint, however if she were to rebleed in the future would consider further risks vs benefits discussion  - If patient develops recurrent melena, bloody stools, and/or coffee ground emesis advised close ED return  precautions  - Advised strict avoidance of all NSAIDs  - No plans for any endoscopic evaluation at this time  - Will have patient follow-up with me in about 4 months to ensure no further recurrent bleeding      #Abnormal CT Imaging  #Incidental Moderate Biliary Duct Dilation and #Mild PD Dilation  Seen on CT Abd/pelvis with moderate intrahepatic and extrahepatic bile duct dilation with CBD measuring 1.2 cm and PD up to 0.5 cm without any discrete lesions or masses. Possibly in setting of prior CCY along with history of prior surgery (gastrojejunal anastomosis) and patient's age. Not on chronic opioids. LFTs wnl along with T Bili. No other red flag or other concerning symptoms (ie weight loss).  Recs:  - Suspect this is related to her surgery and age, however given her ductal dilatation of both her biliary tree and PD, will obtain an MRI/MRCP WWO contrast to exclude ampullary lesion and/or pancreatic mass  - Ordered BMP to assess renal function prior to contrast      #CRC Screening  Recent colonoscopy during her hospitalization for a GI bleed on 2/20/2024 without any polyps or lesions, only noting a normal TI, few tics in sigmoid/descending, and hemorrhoids. No family hx of CRC or personal history of high-risk adenomas  - No indication for ongoing CRC screening given patient's age (88 y.o)         RTC: In 4 months      Patient seen and examined. Plan discussed with Gastroenterology attending, Dr. Martinez.      Emir Rhoades DO  Gastroenterology Fellow, PGY-VI  Pager [phone]         Emir Rhoades DO  02/22/24  2:17 PM

## 2024-02-22 NOTE — LETTER
February 22, 2024     Renée Pittman DO  [address] [facility] [address]  [facility] [address]    Patient: Shalini Kunz  YOB: 1935  Date of Visit: 2/22/2024      Dear Dr. Pittman:    Thank you for referring Shalini Kunz to me for evaluation. Below are my notes for this consultation.    If you have questions, please do not hesitate to call me. I look forward to following your patient along with you.         Sincerely,        Emir Rhoades DO        CC: No Recipients  Emir Rhoades DO  2/22/2024  6:58 PM  Sign when Signing Visit     Gastroenterology Clinic Note       SUBJECTIVE   Ms Kunz is a 88 y.o female with pmh of HTN, HLD, pAF (on Eliquis), hx of pyloric stenosis (s/p serial TTS balloon dilations several years ago 2019 with eventual surgery - gastrojejunostomy?), and recent hospitalization at [facility] (2/17 - 2/20/24) with an acute UGIB (unclear etiology) who presents to the GI clinic for follow-up after her recent hospitalization.    Patient initially presented to [facility] on 2/17/2024 with coffee ground emesis and melena concerning for an upper GI bleed. Patient states was otherwise in her USOH until one evening prior to her admission when she developed episodes of black-tarry loose stool consistent with melena and several episodes coffee ground emesis. Denies any previous or similar symptoms in the past. Denies any blood in her stools or overt blood in her vomit. No prior history of GI bleeding in the past. She denied any reported NSAIDs or significant EtOH use, although does report occasional wine with meals at dinner. States she had three-to-four episodes of dark black vomit without any blood although states she didn't see all of it as her  was helping her clean up. Denies any abdominal pain or preceding nausea/vomiting prior to this. She is on low dose eliquis for her A Fib but no other antiplatelets or aspirin. Given her symptoms, she presented to the hospital via EMS. She was  found to have melena with trace blood in the ED where she was found to have soft blood pressures and mildly tachycardic with HR 100s. Labs were unrevealing of ILDEFONSO, BUN 69 (with nml Crt 0.8) and presented with a Hgb 10.5 -> 10s (from a baseline 13-14s). A CT scan was performed and revealed moderate bile duct dilatation and mild PD dilation of indeterminate acuity and gastrojejunal anastomosis at the proximal gastric body greater curvature but otherwise wnl. Her Hgb dropped from 10s to 8.7 and concern for ongoing melena where she was transfused 1 uPRBC and started on IV PPI gtt.     She underwent an eventual EGD (as detailed below) which was grossly unremarkable except for an erythematous red spot at the site of her anastomosis (gastrojejunostomy ?). Given her anemia and melena, she had underwent a colonoscopy one day later which again was grossly unrevealing including a normal TI and scattered tics but without any old or fresh blood throughout her examined colon. Ultimately, she was without any further bloody stools and only received 1 uPRBC during that admission. Her apixaban was restarted and her Hgb 8.9 at time of discharge.    Today she presents with her family (with her , Phoenix, and her daughter Grace) at this visit. She reports feeling well without any further dark or overtly bloody stools and having normal brown bowel movements without any abdominal pain. Denies any symptoms concerning for symptomatic anemia. She has yet to start taking her Omeprazole as prescribed but has been taking her Eliquis without any further episodes of bleeding. Working on getting her appetite and strength back but denies any weight loss or nausea/vomiting. She does express some concern as the cause of her bleed was unclear and she was never told what she may have bled from. She continues to abstain from NSAIDs and denies any chronic NSAID prior to this.     In regards to her GI history, she denies any previous history of GI  bleeding in the past. Reports a prior history of pyloric stenosis where she received previous upper endoscopies in [place] (via [email or web address]) with her last EGD as detailed below. States she eventual underwent surgery for correction a few years ago but unable to recall any details (possibly gastrojejunostomy?). States her symptoms resolved after her surgery and did quite well. Reports her last EGD was in 2019 and her screening colonoscopy was around that same time about 4-5 years ago.       Pertinent GI Records / Data Review:    EGD (coffee ground emesis, melena) 2/19/204: Normal esophagus, red spot at anastomosis (unclear what type of surgery), normal stomach, normal duodenum, without evidence of active bleeding    Colonoscopy (melena, acute anemia) 2/20/2024:   - Hemorrhoids found on perianal exam. - The examined portion of the ileum was normal. - Diverticulosis in the sigmoid colon and in the descending colon. - Non-bleeding internal hemorrhoids. - The examination was otherwise normal on direct and retroflexion views. - No specimens collected. - No blood or bleeding lesions identified.    CT Abd/pelvis w/ IV contrast 2/17/2024- Impression:  1.  Moderate bile duct dilation and mild pancreatic duct dilation, of  indeterminate acuity given absence of prior imaging. MRI/MRCP without and with  intravenous contrast can be considered if further imaging evaluation is desired.  2.  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 TTS esophageal or LAMS)     Social Hx:  - EtOH: Social occasions, glass of wine  - Smoking: Denies    Family Hx: Denies any family history of colon, pancreatic or other known GI  "malignancy    Care Team:  - PCP: Dr. Renée Pittman DO     REVIEW OF SYSTEMS   Review of Systems  12 point ROS was otherwise negative except for those stated above per HPI     MEDICATIONS        Current Outpatient Medications:   •  atorvastatin (LIPITOR) 10 mg tablet, TAKE 1/2 TABLET  (5 MG TOTAL) BY MOUTH DAILY., Disp: 45 tablet, Rfl: 0  •  calcium carbonate-vitamin D3 (CALCIUM 600 WITH VITAMIN D3) 600 mg(1,500mg) -400 unit tablet,chewable, Take 600 mg by mouth once., Disp: , Rfl:   •  cholecalciferol, vitamin D3, 1,000 unit (25 mcg) tablet, Take 1 tablet (1,000 Units total) by mouth daily., Disp: 90 tablet, Rfl: 3  •  cyanocobalamin 1,000 mcg tablet, Take 1 tablet (1,000 mcg total) by mouth daily., Disp: 90 tablet, Rfl: 3  •  ELIQUIS 2.5 mg tablet, TAKE 1 TABLET BY MOUTH  TWICE DAILY, Disp: 180 tablet, Rfl: 3  •  metoprolol succinate XL (TOPROL-XL) 25 mg 24 hr tablet, Take 1 tablet (25 mg total) by mouth daily., Disp: 30 tablet, Rfl: 0  •  multivitamin capsule, Take 1 capsule by mouth daily., Disp: , Rfl:   •  omeprazole 40 mg capsule, Take 40 mg by mouth daily., Disp: , Rfl:   •  SUMAtriptan (IMITREX) 50 mg tablet, Take 1 tablet (50 mg total) by mouth once as needed for migraine., Disp: 30 tablet, Rfl: 3    PHYSICAL EXAMINATION   Vital signs:   Visit Vitals  /63 (BP Location: Right upper arm, Patient Position: Sitting)   Pulse 85   Temp (!) 36 °C (96.8 °F) (Temporal)   Resp 17   Ht 1.676 m (5' 6\")   Wt 67.1 kg (148 lb)   SpO2 100%   BMI 23.89 kg/m²     Physical Exam  General: Comfortable appearing, elderly female, in no acute distress; non-toxic appearing  Eyes: No scleral icterus; pink conjunctivae  HENT: No oropharyngeal lesions; MMM  Cardiac: No JVD  Lungs: Normal work of breathing and effort on room air  Abdomen: Soft, NTND; no tenderness on palpation; no guarding or rebound tenderness  Extremities: Warm and well-perfused  Skin: No jaundice  Neuro: Grossly non-focal; moves all four extremities " "spontaneously  Psych: Normal mood and affect; pleasant and cooperative     LABS / IMAGING/STUDIES      Labs Reviewed:   Lab Results   Component Value Date    GLUCOSE 95 02/20/2024    CALCIUM 8.1 (L) 02/20/2024     02/20/2024    K 3.9 02/20/2024    CO2 22 02/20/2024     (H) 02/20/2024    BUN 7 02/20/2024    CREATININE 0.6 02/20/2024     Lab Results   Component Value Date    WBC 4.24 02/20/2024    HGB 8.9 (L) 02/20/2024    HCT 27.3 (L) 02/20/2024    MCV 96.1 02/20/2024     (L) 02/20/2024     No results found for: \"HEPCAB\"    Studies/Imaging Reviewed: As detailed above         ASSESSMENT AND PLAN   Problem List Items Addressed This Visit   Ms Kunz is a 88 y.o female with pmh of HTN, HLD, pAF (on Eliquis), hx of pyloric stenosis (s/p serial TTS balloon dilations several years ago 2019 with eventual surgery - gastrojejunostomy?), and recent hospitalization at [facility] (2/17 - 2/20/24) with an acute UGIB (unclear etiology) who presents to the GI clinic for follow-up after her recent hospitalization.       #GI Hemorrhage #Non-variceal UGIB  #Eythematous Red Spot at GJ Anastomosis  #Acute Blood Loss Anemia  Impression: Patient presented with a recent acute, non-variceal UGIB given her multiple episodes of coffee ground emesis and melena on admission at [facility] along with a markedly elevated BUN 69 (w/ nml Crt 0.8) and Hgb 10s -> 8s (from a baseline 13-14s). There is almost no question that this was an UGIB given her overall presentation and symptomatology. Unfortunately, her EGD was grossly unrevealing however suspect patient may have had a Dieulafoy's lesion given her overall presentation and given the red, protruded erythematous red spot visualized near her anastomosis within her stomach at the time of her endoscopy which may have represented an underlying arterial vessel (ie Dieulafoy's lesion). Suspect this is likely the source of her acute GI bleed and fortunately she has not had re-bleeding since her " hospitalization. Other etiologies may include an anastomotic ulcer that may have been missed (and/or not visualized) vs gastric erosion. Otherwise, her presentation is not consistent with AVM-related bleeding given the acuity of her overall presentation and further her iron studies do not support this nor do her previous CBCs. Much less likely an occult SB lesion (ie polyp and/or mass) as again she is without any chronic anemia while on her anticoagulation. Thus, I think the utility of a VCE is very low yield and further would not be without risks as it's unclear what type of surgical operation she had (suspect gastrojejunal anastomosis) but cannot rule out a blind limb and concern for retained capsule putting her at risk for either another endoscopic intervention and/or possible surgery. As patient is without any further bloody stools, chronic anemia (while on a/c) favor conservative management with ongoing PPI and monitoring her counts while she remains on anticoagulation  Plan:  - Sent new prescription for Omeprazole 40 mg once daily and sent to pharmacy. Favor indefinite PPI therapy given patient's recent severe UGIB, age, and while she remains on anticoagulation  - Her daughter did inquire about side effects about long-term PPI and further advised that a lot of the data represents correlation and not causation. There is a small risk for enteric GI infections (ie C Diff), however we still need further data and studies given previous concern for other possible side effects (bone loss, memory impairment, etc)  - For reasons as above, will defer pursuing a VCE at this time as this does not represent an AVM-related bleed and much less likely an occult SB lesion. However, if her Hgb drifts and/or concern for re-bleeding would consider an endoscopically placed VCE to r/o SB pathology for further evaluation and altered surgical anatomy (possible Bilroth ? Vs gastrojejunostomy)  - Ordered CBC and advised patient to  obtain in 2 weeks to assess her Hgb  - Based on her iron studies, she does not require iron supplementation and without ILDEFONSO  - Advised to monitor for recurrent bloody stools given concern for Dieulafoy's lesion as these may recur especially while being on Eliquis  - Okay to continue Eliquis for her paroxsymal A Fib from a GI standpoint, however if she were to rebleed in the future would consider further risks vs benefits discussion  - If patient develops recurrent melena, bloody stools, and/or coffee ground emesis advised close ED return precautions  - Advised strict avoidance of all NSAIDs  - No plans for any endoscopic evaluation at this time  - Will have patient follow-up with me in about 4 months to ensure no further recurrent bleeding      #Abnormal CT Imaging  #Incidental Moderate Biliary Duct Dilation and #Mild PD Dilation  Seen on CT Abd/pelvis with moderate intrahepatic and extrahepatic bile duct dilation with CBD measuring 1.2 cm and PD up to 0.5 cm without any discrete lesions or masses. Possibly in setting of prior CCY along with history of prior surgery (gastrojejunal anastomosis) and patient's age. Not on chronic opioids. LFTs wnl along with T Bili. No other red flag or other concerning symptoms (ie weight loss).  Recs:  - Suspect this is related to her surgery and age, however given her ductal dilatation of both her biliary tree and PD, will obtain an MRI/MRCP WWO contrast to exclude ampullary lesion and/or pancreatic mass  - Ordered BMP to assess renal function prior to contrast      #CRC Screening  Recent colonoscopy during her hospitalization for a GI bleed on 2/20/2024 without any polyps or lesions, only noting a normal TI, few tics in sigmoid/descending, and hemorrhoids. No family hx of CRC or personal history of high-risk adenomas  - No indication for ongoing CRC screening given patient's age (88 y.o)         RTC: In 4 months      Patient seen and examined. Plan discussed with Gastroenterology  attending, Dr. Martinez.      Emir Rhoades DO  Gastroenterology Fellow, PGY-VI  Pager [phone]         Emir Rhoades DO  02/22/24  2:17 PM

## 2024-02-22 NOTE — PATIENT INSTRUCTIONS
It was nice meeting you in the GI clinic!  Please get your CBC within 2-4 weeks, I will call you with the results.  Start Omeprazole 40 mg once daily while you are on your Eliquis along with your history of a GI bleed.  If you develop recurrent bloody / dark black stools, emesis or other concern please go to your nearest emergency room  Make a follow up appointment with me in 4 months.  If you have any questions or concerns, feel free to call me at [phone].    Sincerely,    Dr. Emir Rhoades, DO  Department of Gastroenterology

## 2024-02-23 ENCOUNTER — PATIENT OUTREACH (OUTPATIENT)
Dept: CASE MANAGEMENT | Facility: CLINIC | Age: 89
End: 2024-02-23
Payer: MEDICARE

## 2024-02-23 NOTE — PROGRESS NOTES
02/23/2024 02:54 PM EST by Porsche Salguero, RN 02/23/2024 02:54 PM EST by Porsche Salguero, RN  Outgoing Vick Kunz () [phone] (Home)   Left MessageCommunicated - NEELIMA F/U: CM called and spoke to patient to relay that [facility] Pittman.   [facility] will call [facility] to confirm.

## 2024-02-27 ENCOUNTER — OFFICE VISIT (OUTPATIENT)
Dept: INTERNAL MEDICINE | Facility: CLINIC | Age: 89
End: 2024-02-27
Payer: MEDICARE

## 2024-02-27 ENCOUNTER — LAB REQUISITION (OUTPATIENT)
Dept: LAB | Facility: HOSPITAL | Age: 89
End: 2024-02-27
Attending: FAMILY MEDICINE
Payer: MEDICARE

## 2024-02-27 DIAGNOSIS — R19.7 DIARRHEA, UNSPECIFIED: ICD-10-CM

## 2024-02-27 DIAGNOSIS — I10 ESSENTIAL HYPERTENSION: ICD-10-CM

## 2024-02-27 DIAGNOSIS — D64.9 ANEMIA, UNSPECIFIED TYPE: ICD-10-CM

## 2024-02-27 DIAGNOSIS — E78.49 OTHER HYPERLIPIDEMIA: ICD-10-CM

## 2024-02-27 DIAGNOSIS — K92.2 GASTROINTESTINAL HEMORRHAGE, UNSPECIFIED: ICD-10-CM

## 2024-02-27 DIAGNOSIS — Z79.01 CHRONIC ANTICOAGULATION: ICD-10-CM

## 2024-02-27 DIAGNOSIS — K92.2 UPPER GI BLEED: Primary | ICD-10-CM

## 2024-02-27 DIAGNOSIS — D69.6 PLATELETS DECREASED (CMS/HCC): ICD-10-CM

## 2024-02-27 DIAGNOSIS — M81.0 OSTEOPOROSIS, UNSPECIFIED OSTEOPOROSIS TYPE, UNSPECIFIED PATHOLOGICAL FRACTURE PRESENCE: ICD-10-CM

## 2024-02-27 DIAGNOSIS — I48.19 PERSISTENT ATRIAL FIBRILLATION (CMS/HCC): ICD-10-CM

## 2024-02-27 DIAGNOSIS — A04.72 ENTEROCOLITIS DUE TO CLOSTRIDIUM DIFFICILE, NOT SPECIFIED AS RECURRENT: ICD-10-CM

## 2024-02-27 LAB
ALBUMIN SERPL-MCNC: 4 G/DL (ref 3.5–5.7)
ALP SERPL-CCNC: 50 IU/L (ref 34–125)
ALT SERPL-CCNC: 15 IU/L (ref 7–52)
ANION GAP SERPL CALC-SCNC: 9 MEQ/L (ref 3–15)
AST SERPL-CCNC: 18 IU/L (ref 13–39)
BASOPHILS # BLD: 0.01 K/UL (ref 0.01–0.1)
BASOPHILS NFR BLD: 0.1 %
BILIRUB SERPL-MCNC: 0.5 MG/DL (ref 0.3–1.2)
BUN SERPL-MCNC: 15 MG/DL (ref 7–25)
CALCIUM SERPL-MCNC: 8.8 MG/DL (ref 8.6–10.3)
CHLORIDE SERPL-SCNC: 107 MEQ/L (ref 98–107)
CO2 SERPL-SCNC: 21 MEQ/L (ref 21–31)
CREAT SERPL-MCNC: 0.7 MG/DL (ref 0.6–1.2)
DIFFERENTIAL METHOD BLD: ABNORMAL
EGFRCR SERPLBLD CKD-EPI 2021: >60 ML/MIN/1.73M*2
EOSINOPHIL # BLD: 0.04 K/UL (ref 0.04–0.36)
EOSINOPHIL NFR BLD: 0.6 %
ERYTHROCYTE [DISTWIDTH] IN BLOOD BY AUTOMATED COUNT: 13.6 % (ref 11.7–14.4)
GLUCOSE SERPL-MCNC: 93 MG/DL (ref 70–99)
HCT VFR BLD AUTO: 31.4 % (ref 35–45)
HGB BLD-MCNC: 9.8 G/DL (ref 11.8–15.7)
IMM GRANULOCYTES # BLD AUTO: 0.01 K/UL (ref 0–0.08)
IMM GRANULOCYTES NFR BLD AUTO: 0.1 %
LYMPHOCYTES # BLD: 1.07 K/UL (ref 1.2–3.5)
LYMPHOCYTES NFR BLD: 14.9 %
MCH RBC QN AUTO: 30.3 PG (ref 28–33.2)
MCHC RBC AUTO-ENTMCNC: 31.2 G/DL (ref 32.2–35.5)
MCV RBC AUTO: 97.2 FL (ref 83–98)
MONOCYTES # BLD: 0.63 K/UL (ref 0.28–0.8)
MONOCYTES NFR BLD: 8.8 %
NEUTROPHILS # BLD: 5.42 K/UL (ref 1.7–7)
NEUTS SEG NFR BLD: 75.5 %
NRBC BLD-RTO: 0 %
PDW BLD AUTO: 12.1 FL (ref 9.4–12.3)
PLATELET # BLD AUTO: 208 K/UL (ref 150–369)
POTASSIUM SERPL-SCNC: 4.5 MEQ/L (ref 3.5–5.1)
PROT SERPL-MCNC: 5.9 G/DL (ref 6–8.2)
RBC # BLD AUTO: 3.23 M/UL (ref 3.93–5.22)
SODIUM SERPL-SCNC: 137 MEQ/L (ref 136–145)
WBC # BLD AUTO: 7.18 K/UL (ref 3.8–10.5)

## 2024-02-27 PROCEDURE — 36415 COLL VENOUS BLD VENIPUNCTURE: CPT | Performed by: FAMILY MEDICINE

## 2024-02-27 PROCEDURE — 80053 COMPREHEN METABOLIC PANEL: CPT | Performed by: FAMILY MEDICINE

## 2024-02-27 PROCEDURE — 82040 ASSAY OF SERUM ALBUMIN: CPT | Performed by: FAMILY MEDICINE

## 2024-02-27 PROCEDURE — 85025 COMPLETE CBC W/AUTO DIFF WBC: CPT | Performed by: FAMILY MEDICINE

## 2024-02-27 PROCEDURE — 99496 TRANSJ CARE MGMT HIGH F2F 7D: CPT | Performed by: FAMILY MEDICINE

## 2024-02-27 NOTE — PROGRESS NOTES
OFFICE VISIT NOTE     LOAN BRAN DO        PATIENT NAME:Shalini Kunz  PATIENT : 1935  ANNE MARIE: 2024    CC: No chief complaint on file.  Hospital follow-up        HPI   Shalini Kunz is a 88 y.o. female  With HTN, SVT and pyloric stricture seen for ER followup . She is a [facility] resident living with her .     She was admitted to the hospital 2024 through 2024.  She presented with coffee-ground emesis and diarrhea and was found with hgb drop 10/5->8.7 cf a GI bleed.  Of note she is on Eliquis subtherapeutic dose for atrial fibrillation for prior GI bleed history.  She received 1 unit PRBC and underwent an EGD  that did not show acute bleed did show erythema at her GJ anastomosis site and a colonoscopy on  that also did not show an acute bleed.  Gastroenterology recommended lifelong proton pump inhibitor therapy and outpatient follow-up.  Her anticoagulant Eliquis was resumed on discharge with a hemoglobin of 8.9.    Since discharge she was seen by gastroenterology Dr. Joseph Rhoades on 2024.  We reviewed abnormality of her CT abdomen pelvis showing moderate bile duct dilatation and mild pancreatic duct dilatation recommending follow-up MRI- Gi did not feel this was concerning but did order MRI. .  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 Recommended lifelong PPI omeprazol 20mg daily  and surveillance.       See epic TCM RN note 24  Med rec performed    She has loose stool but denies fever, chills, diarrhea, abdominal pain, dark stools.       Specialist   cardiology dr san  audiology-[place]  podiatry-[place]  Rheum dr penn  [facility]- dr ken waller  Derm dr white  ent dr jose alfredo barba    #afib  #HTN  - dr duque -> dr. san  - eliquis 2.5 po bid, metoprolol 25mg po daily    #hx of PE    #HLD- lipitor 5mg po daily        SOCIAL HISTORY:  [facility]   lives with    nightly  wine      FUNCTIONAL HISTORY:  ADL   Feeding-I  Toileting-I  Dressing-I  Bathing-I  Transferring-I    IADL  Medications-I  Shopping-I  Finances-I  Cooking-I  Cleaning-I        ADVANCED CARE PLANNING:  Full           HEALTH MAINTENANCE    -- Pneumonia Immunization:utd   -- Influenza Immunization:utd  -- Shingles Immunization:  -- Colorectal cancer screening:completed  -- Breast Cancer screening:reviewed goc.  She tells me that she would like to continue mammograms  8/22/23  -- Cervical cancer screening:completed  -- Bone Health screening: Tells me she would like to continue DEXA scans 8/22/23 osteoporosis   -- Vision Screening:dr tse   -- Hearing Screening:  -- RSV vaccination: UTD      The following have been reviewed and updated as appropriate in this visit: active problem list, medication list, allergies, family history, social history, health maintenance            Past Medical History:   Diagnosis Date   • Anemia    • Arthritis     thumbs   • Deep vein thrombosis (CMS/HCC)     2019   • Hypertension    • Lightheadedness    • Lipid disorder    • Liver disease        Past Surgical History:   Procedure Laterality Date   • ABDOMINAL SURGERY      2019   • ADENOIDECTOMY Bilateral    • CHOLECYSTECTOMY  10/03/2011   • EYE SURGERY      2020    • GANGLION CYST EXCISION     • GASTRIC BYPASS     • TONSILLECTOMY       Social History     Socioeconomic History   • Marital status:      Spouse name: Not on file   • Number of children: Not on file   • Years of education: Not on file   • Highest education level: Not on file   Occupational History   • Not on file   Tobacco Use   • Smoking status: Never   • Smokeless tobacco: Never   Substance and Sexual Activity   • Alcohol use: Yes     Alcohol/week: 1.0 standard drink of alcohol     Types: 1 Glasses of wine per week     Comment: half one at dinner    • Drug use: Not on file   • Sexual activity: Not on file   Other Topics Concern   • Not on file   Social History Narrative    • Not on file     Social Determinants of Health     Financial Resource Strain: Not on file   Food Insecurity: No Food Insecurity (2/17/2024)    Hunger Vital Sign    • Worried About Running Out of Food in the Last Year: Never true    • Ran Out of Food in the Last Year: Never true   Transportation Needs: No Transportation Needs (2/19/2024)    PRAPARE - Transportation    • Lack of Transportation (Medical): No    • Lack of Transportation (Non-Medical): No   Physical Activity: Not on file   Stress: Not on file   Social Connections: Not on file   Intimate Partner Violence: Not on file   Housing Stability: Low Risk  (2/19/2024)    Housing Stability Vital Sign    • Unable to Pay for Housing in the Last Year: No    • Number of Places Lived in the Last Year: 1    • Unstable Housing in the Last Year: No         Family History   Problem Relation Age of Onset   • Alzheimer's disease Biological Mother    • Heart failure Biological Father    • No Known Problems Biological Sister          No Known Allergies      Current Outpatient Medications   Medication Sig Dispense Refill   • atorvastatin (LIPITOR) 10 mg tablet TAKE 1/2 TABLET  (5 MG TOTAL) BY MOUTH DAILY. 45 tablet 0   • calcium carbonate-vitamin D3 (CALCIUM 600 WITH VITAMIN D3) 600 mg(1,500mg) -400 unit tablet,chewable Take 600 mg by mouth once.     • cholecalciferol, vitamin D3, 1,000 unit (25 mcg) tablet Take 1 tablet (1,000 Units total) by mouth daily. 90 tablet 3   • cyanocobalamin 1,000 mcg tablet Take 1 tablet (1,000 mcg total) by mouth daily. 90 tablet 3   • ELIQUIS 2.5 mg tablet TAKE 1 TABLET BY MOUTH  TWICE DAILY 180 tablet 3   • metoprolol succinate XL (TOPROL-XL) 25 mg 24 hr tablet Take 1 tablet (25 mg total) by mouth daily. 30 tablet 0   • multivitamin capsule Take 1 capsule by mouth daily.     • omeprazole (PriLOSEC) 40 mg capsule Take 1 capsule (40 mg total) by mouth daily. 90 capsule 3   • omeprazole 40 mg capsule Take 40 mg by mouth daily.     • SUMAtriptan  "(IMITREX) 50 mg tablet Take 1 tablet (50 mg total) by mouth once as needed for migraine. 30 tablet 3     No current facility-administered medications for this visit.       Review of Systems  ROS  See hpi.   General: Denies fatigue, weight loss or weight gain.    Head: Denies headaches trauma   Eyes: Denies blurry vision, diplopia, decreased vision.  Denies drainage or excessive tearing.  Ears: Denies tinnitis, decreased hearing, ear drainage  Nose: Denies rhinorrhea, epistaxi  Mouth: Denies dry mouth  Neck: Denies lumps, bumps.  Denies dysphagia, odynophagia  Pulmonary:  Denies shortness of breath, difficulty breathing, excessive drooling, change in sputum.   Cardiac: Denies chest pain, flip-flop sensation   GI/Abdomen: Denies vomit, nausea, diarrhea, constipation, hematochezia.  Denies abdominal pain.    Uro/: Denies hematuria, urgency, frequency, burning sensation with urination. Denies discharge.   Skin: Denies lumps, bumps, rash.   MSK:  Denies weakness, numbness, tingling.    Neuro: Denies confusion, vertigo.              VITALS  There were no vitals filed for this visit.  See chart 130/70, 98, 64, 95% room air    Wt Readings from Last 3 Encounters:   02/22/24 67.1 kg (148 lb)   02/17/24 66.3 kg (146 lb 3.2 oz)   01/29/24 67.1 kg (148 lb)       Ht Readings from Last 3 Encounters:   02/22/24 1.676 m (5' 6\")   02/17/24 1.676 m (5' 6\")   01/29/24 1.676 m (5' 6\")       BP Readings from Last 3 Encounters:   02/22/24 139/63   02/20/24 (!) 120/59   01/29/24 (!) 139/55       Physical Exam  PHYSICAL EXAM  General: Appears well, in no distress. Pt is pleasant. Hygiene normal.  Head: NC/AT.   Eyes: Conjunctiva and sclera normal bilaterally.   Neck: Inspection normal. Trachea midline. supple, FROM without pain. No cervical lymphadenopathy.  No enlargement of thyroid.  Cardiac: regular, rate, and rhythm. No murmurs, rubs, or gallops.  Lungs: negative for respiratory distress with normal respiratory effort. Lungs clear to " "auscultation bilaterally. No wheezes, rales, or rhonchi. No intercostal retractions  Abdomen: +BS. Bowel sounds normal. Abdomen is soft, non-distended. No tenderness. No masses or organomegaly. No guarding.   Skin: warm and dry. No erythema or rash.  Extremities: No peripheral edema or extremity lymphadenopathy  MSK: 5/5 UE and LE b/l. Gait stable and intact. Sitting Balance stable.   Psych: linear. Normal mood and affect.  No SI/HI. AAOX3.           PERTINENT LABS, IMAGING    No new labs.  Lab Results   Component Value Date    WBC 4.24 02/20/2024    HGB 8.9 (L) 02/20/2024    HCT 27.3 (L) 02/20/2024    MCV 96.1 02/20/2024     (L) 02/20/2024         Chemistry        Component Value Date/Time     02/20/2024 0525    K 3.9 02/20/2024 0525     (H) 02/20/2024 0525    CO2 22 02/20/2024 0525    BUN 7 02/20/2024 0525    CREATININE 0.6 02/20/2024 0525        Component Value Date/Time    CALCIUM 8.1 (L) 02/20/2024 0525    ALKPHOS 32 (L) 02/18/2024 0611    AST 11 (L) 02/18/2024 0611    ALT 7 02/18/2024 0611    BILITOT 0.5 02/18/2024 0611            Lab Results   Component Value Date    CHOL 154 02/02/2024    CHOL 173 12/01/2023    CHOL 170 07/14/2023     Lab Results   Component Value Date    HDL 57 02/02/2024    HDL 61 12/01/2023    HDL 52 07/14/2023     Lab Results   Component Value Date    LDLCALC 58 02/02/2024    LDLCALC 72 12/01/2023    LDLCALC 71 07/14/2023     Lab Results   Component Value Date    TRIG 196 (H) 02/02/2024    TRIG 201 (H) 12/01/2023    TRIG 237 (H) 07/14/2023     No results found for: \"CHOLHDL\"    Lab Results   Component Value Date    TSH 3.52 02/02/2024       Lab Results   Component Value Date    HGBA1C 5.4 02/02/2024       No results found for: \"HAV\", \"HEPAIGM\", \"HEPBIGM\", \"HEPBCAB\", \"HBEAG\", \"HEPCAB\"    No results found for: \"MICROALBUR\", \"[id number]\"    No results found for this or any previous visit (from the past 24 hour(s)).    CT ABDOMEN PELVIS WITH IV CONTRAST    Result Date: " 2/17/2024  CLINICAL HISTORY: Abdominal abscess/infection suspected Additional history from chart: 88 y.o. female with past medical history of hypertension, DVT on Eliquis, hyperlipidemia, anemia presenting to emergency department for evaluation of vomiting and diarrhea.  Patient reports last night developing lightheadedness, vomiting, and diarrhea with generalized abdominal pain.  Patient reports the vomit and stool were black.  Patient was brought in via EMS from home.  Patient denies chest pain, shortness of breath. COMPARISON: Chest x-ray 2/17/2024 COMMENT: TECHNIQUE: CT of the abdomen and pelvis was performed after the uneventful administration of intravenous contrast with the patient in the supine position. Images reconstructed/reformatted in the axial, coronal and  sagittal planes. CT DOSE:  One or more dose reduction techniques (e.g. automated exposure control, adjustment of the mA and/or kV according to patient size, use of iterative reconstruction technique) utilized for this examination. ADMINISTERED CONTRAST: 100mL of iopamidoL (ISOVUE-370) 370 mg iodine /mL (76 %) injection 100 mL LOWER CHEST: Right middle lobe solid pulmonary nodule measuring 0.4 cm. Right posterior fat-containing Bochdalek hernia. Right middle lobe linear subsegmental volume loss. Vascular calcifications of the partially imaged descending thoracic aorta. LIVER: Chronic medial segment left hepatic lobe atrophy. BILE DUCTS: Moderate intrahepatic and extra hepatic bile duct dilation. Common hepatic duct measures 1.3 cm. Common bile duct measures 1.2 cm and tapers to 0.8 cm just upstream from the ampulla of Vater. GALLBLADDER: Removed PANCREAS: Pancreatic duct caliber measures up to 0.5 cm at the level of the pancreatic head on coronal image 41. SPLEEN: Within normal limits. ADRENALS: Within normal limits. KIDNEYS/URETERS/BLADDER: Subcentimeter low-attenuation lesions within the kidneys are too small to characterize. Kidneys enhance  excrete contrast symmetrically. No hydroureteronephrosis. Urinary bladder is normal. REPRODUCTIVE ORGANS: Anteverted uterus. Reflux of contrast into dilated left ovarian vein. BOWEL: Colonic diverticulosis without evidence of acute diverticulitis. Terminal ileum and retrocecal appendix are normal. Gastrojejunal anastomosis at the proximal gastric body greater curvature Normal bowel caliber. No ascites or free air, no fluid collection VESSELS: Vascular calcifications. Normal caliber abdominal aorta. LYMPH NODES: within normal limits. ABDOMINAL WALL: Within normal limits. BONES: Diffuse osseous demineralization. Degenerative changes spanning the symphysis pubis. Mild degenerative changes of the sacroiliac joints. Multilevel degenerative disc disease. Grade 1 anterolisthesis of L4 on L5. T9 pagetoid changes. Mild degenerative changes of the hips. Bilateral hip labral chondrocalcinosis. Mild extra convex curvature of the lumbar spine.     IMPRESSION: 1.  Moderate bile duct dilation and mild pancreatic duct dilation, of indeterminate acuity given absence of prior imaging. MRI/MRCP without and with intravenous contrast can be considered if further imaging evaluation is desired. 2.  Additional chronic findings as delineated above. Actionable Finding: Follow up should be considered.    X-RAY CHEST 1 VIEW    Result Date: 2/17/2024  CLINICAL HISTORY: Chest Pain/Arrhythmia     IMPRESSION:No evidence of acute disease COMMENT:Frontal portable erect view of the chest was performed with the patient rotated to the left and compared with the October 8, 2023 examination. Increased conspicuity of the perihilar middle to lower lung zone interstitial which does not meet criteria for pulmonary vascular distribution. No evidence of airspace disease. No evidence of a pleural effusion, pneumothorax or pneumomediastinum. Atheromatous and atherosclerotic changes of the imaged portions of the thoracic aorta.    NM MYOCARDIAL STRESS REST  PHARM    Result Date: 1/29/2024  CLINICAL HISTORY:  R00.2: Palpitations R07.89: Other chest pain COMPARISON: There is no relevant prior study available for comparison. COMMENT: ADMINISTERED DOSE: *  32millicurie of TC-99M SESTAMIBI UD 32 millicurie *  9millicurie of TC-99M SESTAMIBI UD 9 millicurie. *  0.4 mg Lexiscan (REGADENOSON) IV TECHNIQUE:  The patient was injected with 9 mCi Tc 99m Sestamibi and resting SPECT images of the myocardium were obtained.  The patient was then stressed with intravenous Lexiscan. During pharmacologic stress the patient was reinjected with 32 mCi Tc 99m Sestamibi and gated SPECT images of the myocardium were obtained. Note: A large amount of subdiaphragmatic activity diminishes evaluation of the inferior wall. SPECT images: There is no abnormal fixed or reversible defect. GATED IMAGES: Gated images demonstrate no focal wall motion abnormality.  Left ventricular ejection fraction is calculated at 72% with end diastolic volume of 36 cc. TID: 0.84.     IMPRESSION: No evidence for pharmacologically induced ischemia. LVEF: 72%.    CV/NM MPI pharm stress    Result Date: 1/29/2024  •  Negative lexiscan ECG test. Nuclear images pending and are to be reported by radiology •  Response to Stress: There were no arrhythmias during stress. There were no arrhythmias during recovery.               Immunization History   Administered Date(s) Administered   • Pneumococcal Conjugate 13-Valent 09/11/2017   • Pneumococcal Polysaccharide 04/02/2021   • SARS-COV-2 (COVID19) VACCINE, PFIZER MONOVALENT 02/10/2021, 03/24/2021   • Tdap 10/13/2008   • Zoster 05/26/2009         Health Maintenance   Topic Date Due   • RSV (60+ years old [shared decision making] or in pregnancy during 32 through 36 weeks) (1 - 1-dose 60+ series) Never done   • Falls Risk Screening  Never done   • Zoster Vaccine (2 of 3) 07/21/2009   • DTaP, Tdap, and Td Vaccines (2 - Td or Tdap) 10/13/2018   • Influenza Vaccine (1) Never done   •  COVID-19 Vaccine (3 - 2023-24 season) 09/01/2023   • Medicare Annual Wellness Visit  07/28/2024   • Depression Screening  02/17/2025   • DEXA Scan  08/22/2025   • Pneumococcal (65 years and older)  Completed   • Meningococcal ACWY  Aged Out   • RSV <20 months  Aged Out   • HIB Vaccines  Aged Out   • Hepatitis B Vaccines  Aged Out   • IPV Vaccines  Aged Out   • HPV Vaccines  Aged Out            Diagnosis Plan   1. Upper GI bleed        2. Persistent atrial fibrillation (CMS/HCC)  CBC and differential    Comprehensive metabolic panel      3. Essential hypertension  CBC and differential    Comprehensive metabolic panel      4. Chronic anticoagulation        5. Other hyperlipidemia        6. Osteoporosis, unspecified osteoporosis type, unspecified pathological fracture presence        7. Anemia, unspecified type        8. Platelets decreased (CMS/HCC)            Shalini Kunz is a 88 y.o. female  With HTN, SVT and pyloric stricture seen for routine followup . She is a [facility] resident living with her .       Specialist   cardiology dr san  audiology-[place]  podiatry-[place]  Rheum dr penn  [facility]- dr ken waller  Derm dr zeng  ent dr jose alfredo barba        TCM7 day  UGIB  She was admitted to the hospital February 17, 2024 through February 20, 2024.  She presented with coffee-ground emesis and diarrhea and was found with hgb drop 10/5->8.7 cf a GI bleed.  Of note she is on Eliquis subtherapeutic dose for atrial fibrillation for prior GI bleed history.  She received 1 unit PRBC and underwent an EGD February 19 that did not show acute bleed did show erythema at her GJ anastomosis site and a colonoscopy on February 20 that also did not show an acute bleed.  Gastroenterology recommended lifelong proton pump inhibitor therapy and outpatient follow-up.  Her anticoagulant Eliquis was resumed on discharge with a hemoglobin of 8.9.  - repeat labs pending per GI in 1 week   - seen by GI dr adela shell  -  cont ppi omeprazole 40mg lifelong  - rto 4 motnhs      Memory changes   she feels she is in a fog and touble executing tasks   [facility]  Her son who usually is her support system is biking across [place]   adl and iadl-I  minicog normal  NS eval ordered per her request       moderate bile duct dilatation and mild pancreatic duct dilatation -GI ordered MRI      afib  HTN  - dr duque -> dr san  caridiology  - eliquis 2.5 po bid ( rec 5 bid dosing but pt reluctant with hx of GIB), metoprolol 25mg po daily    hx of PE    HLD- lipitor 5mg po daily,check level  January 2024 stress test negative    osteoporosis  vitamin D def- dec level 25 taking 1000 IU po daily  Rheum dr penn   reclast     b12 def-  reduce to QOD ,Repeat level    left wet mac deg- injections  right dry mac degen- eye    BCC-derm dr white, Right arm    migraine- prn sumatriptan 50mg working well    GOO sp lap gastro-jejunostomy dr Sy July 2019 , PRN omepzole 40mg po daily  pyloric stenosis- dilation in past  sp CCY      MAW 7/28/23   clock draw normal   word recall 3/3  ADLs and IADLs independent  Social alcohol use denies tobacco use  She is driving without issues    [employer] is good.  Exercising regularly takes medications as prescribed denies falls    To do  GI labs   MRI MRCP  tdap  shinrix        Return in about 3 months (around 5/27/2024), or if symptoms worsen or fail to improve, for Followup with Dr. Renée Pittman.    Renée Pittman, DO

## 2024-02-28 ENCOUNTER — TELEPHONE (OUTPATIENT)
Dept: PRIMARY CARE | Facility: CLINIC | Age: 89
End: 2024-02-28
Payer: MEDICARE

## 2024-02-28 NOTE — TELEPHONE ENCOUNTER
----- Message from Renée Pittman DO sent at 2/28/2024  7:46 AM EST -----  Pt was seen in the office at [facility] yesterday. She had a recent pper GIB and was seen by GI. Please let her know that her hemoglobin is stable and improved at  9.8 thanks

## 2024-03-07 ENCOUNTER — PATIENT OUTREACH (OUTPATIENT)
Dept: CASE MANAGEMENT | Facility: CLINIC | Age: 89
End: 2024-03-07
Payer: MEDICARE

## 2024-03-07 ENCOUNTER — TELEPHONE (OUTPATIENT)
Dept: SCHEDULING | Facility: CLINIC | Age: 89
End: 2024-03-07
Payer: MEDICARE

## 2024-03-07 NOTE — PROGRESS NOTES
03/07/2024 01:10 PM EST by Porsche Salguero RN 03/07/2024 01:10 PM EST by Porsche Salguero, RN  Outgoing Shalini Kunz ([facility]) [phone] (Mobile)   Left MessageCommunicated - NEELIMA F/U

## 2024-03-08 ENCOUNTER — LAB REQUISITION (OUTPATIENT)
Dept: LAB | Facility: HOSPITAL | Age: 89
End: 2024-03-08
Attending: STUDENT IN AN ORGANIZED HEALTH CARE EDUCATION/TRAINING PROGRAM
Payer: MEDICARE

## 2024-03-08 DIAGNOSIS — K92.2 GASTROINTESTINAL HEMORRHAGE, UNSPECIFIED: ICD-10-CM

## 2024-03-08 DIAGNOSIS — K83.8 OTHER SPECIFIED DISEASES OF BILIARY TRACT: ICD-10-CM

## 2024-03-08 LAB
ANION GAP SERPL CALC-SCNC: 7 MEQ/L (ref 3–15)
BASOPHILS # BLD: 0.01 K/UL (ref 0.01–0.1)
BASOPHILS NFR BLD: 0.2 %
BUN SERPL-MCNC: 16 MG/DL (ref 7–25)
CALCIUM SERPL-MCNC: 9 MG/DL (ref 8.6–10.3)
CHLORIDE SERPL-SCNC: 108 MEQ/L (ref 98–107)
CO2 SERPL-SCNC: 24 MEQ/L (ref 21–31)
CREAT SERPL-MCNC: 0.7 MG/DL (ref 0.6–1.2)
DIFFERENTIAL METHOD BLD: ABNORMAL
EGFRCR SERPLBLD CKD-EPI 2021: >60 ML/MIN/1.73M*2
EOSINOPHIL # BLD: 0.05 K/UL (ref 0.04–0.36)
EOSINOPHIL NFR BLD: 1 %
ERYTHROCYTE [DISTWIDTH] IN BLOOD BY AUTOMATED COUNT: 13.6 % (ref 11.7–14.4)
GLUCOSE SERPL-MCNC: 101 MG/DL (ref 70–99)
HCT VFR BLD AUTO: 32 % (ref 35–45)
HGB BLD-MCNC: 9.9 G/DL (ref 11.8–15.7)
IMM GRANULOCYTES # BLD AUTO: 0.01 K/UL (ref 0–0.08)
IMM GRANULOCYTES NFR BLD AUTO: 0.2 %
LYMPHOCYTES # BLD: 0.9 K/UL (ref 1.2–3.5)
LYMPHOCYTES NFR BLD: 18.6 %
MCH RBC QN AUTO: 29.2 PG (ref 28–33.2)
MCHC RBC AUTO-ENTMCNC: 30.9 G/DL (ref 32.2–35.5)
MCV RBC AUTO: 94.4 FL (ref 83–98)
MONOCYTES # BLD: 0.47 K/UL (ref 0.28–0.8)
MONOCYTES NFR BLD: 9.7 %
NEUTROPHILS # BLD: 3.41 K/UL (ref 1.7–7)
NEUTS SEG NFR BLD: 70.3 %
NRBC BLD-RTO: 0 %
PDW BLD AUTO: 10.9 FL (ref 9.4–12.3)
PLATELET # BLD AUTO: 278 K/UL (ref 150–369)
POTASSIUM SERPL-SCNC: 4.5 MEQ/L (ref 3.5–5.1)
RBC # BLD AUTO: 3.39 M/UL (ref 3.93–5.22)
SODIUM SERPL-SCNC: 139 MEQ/L (ref 136–145)
WBC # BLD AUTO: 4.85 K/UL (ref 3.8–10.5)

## 2024-03-08 PROCEDURE — 85025 COMPLETE CBC W/AUTO DIFF WBC: CPT | Performed by: STUDENT IN AN ORGANIZED HEALTH CARE EDUCATION/TRAINING PROGRAM

## 2024-03-08 PROCEDURE — 80048 BASIC METABOLIC PNL TOTAL CA: CPT | Performed by: STUDENT IN AN ORGANIZED HEALTH CARE EDUCATION/TRAINING PROGRAM

## 2024-03-12 ENCOUNTER — TELEPHONE (OUTPATIENT)
Dept: INTERNAL MEDICINE | Facility: HOSPITAL | Age: 89
End: 2024-03-12

## 2024-03-12 NOTE — TELEPHONE ENCOUNTER
Called patient twice, unable to reach patient. Left voicemail inquiring ongoing symptoms as well as what prescription. Happy to provide patient medication, however need more information regarding patient's symptoms. Advised her to call back the [facility] and further reviewed recent blood work that was reassuring with improving/rising Hgb and stable renal function prior to her scheduled MRI/MRCP. Again, advised her to call back the [facility] regarding symptoms prior to sending a prescription to her pharmacy.

## 2024-03-13 ENCOUNTER — TELEPHONE (OUTPATIENT)
Dept: SCHEDULING | Facility: CLINIC | Age: 89
End: 2024-03-13

## 2024-03-13 NOTE — TELEPHONE ENCOUNTER
Pt called back this morning and stated that the only symptoms that she had was the morning nausea that continues on throughout the day.

## 2024-03-13 NOTE — TELEPHONE ENCOUNTER
Hospital Follow Up     Name of patient: Shalini Kunz    Caller Name: Shalini Kunz     Relationship to patient: self     New or Established: Est      Reason for visit: HFU // [facility] 2/17-2/20    PCP: Renée Pittman DO     Insurance name: [facility]    Doctor requested: Dr. Jose    Name of hospital pt was admitted: [facility]     Timeframe instructed to follow-up within: not defined      Best contact number: [phone]

## 2024-03-13 NOTE — TELEPHONE ENCOUNTER
"Called Shalini to set up Hospital Follow up and' she said I don't want to talk right now\" and hung up on me. Thanks  "

## 2024-03-14 NOTE — TELEPHONE ENCOUNTER
Attempted to reach patient yesterday and again this morning. Advised her to call back the [facility] as I have not been able to reach her. Need more clarification regarding symptoms prior to prescribing treatment. Unclear if she is still taking her PPI or if there is an alternative cause and then would be happy to prescribe Zofran. If patient calls back the [facility], please inquire if there is an alternative phone to reach her. Thanks! Will make another attempt to contact patient later this afternoon.

## 2024-03-14 NOTE — TELEPHONE ENCOUNTER
Sounds great. I will also reach out and pass on the message and request an alternate phone number.

## 2024-03-15 ENCOUNTER — LAB REQUISITION (OUTPATIENT)
Dept: LAB | Facility: HOSPITAL | Age: 89
End: 2024-03-15
Attending: STUDENT IN AN ORGANIZED HEALTH CARE EDUCATION/TRAINING PROGRAM
Payer: MEDICARE

## 2024-03-15 ENCOUNTER — TELEPHONE (OUTPATIENT)
Dept: INTERNAL MEDICINE | Facility: HOSPITAL | Age: 89
End: 2024-03-15
Payer: MEDICARE

## 2024-03-15 DIAGNOSIS — K83.8 COMMON BILE DUCT DILATATION: Primary | ICD-10-CM

## 2024-03-15 DIAGNOSIS — K86.89 DILATION OF PANCREATIC DUCT: ICD-10-CM

## 2024-03-15 DIAGNOSIS — K92.2 GASTROINTESTINAL HEMORRHAGE, UNSPECIFIED: ICD-10-CM

## 2024-03-15 LAB
ANION GAP SERPL CALC-SCNC: 8 MEQ/L (ref 3–15)
BASOPHILS # BLD: 0.02 K/UL (ref 0.01–0.1)
BASOPHILS NFR BLD: 0.4 %
BUN SERPL-MCNC: 18 MG/DL (ref 7–25)
CALCIUM SERPL-MCNC: 9 MG/DL (ref 8.6–10.3)
CHLORIDE SERPL-SCNC: 108 MEQ/L (ref 98–107)
CO2 SERPL-SCNC: 22 MEQ/L (ref 21–31)
CREAT SERPL-MCNC: 0.8 MG/DL (ref 0.6–1.2)
DIFFERENTIAL METHOD BLD: ABNORMAL
EGFRCR SERPLBLD CKD-EPI 2021: >60 ML/MIN/1.73M*2
EOSINOPHIL # BLD: 0.07 K/UL (ref 0.04–0.36)
EOSINOPHIL NFR BLD: 1.3 %
ERYTHROCYTE [DISTWIDTH] IN BLOOD BY AUTOMATED COUNT: 13.8 % (ref 11.7–14.4)
GLUCOSE SERPL-MCNC: 92 MG/DL (ref 70–99)
HCT VFR BLD AUTO: 33.5 % (ref 35–45)
HGB BLD-MCNC: 10.4 G/DL (ref 11.8–15.7)
IMM GRANULOCYTES # BLD AUTO: 0.02 K/UL (ref 0–0.08)
IMM GRANULOCYTES NFR BLD AUTO: 0.4 %
LYMPHOCYTES # BLD: 1.06 K/UL (ref 1.2–3.5)
LYMPHOCYTES NFR BLD: 19.7 %
MCH RBC QN AUTO: 28.4 PG (ref 28–33.2)
MCHC RBC AUTO-ENTMCNC: 31 G/DL (ref 32.2–35.5)
MCV RBC AUTO: 91.5 FL (ref 83–98)
MONOCYTES # BLD: 0.54 K/UL (ref 0.28–0.8)
MONOCYTES NFR BLD: 10.1 %
NEUTROPHILS # BLD: 3.66 K/UL (ref 1.7–7)
NEUTS SEG NFR BLD: 68.1 %
NRBC BLD-RTO: 0 %
PDW BLD AUTO: 10.9 FL (ref 9.4–12.3)
PLATELET # BLD AUTO: 242 K/UL (ref 150–369)
POTASSIUM SERPL-SCNC: 4.3 MEQ/L (ref 3.5–5.1)
RBC # BLD AUTO: 3.66 M/UL (ref 3.93–5.22)
SODIUM SERPL-SCNC: 138 MEQ/L (ref 136–145)
WBC # BLD AUTO: 5.37 K/UL (ref 3.8–10.5)

## 2024-03-15 PROCEDURE — 85025 COMPLETE CBC W/AUTO DIFF WBC: CPT | Performed by: STUDENT IN AN ORGANIZED HEALTH CARE EDUCATION/TRAINING PROGRAM

## 2024-03-15 PROCEDURE — 82310 ASSAY OF CALCIUM: CPT | Performed by: STUDENT IN AN ORGANIZED HEALTH CARE EDUCATION/TRAINING PROGRAM

## 2024-03-15 PROCEDURE — 36415 COLL VENOUS BLD VENIPUNCTURE: CPT | Performed by: STUDENT IN AN ORGANIZED HEALTH CARE EDUCATION/TRAINING PROGRAM

## 2024-03-15 NOTE — TELEPHONE ENCOUNTER
[facility] called and asked if the script for Mri can be changed to medicare/enrolled. Pt has a apt scheduled for Monday

## 2024-03-15 NOTE — TELEPHONE ENCOUNTER
Okay. If the patient calls again, I will let her know that the office has tried multiple times to contact her in regards to her symptoms.

## 2024-03-18 ENCOUNTER — TELEPHONE (OUTPATIENT)
Dept: INTERNAL MEDICINE | Facility: HOSPITAL | Age: 89
End: 2024-03-18

## 2024-03-18 ENCOUNTER — TELEPHONE (OUTPATIENT)
Dept: INTERNAL MEDICINE | Facility: HOSPITAL | Age: 89
End: 2024-03-18
Payer: MEDICARE

## 2024-03-18 ENCOUNTER — HOSPITAL ENCOUNTER (OUTPATIENT)
Dept: RADIOLOGY | Age: 89
End: 2024-03-18
Attending: STUDENT IN AN ORGANIZED HEALTH CARE EDUCATION/TRAINING PROGRAM
Payer: MEDICARE

## 2024-03-18 NOTE — TELEPHONE ENCOUNTER
Pt having an MRI today at 1pm, The script was incorrect for the procedure pt is having to day at 1pm.     Please call either [phone] or [phone] as soon as possible or the appt will have to be cancelled.

## 2024-03-19 ENCOUNTER — OFFICE VISIT (OUTPATIENT)
Dept: CARDIOLOGY | Facility: CLINIC | Age: 89
End: 2024-03-19
Payer: MEDICARE

## 2024-03-19 VITALS
RESPIRATION RATE: 18 BRPM | DIASTOLIC BLOOD PRESSURE: 68 MMHG | WEIGHT: 146 LBS | BODY MASS INDEX: 23.46 KG/M2 | SYSTOLIC BLOOD PRESSURE: 118 MMHG | HEIGHT: 66 IN | HEART RATE: 74 BPM | OXYGEN SATURATION: 99 %

## 2024-03-19 DIAGNOSIS — I48.0 PAROXYSMAL ATRIAL FIBRILLATION (CMS/HCC): ICD-10-CM

## 2024-03-19 DIAGNOSIS — I10 ESSENTIAL HYPERTENSION: Primary | ICD-10-CM

## 2024-03-19 DIAGNOSIS — E78.49 OTHER HYPERLIPIDEMIA: ICD-10-CM

## 2024-03-19 LAB
ATRIAL RATE: 70
P AXIS: 258
PR INTERVAL: 170
QRS DURATION: 80
QT INTERVAL: 378
QTC CALCULATION(BAZETT): 408
R AXIS: 82
T WAVE AXIS: 65
VENTRICULAR RATE: 70

## 2024-03-19 PROCEDURE — 93000 ELECTROCARDIOGRAM COMPLETE: CPT | Performed by: INTERNAL MEDICINE

## 2024-03-19 PROCEDURE — 99215 OFFICE O/P EST HI 40 MIN: CPT | Performed by: INTERNAL MEDICINE

## 2024-03-19 RX ORDER — VIT A/VIT C/VIT E/ZINC/COPPER 4296-226
CAPSULE ORAL
Status: ON HOLD | COMMUNITY
End: 2024-05-01

## 2024-03-19 ASSESSMENT — ENCOUNTER SYMPTOMS
ORTHOPNEA: 0
PALPITATIONS: 0
DYSPNEA ON EXERTION: 0
IRREGULAR HEARTBEAT: 0
PND: 0
NEAR-SYNCOPE: 0
DIZZINESS: 0
LIGHT-HEADEDNESS: 0
SYNCOPE: 0

## 2024-03-19 NOTE — LETTER
March 20, 2024     Renée Pittman DO  [address] [facility] [address]  [facility] [address]    Patient: Shalini Kunz  YOB: 1935  Date of Visit: 3/19/2024      Dear Dr. Pittman:    Thank you for referring Shalini Kunz to me for evaluation. Below are my notes for this consultation.    If you have questions, please do not hesitate to call me. I look forward to following your patient along with you.         Sincerely,        Ron Jose MD        CC: DO Damon Qureshi Michael, MD  3/20/2024  5:11 PM  Signed     Cardiology Note       Reason for visit: Paroxysmal atrial fibrillation.      Shalini presents today for follow-up.  She had presented to [facility] with hypotension,  diarrhea, nausea and vomiting.  Her hemoglobin was 7.8 and she did receive 1 unit of packed RBCs.  Her EGD on 219 was negative for acute bleeding colonoscopy was negative as well.  She resumed her anticoagulation.    On my last visit and on her visit with EPS Dr. Storm it was recommended that Shalini increase her apixaban to 5 mg twice a day because she was not on adequate dosage.  Although her age is above 80, she weighs 66 kg and has a creatinine of 0.8 and does not meet criteria for dose adjusted apixaban.    Since her discharge, she has been feeling mildly fatigued but this has been improving.  There has not been any chest discomfort, shortness of breath or palpitations.  No neurological or TIA-like symptoms.  There has not been any melena or hematochezia.    Her blood pressure today in the office was 118/68.  Her EKG revealed normal sinus rhythm and was normal.        Past Medical History:   Diagnosis Date   • Anemia    • Arthritis     thumbs   • Deep vein thrombosis (CMS/HCC)     2019   • Hypertension    • Lightheadedness    • Lipid disorder    • Liver disease      Past Surgical History:   Procedure Laterality Date   • ABDOMINAL SURGERY      2019   • ADENOIDECTOMY Bilateral    • CHOLECYSTECTOMY  10/03/2011    • EYE SURGERY      2020    • GANGLION CYST EXCISION     • GASTRIC BYPASS     • TONSILLECTOMY       Social History     Tobacco Use   • Smoking status: Never   • Smokeless tobacco: Never   Substance Use Topics   • Alcohol use: Yes     Alcohol/week: 1.0 standard drink of alcohol     Types: 1 Glasses of wine per week     Comment: half one at dinner       Family History   Problem Relation Age of Onset   • Alzheimer's disease Biological Mother    • Heart failure Biological Father    • No Known Problems Biological Sister      Patient has no known allergies.  Current Outpatient Medications   Medication Instructions   • atorvastatin (LIPITOR) 10 mg tablet TAKE 1/2 TABLET  (5 MG TOTAL) BY MOUTH DAILY.   • calcium carbonate-vitamin D3 (CALCIUM 600 WITH VITAMIN D3) 600 mg(1,500mg) -400 unit tablet,chewable 600 mg, oral, Once   • cholecalciferol (vitamin D3) 1,000 Units, oral, Daily   • cyanocobalamin (VITAMIN B12) 1,000 mcg, oral, Daily   • ELIQUIS 2.5 mg tablet TAKE 1 TABLET BY MOUTH  TWICE DAILY   • metoprolol succinate XL (TOPROL-XL) 25 mg, oral, Daily   • multivitamin capsule 1 capsule, oral, Daily   • omeprazole (PRILOSEC) 40 mg, oral, Daily   • SUMAtriptan (IMITREX) 50 mg, oral, Once as needed   • vitamins A,C,E-zinc-copper (PRESERVISION AREDS) 4,296 mcg-226 mg-90 mg capsule as directed Orally          Review of Systems   Cardiovascular: Negative for chest pain, dyspnea on exertion, irregular heartbeat, leg swelling, near-syncope, orthopnea, palpitations, paroxysmal nocturnal dyspnea and syncope.   Neurological: Negative for dizziness and light-headedness.      Objective    Vitals:    03/19/24 1540   BP: 118/68   Pulse: 74   Resp: 18   SpO2: 99%      Wt Readings from Last 3 Encounters:   03/19/24 66.2 kg (146 lb)   03/18/24 65.8 kg (145 lb)   02/22/24 67.1 kg (148 lb)      Physical Exam  Constitutional:       Appearance: She is well-developed.   Neck:      Vascular: No carotid bruit or JVD.   Cardiovascular:      Rate  and Rhythm: Normal rate and regular rhythm.      Pulses:           Carotid pulses are 2+ on the right side and 2+ on the left side.       Dorsalis pedis pulses are 2+ on the right side and 2+ on the left side.        Posterior tibial pulses are 2+ on the right side and 2+ on the left side.      Heart sounds: S1 normal and S2 normal. No murmur heard.     No friction rub. No gallop.   Pulmonary:      Effort: Pulmonary effort is normal.      Breath sounds: Normal breath sounds.   Musculoskeletal:      Right lower leg: No edema.      Left lower leg: No edema.   Skin:     General: Skin is warm and dry.   Neurological:      Mental Status: She is alert.   Psychiatric:         Behavior: Behavior normal.                   Pharmacologic nuclear stress test.  1/29/2024.  •  Negative lexiscan ECG test. Nuclear images pending and are to be reported by radiology  •  Response to Stress: There were no arrhythmias during stress. There were no arrhythmias during recovery.  IMPRESSION:  No evidence for pharmacologically induced ischemia.  LVEF: 72%.    Transthoracic echocardiogram.  3/7/2023  •  Left Ventricle: Normal ventricle size. Normal wall thickness. Estimated EF 55%. No regional wall motion abnormalities. Grade I diastolic dysfunction.  •  Right Ventricle: Normal ventricle size. Low normal systolic function.  •  Left Atrium: Mildly dilated atrium.  •  Right Atrium: Normal sized atrium.  •  Aortic Valve: Tricuspid valve. Moderate regurgitation. No stenosis.  Aorta top normal 3.8 cm  •  Mitral Valve: Bileaflet thickening. Mild regurgitation. The jet is eccentric.  •  Tricuspid Valve: Normal structure. Mild regurgitation. Estimated RVSP = 27 mmHg.  •  Pulmonic Valve: Normal structure. Trace regurgitation.  •  IVC/SVC: Inferior vena cava is <2.1cm. Inferior vena cava collapses >50% during inspiration.  •  Pericardium: Normal structure.      ECG.  Normal sinus rhythm.  Normal EKG.     Assessment/Plan    Paroxysmal atrial  fibrillation (CMS/HCC)  Shalini is reluctant to take the higher dose of Eliquis.  Given her history of GI bleeding, I discussed with her undergoing an atrial occlusion device such as a watchman.  Consideration can also be given to stopping the Eliquis altogether given that she just had 1 episode of atrial fibrillation and pulmonary emboli at the time of an acute illness.    I will have her be evaluated by Dr Carias for possible Watchman device.  I explained about the procedure and what it entails.  She says she would be interested in considering this.    Essential hypertension  Blood pressure today in the office was 118/68.  She will continue on the Toprol XL 25 mg daily.    Other hyperlipidemia  Continue atorvastatin 10 mg daily.  Lipid profile done last month revealed a total cholesterol of 154, triglycerides 196, HDL 55 and LDL 58.    I counseled her on a low-fat low-cholesterol diet as well as walking for exercise.    We had a prolonged discussion about these complex clinical issues and went over the various important aspects to consider. All questions were answered.    I will arrange for Shalini to be evaluated for possible watchman.  She will return to see me in 6 months time or sooner if there is a problem.  I will continue to keep you informed of her progress.  She will call with any questions or concerns in the interim.           Thank you for allowing me to participate in the care of this patient.  If you have any questions please don't hesitate to contact me.    I spent 40 minutes on this date of service performing the following activities: obtaining history, performing examination, entering orders, documenting, preparing for visit, obtaining / reviewing records, providing counseling and education and communicating results.     Ron Jose MD [facility]   3/20/2024  5:11 PM

## 2024-03-19 NOTE — PATIENT INSTRUCTIONS
Patient Education   Atrial Fibrillation    Atrial fibrillation is a type of irregular or rapid heartbeat (arrhythmia). In atrial fibrillation, the top part of the heart (atria) beats in an irregular pattern. This makes the heart unable to pump blood normally and effectively.  The goal of treatment is to prevent blood clots from forming, control your heart rate, or restore your heartbeat to a normal rhythm. If this condition is not treated, it can cause serious problems, such as a weakened heart muscle (cardiomyopathy) or a stroke.  What are the causes?  This condition is often caused by medical conditions that damage the heart's electrical system. These include:  High blood pressure (hypertension). This is the most common cause.  Certain heart problems or conditions, such as heart failure, coronary artery disease, heart valve problems, or heart surgery.  Diabetes.  Overactive thyroid (hyperthyroidism).  Obesity.  Chronic kidney disease.  In some cases, the cause of this condition is not known.  What increases the risk?  This condition is more likely to develop in:  Older people.  People who smoke.  Athletes who do endurance exercise.  People who have a family history of atrial fibrillation.  Men.  People who use drugs.  People who drink a lot of alcohol.  People who have lung conditions, such as emphysema, pneumonia, or COPD.  People who have obstructive sleep apnea.  What are the signs or symptoms?  Symptoms of this condition include:  A feeling that your heart is racing or beating irregularly.  Discomfort or pain in your chest.  Shortness of breath.  Sudden light-headedness or weakness.  Tiring easily during exercise or activity.  Fatigue.  Syncope (fainting).  Sweating.  In some cases, there are no symptoms.  How is this diagnosed?  Your health care provider may detect atrial fibrillation when taking your pulse. If detected, this condition may be diagnosed with:  An electrocardiogram (ECG) to check electrical  signals of the heart.  An ambulatory cardiac monitor to record your heart's activity for a few days.  A transthoracic echocardiogram (TTE) to create pictures of your heart.  A transesophageal echocardiogram (INESSA) to create even closer pictures of your heart.  A stress test to check your blood supply while you exercise.  Imaging tests, such as a CT scan or chest X-ray.  Blood tests.  How is this treated?  Treatment depends on underlying conditions and how you feel when you experience atrial fibrillation. This condition may be treated with:  Medicines to prevent blood clots or to treat heart rate or heart rhythm problems.  Electrical cardioversion to reset the heart's rhythm.  A pacemaker to correct abnormal heart rhythm.  Ablation to remove the heart tissue that sends abnormal signals.  Left atrial appendage closure to seal the area where blood clots can form.  In some cases, underlying conditions will be treated.  Follow these instructions at home:  Medicines  Take over-the counter and prescription medicines only as told by your health care provider.  Do not take any new medicines without talking to your health care provider.  If you are taking blood thinners:  Talk with your health care provider before you take any medicines that contain aspirin or NSAIDs, such as ibuprofen. These medicines increase your risk for dangerous bleeding.  Take your medicine exactly as told, at the same time every day.  Avoid activities that could cause injury or bruising, and follow instructions about how to prevent falls.  Wear a medical alert bracelet or carry a card that lists what medicines you take.  Lifestyle         Do not use any products that contain nicotine or tobacco, such as cigarettes, e-cigarettes, and chewing tobacco. If you need help quitting, ask your health care provider.  Eat heart-healthy foods. Talk with a dietitian to make an eating plan that is right for you.  Exercise regularly as told by your health care  "provider.  Do not drink alcohol.  Lose weight if you are overweight.  Do not use drugs, including cannabis.  General instructions  If you have obstructive sleep apnea, manage your condition as told by your health care provider.  Do not use diet pills unless your health care provider approves. Diet pills can make heart problems worse.  Keep all follow-up visits as told by your health care provider. This is important.  Contact a health care provider if you:  Notice a change in the rate, rhythm, or strength of your heartbeat.  Are taking a blood thinner and you notice more bruising.  Tire more easily when you exercise or do heavy work.  Have a sudden change in weight.  Get help right away if you have:    Chest pain, abdominal pain, sweating, or weakness.  Trouble breathing.  Side effects of blood thinners, such as blood in your vomit, stool, or urine, or bleeding that cannot stop.  Any symptoms of a stroke. \"BE FAST\" is an easy way to remember the main warning signs of a stroke:  B - Balance. Signs are dizziness, sudden trouble walking, or loss of balance.  E - Eyes. Signs are trouble seeing or a sudden change in vision.  F - Face. Signs are sudden weakness or numbness of the face, or the face or eyelid drooping on one side.  A - Arms. Signs are weakness or numbness in an arm. This happens suddenly and usually on one side of the body.  S - Speech. Signs are sudden trouble speaking, slurred speech, or trouble understanding what people say.  T - Time. Time to call emergency services. Write down what time symptoms started.  Other signs of a stroke, such as:  A sudden, severe headache with no known cause.  Nausea or vomiting.  Seizure.  These symptoms may represent a serious problem that is an emergency. Do not wait to see if the symptoms will go away. Get medical help right away. Call your local emergency services (911 in the [place] Do not drive yourself to the hospital.  Summary  Atrial fibrillation is a type of " irregular or rapid heartbeat (arrhythmia).  Symptoms include a feeling that your heart is beating fast or irregularly.  You may be given medicines to prevent blood clots or to treat heart rate or heart rhythm problems.  Get help right away if you have signs or symptoms of a stroke.  Get help right away if you cannot catch your breath or have chest pain or pressure.  This information is not intended to replace advice given to you by your health care provider. Make sure you discuss any questions you have with your health care provider.  Document Revised: 06/10/2020 Document Reviewed: 06/10/2020  Elsevier Patient Education © 2023 [employer]

## 2024-03-19 NOTE — PROGRESS NOTES
Cardiology Note       Reason for visit: Paroxysmal atrial fibrillation.      Shalini presents today for follow-up.  She had presented to [facility] with hypotension,  diarrhea, nausea and vomiting.  Her hemoglobin was 7.8 and she did receive 1 unit of packed RBCs.  Her EGD on 219 was negative for acute bleeding colonoscopy was negative as well.  She resumed her anticoagulation.    On my last visit and on her visit with EPS Dr. Storm it was recommended that Shalini increase her apixaban to 5 mg twice a day because she was not on adequate dosage.  Although her age is above 80, she weighs 66 kg and has a creatinine of 0.8 and does not meet criteria for dose adjusted apixaban.    Since her discharge, she has been feeling mildly fatigued but this has been improving.  There has not been any chest discomfort, shortness of breath or palpitations.  No neurological or TIA-like symptoms.  There has not been any melena or hematochezia.    Her blood pressure today in the office was 118/68.  Her EKG revealed normal sinus rhythm and was normal.        Past Medical History:   Diagnosis Date   • Anemia    • Arthritis     thumbs   • Deep vein thrombosis (CMS/HCC)     2019   • Hypertension    • Lightheadedness    • Lipid disorder    • Liver disease      Past Surgical History:   Procedure Laterality Date   • ABDOMINAL SURGERY      2019   • ADENOIDECTOMY Bilateral    • CHOLECYSTECTOMY  10/03/2011   • EYE SURGERY      2020    • GANGLION CYST EXCISION     • GASTRIC BYPASS     • TONSILLECTOMY       Social History     Tobacco Use   • Smoking status: Never   • Smokeless tobacco: Never   Substance Use Topics   • Alcohol use: Yes     Alcohol/week: 1.0 standard drink of alcohol     Types: 1 Glasses of wine per week     Comment: half one at dinner       Family History   Problem Relation Age of Onset   • Alzheimer's disease Biological Mother    • Heart failure Biological Father    • No Known Problems Biological Sister      Patient has no  known allergies.  Current Outpatient Medications   Medication Instructions   • atorvastatin (LIPITOR) 10 mg tablet TAKE 1/2 TABLET  (5 MG TOTAL) BY MOUTH DAILY.   • calcium carbonate-vitamin D3 (CALCIUM 600 WITH VITAMIN D3) 600 mg(1,500mg) -400 unit tablet,chewable 600 mg, oral, Once   • cholecalciferol (vitamin D3) 1,000 Units, oral, Daily   • cyanocobalamin (VITAMIN B12) 1,000 mcg, oral, Daily   • ELIQUIS 2.5 mg tablet TAKE 1 TABLET BY MOUTH  TWICE DAILY   • metoprolol succinate XL (TOPROL-XL) 25 mg, oral, Daily   • multivitamin capsule 1 capsule, oral, Daily   • omeprazole (PRILOSEC) 40 mg, oral, Daily   • SUMAtriptan (IMITREX) 50 mg, oral, Once as needed   • vitamins A,C,E-zinc-copper (PRESERVISION AREDS) 4,296 mcg-226 mg-90 mg capsule as directed Orally          Review of Systems   Cardiovascular: Negative for chest pain, dyspnea on exertion, irregular heartbeat, leg swelling, near-syncope, orthopnea, palpitations, paroxysmal nocturnal dyspnea and syncope.   Neurological: Negative for dizziness and light-headedness.      Objective    Vitals:    03/19/24 1540   BP: 118/68   Pulse: 74   Resp: 18   SpO2: 99%      Wt Readings from Last 3 Encounters:   03/19/24 66.2 kg (146 lb)   03/18/24 65.8 kg (145 lb)   02/22/24 67.1 kg (148 lb)      Physical Exam  Constitutional:       Appearance: She is well-developed.   Neck:      Vascular: No carotid bruit or JVD.   Cardiovascular:      Rate and Rhythm: Normal rate and regular rhythm.      Pulses:           Carotid pulses are 2+ on the right side and 2+ on the left side.       Dorsalis pedis pulses are 2+ on the right side and 2+ on the left side.        Posterior tibial pulses are 2+ on the right side and 2+ on the left side.      Heart sounds: S1 normal and S2 normal. No murmur heard.     No friction rub. No gallop.   Pulmonary:      Effort: Pulmonary effort is normal.      Breath sounds: Normal breath sounds.   Musculoskeletal:      Right lower leg: No edema.      Left  lower leg: No edema.   Skin:     General: Skin is warm and dry.   Neurological:      Mental Status: She is alert.   Psychiatric:         Behavior: Behavior normal.                   Pharmacologic nuclear stress test.  1/29/2024.  •  Negative lexiscan ECG test. Nuclear images pending and are to be reported by radiology  •  Response to Stress: There were no arrhythmias during stress. There were no arrhythmias during recovery.  IMPRESSION:  No evidence for pharmacologically induced ischemia.  LVEF: 72%.    Transthoracic echocardiogram.  3/7/2023  •  Left Ventricle: Normal ventricle size. Normal wall thickness. Estimated EF 55%. No regional wall motion abnormalities. Grade I diastolic dysfunction.  •  Right Ventricle: Normal ventricle size. Low normal systolic function.  •  Left Atrium: Mildly dilated atrium.  •  Right Atrium: Normal sized atrium.  •  Aortic Valve: Tricuspid valve. Moderate regurgitation. No stenosis.  Aorta top normal 3.8 cm  •  Mitral Valve: Bileaflet thickening. Mild regurgitation. The jet is eccentric.  •  Tricuspid Valve: Normal structure. Mild regurgitation. Estimated RVSP = 27 mmHg.  •  Pulmonic Valve: Normal structure. Trace regurgitation.  •  IVC/SVC: Inferior vena cava is <2.1cm. Inferior vena cava collapses >50% during inspiration.  •  Pericardium: Normal structure.      ECG.  Normal sinus rhythm.  Normal EKG.     Assessment/Plan    Paroxysmal atrial fibrillation (CMS/HCC)  Shalini is reluctant to take the higher dose of Eliquis.  Given her history of GI bleeding, I discussed with her undergoing an atrial occlusion device such as a watchman.  Consideration can also be given to stopping the Eliquis altogether given that she just had 1 episode of atrial fibrillation and pulmonary emboli at the time of an acute illness.    I will have her be evaluated by Dr Carias for possible Watchman device.  I explained about the procedure and what it entails.  She says she would be interested in considering  this.    Essential hypertension  Blood pressure today in the office was 118/68.  She will continue on the Toprol XL 25 mg daily.    Other hyperlipidemia  Continue atorvastatin 10 mg daily.  Lipid profile done last month revealed a total cholesterol of 154, triglycerides 196, HDL 55 and LDL 58.    I counseled her on a low-fat low-cholesterol diet as well as walking for exercise.    We had a prolonged discussion about these complex clinical issues and went over the various important aspects to consider. All questions were answered.    I will arrange for Shalini to be evaluated for possible watchman.  She will return to see me in 6 months time or sooner if there is a problem.  I will continue to keep you informed of her progress.  She will call with any questions or concerns in the interim.            Thank you for allowing me to participate in the care of this patient.  If you have any questions please don't hesitate to contact me.    I spent 40 minutes on this date of service performing the following activities: obtaining history, performing examination, entering orders, documenting, preparing for visit, obtaining / reviewing records, providing counseling and education and communicating results.     Ron Jose MD [facility]   3/20/2024  5:11 PM

## 2024-03-20 NOTE — ASSESSMENT & PLAN NOTE
Shalini is reluctant to take the higher dose of Eliquis.  Given her history of GI bleeding, I discussed with her undergoing an atrial occlusion device such as a watchman.  Consideration can also be given to stopping the Eliquis altogether given that she just had 1 episode of atrial fibrillation and pulmonary emboli at the time of an acute illness.    I will have her be evaluated by Dr Carias for possible Watchman device.  I explained about the procedure and what it entails.  She says she would be interested in considering this.

## 2024-03-20 NOTE — ASSESSMENT & PLAN NOTE
Continue atorvastatin 10 mg daily.  Lipid profile done last month revealed a total cholesterol of 154, triglycerides 196, HDL 55 and LDL 58.    I counseled her on a low-fat low-cholesterol diet as well as walking for exercise.

## 2024-03-23 NOTE — TELEPHONE ENCOUNTER
Re-ordered MRI/MRCP to be performed as outpatient. If this needs to be changed to MEDICARE/ENROLLED then will need to route to [place] as this will need to be further addressed. Re-ordered MRI for now and can address on Monday.

## 2024-03-23 NOTE — TELEPHONE ENCOUNTER
Wily Carcamo, I was out all last week and seeing this now. I'm not sure what is wrong with the order as I was notified that it was initially ordered for inpatient and needed to be changed to an outpatient order. However, seeing this additional message and now concern for MEDICARE related script? I am not really sure what this means. I went ahead and re-ordered the MRI/MRCP as outpatient but when you have a chance would you mind please looking into this. Thanks

## 2024-03-27 ENCOUNTER — TELEPHONE (OUTPATIENT)
Dept: GASTROENTEROLOGY | Facility: HOSPITAL | Age: 89
End: 2024-03-27
Payer: MEDICARE

## 2024-03-27 NOTE — TELEPHONE ENCOUNTER
Telephone Call - Blood Work / Scheduling MRI:    Attempted to reach patient yesterday and again this afternoon but could not reach patient. Left voicemail during second attempted call regarding recent normal blood and reassuring Hgb. Appears patient scheduled her MRI/MRCP WWO contrast given previous concern for prior incorrect order (issue with outpatient MRI order resulting in scheduling delay). Advised patient to call back the [facility] as there was previous concern for nausea as well however could not reach patient despite several attempts on 3/14/24. Will await results of MRI and once again follow up with patient.        Emir Rhoades DO  Gastroenterology Fellow, PGY-VI  Pager [phone]

## 2024-03-29 NOTE — TELEPHONE ENCOUNTER
Pt stated that she did not complete the MRI due to them trying to take blood again. She didn't think it ws necessary because she had just got blood drawn a couple days before.     The appt was rescheduled for 04/05/2024 at [place].   Pt asked if the blood work was necessary? If not, can the order be changed?    Pt was returning the Dr's call.

## 2024-04-01 NOTE — TELEPHONE ENCOUNTER
Called patient this morning, unable to reach patient. Left voicemail. She recently obtained blood work prior to her MRI/MRCP as this will be a contrast study. Advised her to call back the [facility] GI clinic if she has any other questions or concerns. Otherwise, will follow-up with her once the MRI/MRCP has been completed.

## 2024-04-05 ENCOUNTER — HOSPITAL ENCOUNTER (OUTPATIENT)
Dept: RADIOLOGY | Age: 89
Discharge: HOME | End: 2024-04-05
Attending: STUDENT IN AN ORGANIZED HEALTH CARE EDUCATION/TRAINING PROGRAM
Payer: MEDICARE

## 2024-04-05 DIAGNOSIS — K83.8 COMMON BILE DUCT DILATATION: ICD-10-CM

## 2024-04-05 RX ORDER — GADOBUTROL 604.72 MG/ML
6.4 INJECTION INTRAVENOUS ONCE
Status: COMPLETED | OUTPATIENT
Start: 2024-04-05 | End: 2024-04-05

## 2024-04-05 RX ORDER — GADOBUTROL 604.72 MG/ML
0.1 INJECTION INTRAVENOUS ONCE
Status: DISCONTINUED | OUTPATIENT
Start: 2024-04-05 | End: 2024-04-05

## 2024-04-05 RX ADMIN — GADOBUTROL 6.4 ML: 604.72 INJECTION INTRAVENOUS at 14:56

## 2024-04-08 ENCOUNTER — TELEPHONE (OUTPATIENT)
Dept: GASTROENTEROLOGY | Facility: HOSPITAL | Age: 89
End: 2024-04-08
Payer: MEDICARE

## 2024-04-08 NOTE — TELEPHONE ENCOUNTER
Telephone Call:    Called patient this afternoon to discuss recent MRI/MRCP (given previous concern for abnormal CT imaging). Unable to reach patient but left voicemail as multiple prior attempts were made to previously reach patient.    Recent MRI/MRCP without any evidence of biliopancreatic duct dilation or obstructing lesion with CBD up to 0.6 cm and PD up to 0.3 cm. 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 As sidebranch IPMN  < 10 mm, can further discuss risks versus benefits of surveillance especially given patient's age with repeat imaging in one year. However, can ultimately be discussed in outpatient setting.    Has follow-up with me in June. Advised to call back the [facility] clinic if she has any other questions or concerns.    Emir Rhoades DO  Gastroenterology Fellow, PGY-VI  Pager [phone]

## 2024-04-10 ENCOUNTER — TELEPHONE (OUTPATIENT)
Dept: INTERNAL MEDICINE | Facility: HOSPITAL | Age: 89
End: 2024-04-10
Payer: MEDICARE

## 2024-04-10 NOTE — TELEPHONE ENCOUNTER
Pt asking for a return call to discuss the results of the CT scan that was taken. Please call the following number: [phone]

## 2024-04-12 RX ORDER — ONDANSETRON 4 MG/1
4 TABLET, ORALLY DISINTEGRATING ORAL EVERY 8 HOURS PRN
Qty: 30 TABLET | Refills: 0 | Status: SHIPPED | OUTPATIENT
Start: 2024-04-12 | End: 2024-05-30 | Stop reason: SDUPTHER

## 2024-04-12 NOTE — TELEPHONE ENCOUNTER
Telephone Call:    Called patient back this morning. Patient reports feeling better without any further nausea but hoping to have a medication in case this were to recur. Will prescribe low dose Zofran PRN for recurrent nausea, but again advised importance of calling the GI clinic as to not mask symptoms.    Additionally, discussed recent MRI/MRCP which was reassuring given concern with previous CT (with doubt duct dilation). Incidental sub-centimeter side-branch IPMN - not cause of symptoms or prior dilation and can discuss repeat imaging / surveillance regarding risks versus benefits of ongoing surveillance as an outpatient.    Has follow-up with me in June. Answered all questions. Patient demonstrated understanding and amenable to plan.    Emir Rhoades, DO  Gastroenterology Fellow, PGY-VI  Pager [phone]

## 2024-04-26 ENCOUNTER — OFFICE VISIT (OUTPATIENT)
Dept: CARDIOLOGY | Facility: CLINIC | Age: 89
End: 2024-04-26
Payer: MEDICARE

## 2024-04-26 VITALS
BODY MASS INDEX: 23.63 KG/M2 | HEART RATE: 78 BPM | WEIGHT: 147 LBS | DIASTOLIC BLOOD PRESSURE: 70 MMHG | HEIGHT: 66 IN | SYSTOLIC BLOOD PRESSURE: 118 MMHG

## 2024-04-26 DIAGNOSIS — K92.2 GASTROINTESTINAL HEMORRHAGE, UNSPECIFIED GASTROINTESTINAL HEMORRHAGE TYPE: ICD-10-CM

## 2024-04-26 DIAGNOSIS — Z79.01 CHRONIC ANTICOAGULATION: ICD-10-CM

## 2024-04-26 DIAGNOSIS — I10 PRIMARY HYPERTENSION: ICD-10-CM

## 2024-04-26 DIAGNOSIS — E78.49 OTHER HYPERLIPIDEMIA: ICD-10-CM

## 2024-04-26 DIAGNOSIS — D64.9 ANEMIA, UNSPECIFIED TYPE: ICD-10-CM

## 2024-04-26 DIAGNOSIS — I10 ESSENTIAL HYPERTENSION: ICD-10-CM

## 2024-04-26 DIAGNOSIS — I48.19 PERSISTENT ATRIAL FIBRILLATION (CMS/HCC): ICD-10-CM

## 2024-04-26 DIAGNOSIS — I48.0 PAROXYSMAL ATRIAL FIBRILLATION (CMS/HCC): Primary | ICD-10-CM

## 2024-04-26 PROBLEM — I51.89 GRADE I DIASTOLIC DYSFUNCTION: Status: RESOLVED | Noted: 2024-02-17 | Resolved: 2024-04-26

## 2024-04-26 PROBLEM — I35.1 NONRHEUMATIC AORTIC VALVE INSUFFICIENCY: Status: ACTIVE | Noted: 2023-03-07

## 2024-04-26 PROBLEM — R00.2 PALPITATIONS: Status: RESOLVED | Noted: 2021-11-09 | Resolved: 2024-04-26

## 2024-04-26 PROBLEM — I05.9 MITRAL VALVE DISEASE: Status: RESOLVED | Noted: 2023-03-07 | Resolved: 2024-04-26

## 2024-04-26 PROBLEM — K92.0 COFFEE GROUND EMESIS: Status: RESOLVED | Noted: 2024-02-17 | Resolved: 2024-04-26

## 2024-04-26 PROBLEM — K92.1 MELENA: Status: RESOLVED | Noted: 2024-02-17 | Resolved: 2024-04-26

## 2024-04-26 PROBLEM — Z01.810 PREOPERATIVE CARDIOVASCULAR EXAMINATION: Status: RESOLVED | Noted: 2024-02-19 | Resolved: 2024-04-26

## 2024-04-26 PROCEDURE — 99215 OFFICE O/P EST HI 40 MIN: CPT | Performed by: INTERNAL MEDICINE

## 2024-04-26 PROCEDURE — 93000 ELECTROCARDIOGRAM COMPLETE: CPT | Performed by: INTERNAL MEDICINE

## 2024-04-26 ASSESSMENT — CHADS2 SCORE
AGE: 75+ (+2 PT.)
HYPERTENSION: YES (+1 PT.)
DIABETES: NO
SEX: FEMALE (+1PT.)
CHADS2 SCORE: 4
CHF: NO
PRIOR STROKE OR TIA OR THROMBOEMBOLISM: NO
VASCULAR DISEASE: NO

## 2024-04-26 ASSESSMENT — ENCOUNTER SYMPTOMS
APPETITE CHANGE: 0
MYALGIAS: 0
ABDOMINAL PAIN: 0
BRUISES/BLEEDS EASILY: 0
DIZZINESS: 0
FATIGUE: 0
PALPITATIONS: 0
ARTHRALGIAS: 0
DYSURIA: 0
SHORTNESS OF BREATH: 0
WHEEZING: 0
NERVOUS/ANXIOUS: 0

## 2024-04-26 NOTE — LETTER
"April 26, 2024     Renée DO Zain  [address]  [place] [address]    Patient: Shalini Kunz  YOB: 1935  Date of Visit: 4/26/2024      Dear Dr. Pittman:    Thank you for referring Shalini Kunz to me for evaluation. Below are my notes for this consultation.    If you have questions, please do not hesitate to call me. I look forward to following your patient along with you.         Sincerely,        Beni Carias MD        CC: MD Eleni Clement MD Esberg, Douglas B, MD  4/26/2024  3:16 PM  Sign when Signing Visit       Electrophysiology       Reason for visit:   Chief Complaint   Patient presents with   • Atrial Fibrillation      HPI  Shalini Kunz is a 88 y.o. female who comes to the office today for consideration of left atrial appendage closure.  She has seen several cardiologists over the past few years and was seen in [place] by Dr. Pablo until 2021 when she started seeing Dr. Jo.  This year, after Dr. Jo retired she started seeing Dr. Guillaume for general cardiovascular care and Dr. Storm for EP.  I attempted to review all of the records from the several cardiologist to get an understanding of her story.  In 2019 she was hospitalized for GI intervention related to pyloric stenosis and suffered aspiration pneumonia with fairly severe pulmonary compromise.  She had a pulmonary embolism at that time as well and was noted to have atrial fibrillation during that hospitalization which terminated spontaneously.  That is the only documented atrial fibrillation she has had.  She was on appropriately dosed Eliquis for a while, but had a significant GI bleed and she chose to decrease her Eliquis to 2.5 mg twice daily.  She had another GI bleed on that dose of Eliquis, but is back on Eliquis 2.5 mg twice daily now.  As far as symptoms are concerned, she gets occasional vague symptoms of \"not feeling right\" that would last for only a few seconds.  She " wore a 3-day monitor back in 2021 without any documented recurrence of atrial fibrillation I do not believe any other recurrences have been documented.  She is now presenting to consider left atrial appendage closure as an alternative to anticoagulation.      Past Medical History:   Diagnosis Date   • Anemia    • Arthritis    • Atrial fibrillation (CMS/HCC)     Only documented episode was during acute illness   • Deep vein thrombosis (CMS/HCC)     2019   • GI (gastrointestinal bleed)    • Hypertension    • Lipid disorder      Past Surgical History:   Procedure Laterality Date   • ABDOMINAL SURGERY      2019   • ADENOIDECTOMY Bilateral    • CHOLECYSTECTOMY  10/03/2011   • EYE SURGERY      2020    • GANGLION CYST EXCISION     • GASTRIC BYPASS     • TONSILLECTOMY       Allergies:  Patient has no known allergies.    Current Outpatient Medications   Medication Sig Dispense Refill   • atorvastatin (LIPITOR) 10 mg tablet TAKE 1/2 TABLET  (5 MG TOTAL) BY MOUTH DAILY. 45 tablet 3   • calcium carbonate-vitamin D3 (CALCIUM 600 WITH VITAMIN D3) 600 mg(1,500mg) -400 unit tablet,chewable Take 600 mg by mouth once.     • cholecalciferol, vitamin D3, 1,000 unit (25 mcg) tablet Take 1 tablet (1,000 Units total) by mouth daily. 90 tablet 3   • cyanocobalamin 1,000 mcg tablet Take 1 tablet (1,000 mcg total) by mouth daily. 90 tablet 3   • ELIQUIS 2.5 mg tablet TAKE 1 TABLET BY MOUTH  TWICE DAILY 180 tablet 3   • metoprolol succinate XL (TOPROL-XL) 25 mg 24 hr tablet Take 1 tablet (25 mg total) by mouth daily. 30 tablet 0   • multivitamin capsule Take 1 capsule by mouth daily.     • omeprazole (PriLOSEC) 40 mg capsule Take 1 capsule (40 mg total) by mouth daily. 90 capsule 3   • ondansetron ODT (ZOFRAN-ODT) 4 mg disintegrating tablet Take 1 tablet (4 mg total) by mouth every 8 (eight) hours as needed for nausea or vomiting for up to 7 days. 30 tablet 0   • SUMAtriptan (IMITREX) 50 mg tablet Take 1 tablet (50 mg total) by mouth once as  needed for migraine. 30 tablet 3   • vitamins A,C,E-zinc-copper (PRESERVISION AREDS) 4,296 mcg-226 mg-90 mg capsule as directed Orally       No current facility-administered medications for this visit.     Social History     Socioeconomic History   • Marital status:      Spouse name: None   • Number of children: None   • Years of education: None   • Highest education level: None   Tobacco Use   • Smoking status: Never   • Smokeless tobacco: Never   Vaping Use   • Vaping Use: Never used   Substance and Sexual Activity   • Alcohol use: Yes     Alcohol/week: 1.0 standard drink of alcohol     Types: 1 Glasses of wine per week     Comment: half one at dinner    • Drug use: Never   • Sexual activity: Defer     Social Determinants of Health     Food Insecurity: No Food Insecurity (2/17/2024)    Hunger Vital Sign    • Worried About Running Out of Food in the Last Year: Never true    • Ran Out of Food in the Last Year: Never true   Transportation Needs: No Transportation Needs (2/19/2024)    PRAPARE - Transportation    • Lack of Transportation (Medical): No    • Lack of Transportation (Non-Medical): No   Housing Stability: Low Risk  (2/19/2024)    Housing Stability Vital Sign    • Unable to Pay for Housing in the Last Year: No    • Number of Places Lived in the Last Year: 1    • Unstable Housing in the Last Year: No     Family History   Problem Relation Age of Onset   • Alzheimer's disease Biological Mother    • Heart failure Biological Father    • No Known Problems Biological Sister      Review of Systems   Constitutional:  Negative for appetite change and fatigue.   HENT:  Negative for hearing loss and nosebleeds.    Eyes:  Negative for visual disturbance.   Respiratory:  Negative for shortness of breath and wheezing.    Cardiovascular:  Negative for chest pain and palpitations.   Gastrointestinal:  Negative for abdominal pain.   Genitourinary:  Negative for dysuria.   Musculoskeletal:  Negative for arthralgias and  myalgias.   Skin:  Negative for rash.   Neurological:  Negative for dizziness.   Hematological:  Does not bruise/bleed easily.   Psychiatric/Behavioral:  The patient is not nervous/anxious.      Vitals:    04/26/24 1406   BP: 118/70   Pulse: 78     Wt Readings from Last 3 Encounters:   04/26/24 66.7 kg (147 lb)   03/19/24 66.2 kg (146 lb)   03/18/24 65.8 kg (145 lb)     Physical Exam  Constitutional:       General: She is not in acute distress.     Appearance: She is well-developed.   HENT:      Head: Atraumatic.   Eyes:      General: No scleral icterus.  Neck:      Thyroid: No thyromegaly.   Cardiovascular:      Rate and Rhythm: Regular rhythm.      Heart sounds: No murmur heard.  Pulmonary:      Effort: No respiratory distress.      Breath sounds: Normal breath sounds.   Abdominal:      Palpations: Abdomen is soft.      Tenderness: There is no abdominal tenderness.   Musculoskeletal:         General: No deformity.   Skin:     Findings: No rash.   Neurological:      Mental Status: She is alert.      Motor: No abnormal muscle tone.          Labs and test results personally reviewed by me:    Lab Results   Component Value Date    WBC 5.37 03/15/2024    HGB 10.4 (L) 03/15/2024    HCT 33.5 (L) 03/15/2024     03/15/2024    CHOL 154 02/02/2024    TRIG 196 (H) 02/02/2024    HDL 57 02/02/2024    LDLCALC 58 02/02/2024    ALT 15 02/27/2024    AST 18 02/27/2024     03/15/2024    K 4.3 03/15/2024    CREATININE 0.8 03/15/2024    BUN 18 03/15/2024    TSH 3.52 02/02/2024    INR 1.2 02/17/2024       Cardiac Imaging    TRANSTHORACIC ECHO (TTE) COMPLETE 03/07/2023    Interpretation Summary  •  Left Ventricle: Normal ventricle size. Normal wall thickness. Estimated EF 55%. No regional wall motion abnormalities. Grade I diastolic dysfunction.  •  Right Ventricle: Normal ventricle size. Low normal systolic function.  •  Left Atrium: Mildly dilated atrium.  •  Right Atrium: Normal sized atrium.  •  Aortic Valve: Tricuspid  valve. Moderate regurgitation. No stenosis.  Aorta top normal 3.8 cm  •  Mitral Valve: Bileaflet thickening. Mild regurgitation. The jet is eccentric.  •  Tricuspid Valve: Normal structure. Mild regurgitation. Estimated RVSP = 27 mmHg.  •  Pulmonic Valve: Normal structure. Trace regurgitation.  •  IVC/SVC: Inferior vena cava is <2.1cm. Inferior vena cava collapses >50% during inspiration.  •  Pericardium: Normal structure.    Most recent stress test results:    STRESS TEST, REGADENOSON W MYOCARDIAL PERFUSION SPECT (MULTI STUDY) 01/29/2024 (Final) 1/29/2024    Interpretation Summary  •  Negative lexiscan ECG test. Nuclear images pending and are to be reported by radiology  •  Response to Stress: There were no arrhythmias during stress. There were no arrhythmias during recovery.        ECG personally read and interpreted by me today: Ectopic atrial rhythm at 78 bpm, otherwise normal.    3-day Holter monitor in 2021 was reported to show no atrial fibrillation.       Paroxysmal atrial fibrillation (CMS/HCC)  She carries a history of paroxysmal atrial fibrillation, but this is based on a single episode that occurred in the setting of an acute illness.  It is possible that she does not have atrial fibrillation at all and that this was due to a reversible cause.  She is currently being undertreated with a subtherapeutic dose of Eliquis.  She is aware that this is a subtherapeutic dose and has chosen it.  I think in the short-term this is okay, but in the long-term she should either have appropriate thromboembolic prevention or she will not need it at all.      We spoke about various ways to rule out recurrent atrial fibrillation including a wearable device, long-term Holter and implantable loop recorder.  I favor implantable loop recorder and we eventually did settle on that as her course of treatment.  She will continue Eliquis 2.5 mg twice daily for now and schedule implantable loop recorder.  If the device shows atrial  fibrillation, she would be a good candidate for left atrial appendage closure using the Watchman device.  We also spoke in detail about Watchman today, so she should have some understanding of that device if it is recommended in the future.  If the loop recorder does not show any atrial fibrillation, Eliquis should be discontinued entirely.  She will schedule that procedure at her earliest convenience.    Gastrointestinal hemorrhage, unspecified gastrointestinal hemorrhage type  She has had at least 2 significant GI bleeds.  She seems to be tolerating a subtherapeutic dose of Eliquis, but she likely will not tolerate full anticoagulation over the long-term.    Primary hypertension  Her blood pressure is well-controlled on the current medical regimen.        This letter was generated using speech recognition software. Please excuse any typographical errors.       Beni Carias MD  4/26/2024

## 2024-04-26 NOTE — ASSESSMENT & PLAN NOTE
She carries a history of paroxysmal atrial fibrillation, but this is based on a single episode that occurred in the setting of an acute illness.  It is possible that she does not have atrial fibrillation at all and that this was due to a reversible cause.  She is currently being undertreated with a subtherapeutic dose of Eliquis.  She is aware that this is a subtherapeutic dose and has chosen it.  I think in the short-term this is okay, but in the long-term she should either have appropriate thromboembolic prevention or she will not need it at all.      We spoke about various ways to rule out recurrent atrial fibrillation including a wearable device, long-term Holter and implantable loop recorder.  I favor implantable loop recorder and we eventually did settle on that as her course of treatment.  She will continue Eliquis 2.5 mg twice daily for now and schedule implantable loop recorder.  If the device shows atrial fibrillation, she would be a good candidate for left atrial appendage closure using the Watchman device.  We also spoke in detail about Watchman today, so she should have some understanding of that device if it is recommended in the future.  If the loop recorder does not show any atrial fibrillation, Eliquis should be discontinued entirely.  She will schedule that procedure at her earliest convenience.

## 2024-04-26 NOTE — H&P (VIEW-ONLY)
"     Electrophysiology       Reason for visit:   Chief Complaint   Patient presents with    Atrial Fibrillation      HPI  Shalini Kunz is a 88 y.o. female who comes to the office today for consideration of left atrial appendage closure.  She has seen several cardiologists over the past few years and was seen in [place] by Dr. Pablo until 2021 when she started seeing Dr. Jo.  This year, after Dr. Jo retired she started seeing Dr. Guillaume for general cardiovascular care and Dr. Storm for EP.  I attempted to review all of the records from the several cardiologist to get an understanding of her story.  In 2019 she was hospitalized for GI intervention related to pyloric stenosis and suffered aspiration pneumonia with fairly severe pulmonary compromise.  She had a pulmonary embolism at that time as well and was noted to have atrial fibrillation during that hospitalization which terminated spontaneously.  That is the only documented atrial fibrillation she has had.  She was on appropriately dosed Eliquis for a while, but had a significant GI bleed and she chose to decrease her Eliquis to 2.5 mg twice daily.  She had another GI bleed on that dose of Eliquis, but is back on Eliquis 2.5 mg twice daily now.  As far as symptoms are concerned, she gets occasional vague symptoms of \"not feeling right\" that would last for only a few seconds.  She wore a 3-day monitor back in 2021 without any documented recurrence of atrial fibrillation I do not believe any other recurrences have been documented.  She is now presenting to consider left atrial appendage closure as an alternative to anticoagulation.      Past Medical History:   Diagnosis Date    Anemia     Arthritis     Atrial fibrillation (CMS/HCC)     Only documented episode was during acute illness    Deep vein thrombosis (CMS/HCC)     2019    GI (gastrointestinal bleed)     Hypertension     Lipid disorder      Past Surgical History:   Procedure Laterality Date    " ABDOMINAL SURGERY      2019    ADENOIDECTOMY Bilateral     CHOLECYSTECTOMY  10/03/2011    EYE SURGERY      2020     GANGLION CYST EXCISION      GASTRIC BYPASS      TONSILLECTOMY       Allergies:  Patient has no known allergies.    Current Outpatient Medications   Medication Sig Dispense Refill    atorvastatin (LIPITOR) 10 mg tablet TAKE 1/2 TABLET  (5 MG TOTAL) BY MOUTH DAILY. 45 tablet 3    calcium carbonate-vitamin D3 (CALCIUM 600 WITH VITAMIN D3) 600 mg(1,500mg) -400 unit tablet,chewable Take 600 mg by mouth once.      cholecalciferol, vitamin D3, 1,000 unit (25 mcg) tablet Take 1 tablet (1,000 Units total) by mouth daily. 90 tablet 3    cyanocobalamin 1,000 mcg tablet Take 1 tablet (1,000 mcg total) by mouth daily. 90 tablet 3    ELIQUIS 2.5 mg tablet TAKE 1 TABLET BY MOUTH  TWICE DAILY 180 tablet 3    metoprolol succinate XL (TOPROL-XL) 25 mg 24 hr tablet Take 1 tablet (25 mg total) by mouth daily. 30 tablet 0    multivitamin capsule Take 1 capsule by mouth daily.      omeprazole (PriLOSEC) 40 mg capsule Take 1 capsule (40 mg total) by mouth daily. 90 capsule 3    ondansetron ODT (ZOFRAN-ODT) 4 mg disintegrating tablet Take 1 tablet (4 mg total) by mouth every 8 (eight) hours as needed for nausea or vomiting for up to 7 days. 30 tablet 0    SUMAtriptan (IMITREX) 50 mg tablet Take 1 tablet (50 mg total) by mouth once as needed for migraine. 30 tablet 3    vitamins A,C,E-zinc-copper (PRESERVISION AREDS) 4,296 mcg-226 mg-90 mg capsule as directed Orally       No current facility-administered medications for this visit.     Social History     Socioeconomic History    Marital status:      Spouse name: None    Number of children: None    Years of education: None    Highest education level: None   Tobacco Use    Smoking status: Never    Smokeless tobacco: Never   Vaping Use    Vaping Use: Never used   Substance and Sexual Activity    Alcohol use: Yes     Alcohol/week: 1.0 standard drink of alcohol     Types: 1  Glasses of wine per week     Comment: half one at dinner     Drug use: Never    Sexual activity: Defer     Social Determinants of Health     Food Insecurity: No Food Insecurity (2/17/2024)    Hunger Vital Sign     Worried About Running Out of Food in the Last Year: Never true     Ran Out of Food in the Last Year: Never true   Transportation Needs: No Transportation Needs (2/19/2024)    PRAPARE - Transportation     Lack of Transportation (Medical): No     Lack of Transportation (Non-Medical): No   Housing Stability: Low Risk  (2/19/2024)    Housing Stability Vital Sign     Unable to Pay for Housing in the Last Year: No     Number of Places Lived in the Last Year: 1     Unstable Housing in the Last Year: No     Family History   Problem Relation Age of Onset    Alzheimer's disease Biological Mother     Heart failure Biological Father     No Known Problems Biological Sister      Review of Systems   Constitutional:  Negative for appetite change and fatigue.   HENT:  Negative for hearing loss and nosebleeds.    Eyes:  Negative for visual disturbance.   Respiratory:  Negative for shortness of breath and wheezing.    Cardiovascular:  Negative for chest pain and palpitations.   Gastrointestinal:  Negative for abdominal pain.   Genitourinary:  Negative for dysuria.   Musculoskeletal:  Negative for arthralgias and myalgias.   Skin:  Negative for rash.   Neurological:  Negative for dizziness.   Hematological:  Does not bruise/bleed easily.   Psychiatric/Behavioral:  The patient is not nervous/anxious.      Vitals:    04/26/24 1406   BP: 118/70   Pulse: 78     Wt Readings from Last 3 Encounters:   04/26/24 66.7 kg (147 lb)   03/19/24 66.2 kg (146 lb)   03/18/24 65.8 kg (145 lb)     Physical Exam  Constitutional:       General: She is not in acute distress.     Appearance: She is well-developed.   HENT:      Head: Atraumatic.   Eyes:      General: No scleral icterus.  Neck:      Thyroid: No thyromegaly.   Cardiovascular:       Rate and Rhythm: Regular rhythm.      Heart sounds: No murmur heard.  Pulmonary:      Effort: No respiratory distress.      Breath sounds: Normal breath sounds.   Abdominal:      Palpations: Abdomen is soft.      Tenderness: There is no abdominal tenderness.   Musculoskeletal:         General: No deformity.   Skin:     Findings: No rash.   Neurological:      Mental Status: She is alert.      Motor: No abnormal muscle tone.          Labs and test results personally reviewed by me:    Lab Results   Component Value Date    WBC 5.37 03/15/2024    HGB 10.4 (L) 03/15/2024    HCT 33.5 (L) 03/15/2024     03/15/2024    CHOL 154 02/02/2024    TRIG 196 (H) 02/02/2024    HDL 57 02/02/2024    LDLCALC 58 02/02/2024    ALT 15 02/27/2024    AST 18 02/27/2024     03/15/2024    K 4.3 03/15/2024    CREATININE 0.8 03/15/2024    BUN 18 03/15/2024    TSH 3.52 02/02/2024    INR 1.2 02/17/2024       Cardiac Imaging    TRANSTHORACIC ECHO (TTE) COMPLETE 03/07/2023    Interpretation Summary    Left Ventricle: Normal ventricle size. Normal wall thickness. Estimated EF 55%. No regional wall motion abnormalities. Grade I diastolic dysfunction.    Right Ventricle: Normal ventricle size. Low normal systolic function.    Left Atrium: Mildly dilated atrium.    Right Atrium: Normal sized atrium.    Aortic Valve: Tricuspid valve. Moderate regurgitation. No stenosis.  Aorta top normal 3.8 cm    Mitral Valve: Bileaflet thickening. Mild regurgitation. The jet is eccentric.    Tricuspid Valve: Normal structure. Mild regurgitation. Estimated RVSP = 27 mmHg.    Pulmonic Valve: Normal structure. Trace regurgitation.    IVC/SVC: Inferior vena cava is <2.1cm. Inferior vena cava collapses >50% during inspiration.    Pericardium: Normal structure.    Most recent stress test results:    STRESS TEST, REGADENOSON W MYOCARDIAL PERFUSION SPECT (MULTI STUDY) 01/29/2024 (Final) 1/29/2024    Interpretation Summary    Negative lexiscan ECG test. Nuclear  images pending and are to be reported by radiology    Response to Stress: There were no arrhythmias during stress. There were no arrhythmias during recovery.        ECG personally read and interpreted by me today: Ectopic atrial rhythm at 78 bpm, otherwise normal.    3-day Holter monitor in 2021 was reported to show no atrial fibrillation.       Paroxysmal atrial fibrillation (CMS/HCC)  She carries a history of paroxysmal atrial fibrillation, but this is based on a single episode that occurred in the setting of an acute illness.  It is possible that she does not have atrial fibrillation at all and that this was due to a reversible cause.  She is currently being undertreated with a subtherapeutic dose of Eliquis.  She is aware that this is a subtherapeutic dose and has chosen it.  I think in the short-term this is okay, but in the long-term she should either have appropriate thromboembolic prevention or she will not need it at all.      We spoke about various ways to rule out recurrent atrial fibrillation including a wearable device, long-term Holter and implantable loop recorder.  I favor implantable loop recorder and we eventually did settle on that as her course of treatment.  She will continue Eliquis 2.5 mg twice daily for now and schedule implantable loop recorder.  If the device shows atrial fibrillation, she would be a good candidate for left atrial appendage closure using the Watchman device.  We also spoke in detail about Watchman today, so she should have some understanding of that device if it is recommended in the future.  If the loop recorder does not show any atrial fibrillation, Eliquis should be discontinued entirely.  She will schedule that procedure at her earliest convenience.    Gastrointestinal hemorrhage, unspecified gastrointestinal hemorrhage type  She has had at least 2 significant GI bleeds.  She seems to be tolerating a subtherapeutic dose of Eliquis, but she likely will not tolerate full  anticoagulation over the long-term.    Primary hypertension  Her blood pressure is well-controlled on the current medical regimen.        This letter was generated using speech recognition software. Please excuse any typographical errors.       Beni Carias MD  4/26/2024

## 2024-04-26 NOTE — ASSESSMENT & PLAN NOTE
She has had at least 2 significant GI bleeds.  She seems to be tolerating a subtherapeutic dose of Eliquis, but she likely will not tolerate full anticoagulation over the long-term.

## 2024-05-01 ENCOUNTER — HOSPITAL ENCOUNTER (OUTPATIENT)
Facility: HOSPITAL | Age: 89
Setting detail: HOSPITAL OUTPATIENT SURGERY
Discharge: HOME | End: 2024-05-01
Attending: INTERNAL MEDICINE | Admitting: INTERNAL MEDICINE
Payer: MEDICARE

## 2024-05-01 VITALS
SYSTOLIC BLOOD PRESSURE: 142 MMHG | RESPIRATION RATE: 29 BRPM | OXYGEN SATURATION: 94 % | HEART RATE: 76 BPM | DIASTOLIC BLOOD PRESSURE: 64 MMHG

## 2024-05-01 DIAGNOSIS — I48.0 PAROXYSMAL ATRIAL FIBRILLATION (CMS/HCC): ICD-10-CM

## 2024-05-01 PROBLEM — Z95.818 IMPLANTABLE LOOP RECORDER PRESENT: Status: ACTIVE | Noted: 2024-05-01

## 2024-05-01 LAB
ATRIAL RATE: 65
PR INTERVAL: 170
QRS DURATION: 80
QT INTERVAL: 396
QTC CALCULATION(BAZETT): 411
R AXIS: 40
T WAVE AXIS: 43
VENTRICULAR RATE: 65

## 2024-05-01 PROCEDURE — 200200 ECG 12-LEAD: Performed by: INTERNAL MEDICINE

## 2024-05-01 PROCEDURE — 0JH632Z INSERTION OF MONITORING DEVICE INTO CHEST SUBCUTANEOUS TISSUE AND FASCIA, PERCUTANEOUS APPROACH: ICD-10-PCS | Performed by: INTERNAL MEDICINE

## 2024-05-01 PROCEDURE — 33285 INSJ SUBQ CAR RHYTHM MNTR: CPT | Performed by: INTERNAL MEDICINE

## 2024-05-01 PROCEDURE — C1764 EVENT RECORDER, CARDIAC: HCPCS | Performed by: INTERNAL MEDICINE

## 2024-05-01 PROCEDURE — 93005 ELECTROCARDIOGRAM TRACING: CPT | Performed by: INTERNAL MEDICINE

## 2024-05-01 PROCEDURE — 25000000 HC PHARMACY GENERAL: Performed by: INTERNAL MEDICINE

## 2024-05-01 DEVICE — INSERTABLE CARDIAC MONITOR
Type: IMPLANTABLE DEVICE | Site: CHEST | Status: FUNCTIONAL
Brand: LUX-DX II+™

## 2024-05-01 RX ORDER — LIDOCAINE HYDROCHLORIDE AND EPINEPHRINE 10; 10 UG/ML; MG/ML
INJECTION, SOLUTION INFILTRATION; PERINEURAL
Status: DISCONTINUED | OUTPATIENT
Start: 2024-05-01 | End: 2024-05-01 | Stop reason: HOSPADM

## 2024-05-01 NOTE — DISCHARGE INSTRUCTIONS
Loop Discharge Instructions:    * Returning to work, driving, and other daily activities should be discussed with your doctor before discharge from the hospital. Generally, driving is safe after 24 hours unless you are taking narcotics for pain relief.    * Notify your doctor immediately if you notice any of the following: fever, chills, warmth, redness, swelling, or drainage at your incision.    * Your loop recorder is MRI compatible, however we recommend performing a manual download/ remote interrogation prior to your study.    * Place the temporary identification card in your wallet at time of discharge and replace it with your permanent card once it is mailed to your home in four to eight weeks. Make sure you inform healthcare providers that you have an implanted device, particularly if surgical procedures are scheduled.    * Please read your device 's information booklet for future reference. Write down any questions you may have and bring a list with you to your follow-up appointment.    * You were provided instructions for use of your home monitoring system. This was also demonstrated for you. Please review the information and connect the monitor at your bedside, if applicable. Perform a manual transmission tomorrow morning, as described in the instructions.     * Please keep all appointments to have your device checked. Your office device evaluations will usually be every 6-12 months.    WOUND CARE:    * You can remove your dressing in 24 hours.    * Keep your incision dry for 24 hours. When showering after that, keep the incision out of a direct stream of water for one week, and use soap and water only. Do not scrub the incision. Instead, pat it dry. Do not apply any creams, lotions, or powders to your incision.    * If your incision has steri-strips, do not remove them. They will fall off on their own.    * If your incision has medical glue, it will flake off on its own. Do not pick it  off.    * You should avoid submerging the incision under water such as in a hot tub, bath tub, swimming pool, or ocean until your incision is completely healed in 6 weeks.    THINGS YOU SHOULD AVOID:    * Security systems at airports do not interfere with your device. However, your device is made of metal and will therefore set off metal detectors. Have your device identification card ready for presentation to the . They will search the rest of your body using a handheld wand.    * Do not push on the area around your device or twist the device under the skin.      Follow up with Dr Carias  [phone]

## 2024-05-01 NOTE — Clinical Note
The left chest was and prepped with ChloraPrep. The patient was draped in a sterile fashion after allowing for the recommended dry time.

## 2024-05-02 ENCOUNTER — TELEPHONE (OUTPATIENT)
Dept: SCHEDULING | Facility: CLINIC | Age: 89
End: 2024-05-02
Payer: MEDICARE

## 2024-05-02 NOTE — TELEPHONE ENCOUNTER
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    Loop implant yesterday.    Pt phoned to ask if she hooked up her home monitoring equipment correctly? I confirmed she has a phone # for [employer] so she will reach out to them.    Just an FYI-if you have any questions, pt can be reached at [phone] but won't be available from 11am-12 noon and then again from 2:15pm-4:15pm

## 2024-05-03 ENCOUNTER — TELEPHONE (OUTPATIENT)
Dept: CARDIOLOGY | Facility: CLINIC | Age: 89
End: 2024-05-03
Payer: MEDICARE

## 2024-05-23 RX ORDER — ONDANSETRON 4 MG/1
4 TABLET, ORALLY DISINTEGRATING ORAL EVERY 8 HOURS PRN
Qty: 30 TABLET | Refills: 0 | OUTPATIENT
Start: 2024-05-23 | End: 2024-06-02

## 2024-05-23 NOTE — TELEPHONE ENCOUNTER
Medicine Refill Request    Last Office Visit: 2/22/2024   Last Consult Visit: Visit date not found  Last Telemedicine Visit: Visit date not found    Next Appointment: 6/27/2024      Current Outpatient Medications:     atorvastatin (LIPITOR) 10 mg tablet, TAKE 1/2 TABLET  (5 MG TOTAL) BY MOUTH DAILY., Disp: 45 tablet, Rfl: 3    calcium carbonate-vitamin D3 (CALCIUM 600 WITH VITAMIN D3) 600 mg(1,500mg) -400 unit tablet,chewable, Take 600 mg by mouth once., Disp: , Rfl:     cholecalciferol, vitamin D3, 1,000 unit (25 mcg) tablet, Take 1 tablet (1,000 Units total) by mouth daily., Disp: 90 tablet, Rfl: 3    cyanocobalamin 1,000 mcg tablet, Take 1 tablet (1,000 mcg total) by mouth daily., Disp: 90 tablet, Rfl: 3    ELIQUIS 2.5 mg tablet, TAKE 1 TABLET BY MOUTH  TWICE DAILY, Disp: 180 tablet, Rfl: 3    metoprolol succinate XL (TOPROL-XL) 25 mg 24 hr tablet, Take 1 tablet (25 mg total) by mouth daily., Disp: 30 tablet, Rfl: 0    multivitamin capsule, Take 1 capsule by mouth daily., Disp: , Rfl:     omeprazole (PriLOSEC) 40 mg capsule, Take 1 capsule (40 mg total) by mouth daily., Disp: 90 capsule, Rfl: 3    ondansetron ODT (ZOFRAN-ODT) 4 mg disintegrating tablet, Take 1 tablet (4 mg total) by mouth every 8 (eight) hours as needed for nausea or vomiting for up to 7 days., Disp: 30 tablet, Rfl: 0    SUMAtriptan (IMITREX) 50 mg tablet, Take 1 tablet (50 mg total) by mouth once as needed for migraine., Disp: 30 tablet, Rfl: 3      BP Readings from Last 3 Encounters:   05/01/24 (!) 142/64   04/26/24 118/70   03/19/24 118/68       Recent Lab results:  Lab Results   Component Value Date    CHOL 154 02/02/2024   ,   Lab Results   Component Value Date    HDL 57 02/02/2024   ,   Lab Results   Component Value Date    LDLCALC 58 02/02/2024   ,   Lab Results   Component Value Date    TRIG 196 (H) 02/02/2024        Lab Results   Component Value Date    GLUCOSE 92 03/15/2024   ,   Lab Results   Component Value Date    HGBA1C 5.4  02/02/2024         Lab Results   Component Value Date    CREATININE 0.8 03/15/2024       Lab Results   Component Value Date    TSH 3.52 02/02/2024           Lab Results   Component Value Date    HGBA1C 5.4 02/02/2024

## 2024-05-28 NOTE — TELEPHONE ENCOUNTER
Medicine Refill Request    Last Office Visit: Visit date not found   Last Consult Visit: Visit date not found  Last Telemedicine Visit: Visit date not found    Next Appointment: Visit date not found      Current Outpatient Medications:     atorvastatin (LIPITOR) 10 mg tablet, TAKE 1/2 TABLET  (5 MG TOTAL) BY MOUTH DAILY., Disp: 45 tablet, Rfl: 3    calcium carbonate-vitamin D3 (CALCIUM 600 WITH VITAMIN D3) 600 mg(1,500mg) -400 unit tablet,chewable, Take 600 mg by mouth once., Disp: , Rfl:     cholecalciferol, vitamin D3, 1,000 unit (25 mcg) tablet, Take 1 tablet (1,000 Units total) by mouth daily., Disp: 90 tablet, Rfl: 3    cyanocobalamin 1,000 mcg tablet, Take 1 tablet (1,000 mcg total) by mouth daily., Disp: 90 tablet, Rfl: 3    ELIQUIS 2.5 mg tablet, TAKE 1 TABLET BY MOUTH  TWICE DAILY, Disp: 180 tablet, Rfl: 3    metoprolol succinate XL (TOPROL-XL) 25 mg 24 hr tablet, Take 1 tablet (25 mg total) by mouth daily., Disp: 30 tablet, Rfl: 0    multivitamin capsule, Take 1 capsule by mouth daily., Disp: , Rfl:     omeprazole (PriLOSEC) 40 mg capsule, Take 1 capsule (40 mg total) by mouth daily., Disp: 90 capsule, Rfl: 3    ondansetron ODT (ZOFRAN-ODT) 4 mg disintegrating tablet, Take 1 tablet (4 mg total) by mouth every 8 (eight) hours as needed for nausea or vomiting for up to 7 days., Disp: 30 tablet, Rfl: 0    SUMAtriptan (IMITREX) 50 mg tablet, Take 1 tablet (50 mg total) by mouth once as needed for migraine., Disp: 30 tablet, Rfl: 3      BP Readings from Last 3 Encounters:   05/01/24 (!) 142/64   04/26/24 118/70   03/19/24 118/68       Recent Lab results:  Lab Results   Component Value Date    CHOL 154 02/02/2024   ,   Lab Results   Component Value Date    HDL 57 02/02/2024   ,   Lab Results   Component Value Date    LDLCALC 58 02/02/2024   ,   Lab Results   Component Value Date    TRIG 196 (H) 02/02/2024        Lab Results   Component Value Date    GLUCOSE 92 03/15/2024   ,   Lab Results   Component Value Date     HGBA1C 5.4 02/02/2024         Lab Results   Component Value Date    CREATININE 0.8 03/15/2024       Lab Results   Component Value Date    TSH 3.52 02/02/2024           Lab Results   Component Value Date    HGBA1C 5.4 02/02/2024

## 2024-05-30 RX ORDER — ONDANSETRON 4 MG/1
4 TABLET, ORALLY DISINTEGRATING ORAL EVERY 8 HOURS PRN
Qty: 30 TABLET | Refills: 0 | Status: SHIPPED | OUTPATIENT
Start: 2024-05-30 | End: 2024-08-05

## 2024-06-21 ENCOUNTER — OFFICE VISIT (OUTPATIENT)
Dept: INTERNAL MEDICINE | Facility: CLINIC | Age: 89
End: 2024-06-21
Payer: MEDICARE

## 2024-06-21 DIAGNOSIS — D64.9 ANEMIA, UNSPECIFIED TYPE: ICD-10-CM

## 2024-06-21 DIAGNOSIS — I48.0 PAROXYSMAL ATRIAL FIBRILLATION (CMS/HCC): ICD-10-CM

## 2024-06-21 DIAGNOSIS — E53.8 B12 DEFICIENCY: ICD-10-CM

## 2024-06-21 DIAGNOSIS — Z95.818 IMPLANTABLE LOOP RECORDER PRESENT: ICD-10-CM

## 2024-06-21 DIAGNOSIS — Z79.01 CHRONIC ANTICOAGULATION: ICD-10-CM

## 2024-06-21 DIAGNOSIS — E78.49 OTHER HYPERLIPIDEMIA: ICD-10-CM

## 2024-06-21 DIAGNOSIS — E55.9 VITAMIN D DEFICIENCY: ICD-10-CM

## 2024-06-21 DIAGNOSIS — R79.9 ABNORMAL FINDING OF BLOOD CHEMISTRY, UNSPECIFIED: ICD-10-CM

## 2024-06-21 DIAGNOSIS — Z12.31 ENCOUNTER FOR SCREENING MAMMOGRAM FOR MALIGNANT NEOPLASM OF BREAST: ICD-10-CM

## 2024-06-21 DIAGNOSIS — I48.19 PERSISTENT ATRIAL FIBRILLATION (CMS/HCC): Primary | ICD-10-CM

## 2024-06-21 DIAGNOSIS — I10 ESSENTIAL HYPERTENSION: ICD-10-CM

## 2024-06-21 PROCEDURE — 99214 OFFICE O/P EST MOD 30 MIN: CPT | Performed by: FAMILY MEDICINE

## 2024-06-21 PROCEDURE — G2211 COMPLEX E/M VISIT ADD ON: HCPCS | Performed by: FAMILY MEDICINE

## 2024-06-21 NOTE — PROGRESS NOTES
OFFICE VISIT NOTE     LOAN BRAN DO        PATIENT NAME:Shalini Kunz  PATIENT : 1935  ANNE MARIE: 2024    CC: routine followup.         HPI   Shalini Kunz is a 88 y.o. female  With HTN, SVT and pyloric stricture seen for ER followup . She is a [facility] resident living with her .     She was admitted to the hospital 2024 through 2024.  She presented with coffee-ground emesis and diarrhea and was found with hgb drop 10/5->8.7 cf a GI bleed.  Of note she is on Eliquis subtherapeutic dose for atrial fibrillation for prior GI bleed history.  She received 1 unit PRBC and underwent an EGD  that did not show acute bleed did show erythema at her GJ anastomosis site and a colonoscopy on  that also did not show an acute bleed.  Gastroenterology recommended lifelong proton pump inhibitor therapy and outpatient follow-up.  Her anticoagulant Eliquis was resumed on discharge with a hemoglobin of 8.9.    Since discharge she was seen by gastroenterology Dr. Joseph Rhoades on 2024.  We reviewed abnormality of her CT abdomen pelvis showing moderate bile duct dilatation and mild pancreatic duct dilatation recommending follow-up MRI- Gi did not feel this was concerning but did order MRI 2024 that did not show biliopancreatic duct dilation or obstructing lesions.  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 Recommended lifelong PPI omeprazol 20mg daily  and surveillance.       She waas seen by dr pyle and ILR completed 24. Plan is to consider wathcman procedure if ILR shows afib burden and if none then DC AC.        Specialist   cardiology dr san  audiology-[place]  podiatry-[place]  [facility]heum dr penn  [facility]- dr ken waller-> dr keyanna white  ent dr jose alfredo barba    #afib  #HTN  - dr duque -> dr. san  - eliquis 2.5 po bid, metoprolol 25mg po daily    #hx of PE    #HLD- lipitor 5mg po daily        SOCIAL  HISTORY:  yusuf marlow   lives with    nightly wine      FUNCTIONAL HISTORY:  ADL   Feeding-I  Toileting-I  Dressing-I  Bathing-I  Transferring-I    IADL  Medications-I  Shopping-I  Finances-I  Cooking-I  Cleaning-I        ADVANCED CARE PLANNING:  Full           HEALTH MAINTENANCE    -- Pneumonia Immunization:utd   -- Influenza Immunization:utd  -- Shingles Immunization:  -- Colorectal cancer screening:completed  -- Breast Cancer screening:reviewed goc.  She tells me that she would like to continue mammograms  8/22/23  -- Cervical cancer screening:completed  -- Bone Health screening: Tells me she would like to continue DEXA scans 8/22/23 osteoporosis   -- Vision Screening:dr tse   -- Hearing Screening:  -- RSV vaccination: UTD      The following have been reviewed and updated as appropriate in this visit: active problem list, medication list, allergies, family history, social history, health maintenance            Past Medical History:   Diagnosis Date    Anemia     Arthritis     Atrial fibrillation (CMS/HCC)     Only documented episode was during acute illness    Deep vein thrombosis (CMS/HCC)     2019    GI (gastrointestinal bleed)     Hypertension     Lipid disorder        Past Surgical History:   Procedure Laterality Date    ABDOMINAL SURGERY      2019    ADENOIDECTOMY Bilateral     CHOLECYSTECTOMY  10/03/2011    EYE SURGERY      2020     GANGLION CYST EXCISION      GASTRIC BYPASS      TONSILLECTOMY       Social History     Socioeconomic History    Marital status:      Spouse name: Not on file    Number of children: Not on file    Years of education: Not on file    Highest education level: Not on file   Occupational History    Not on file   Tobacco Use    Smoking status: Never    Smokeless tobacco: Never   Vaping Use    Vaping Use: Never used   Substance and Sexual Activity    Alcohol use: Yes     Alcohol/week: 1.0 standard drink of alcohol     Types: 1 Glasses of wine per week     Comment: half  one at dinner     Drug use: Never    Sexual activity: Defer   Other Topics Concern    Not on file   Social History Narrative    Not on file     Social Determinants of Health     Financial Resource Strain: Not on file   Food Insecurity: No Food Insecurity (2/17/2024)    Hunger Vital Sign     Worried About Running Out of Food in the Last Year: Never true     Ran Out of Food in the Last Year: Never true   Transportation Needs: No Transportation Needs (2/19/2024)    PRAPARE - Transportation     Lack of Transportation (Medical): No     Lack of Transportation (Non-Medical): No   Physical Activity: Not on file   Stress: Not on file   Social Connections: Not on file   Intimate Partner Violence: Not on file   Housing Stability: Low Risk  (2/19/2024)    Housing Stability Vital Sign     Unable to Pay for Housing in the Last Year: No     Number of Places Lived in the Last Year: 1     Unstable Housing in the Last Year: No         Family History   Problem Relation Age of Onset    Alzheimer's disease Biological Mother     Heart failure Biological Father     No Known Problems Biological Sister          No Known Allergies      Current Outpatient Medications   Medication Sig Dispense Refill    atorvastatin (LIPITOR) 10 mg tablet TAKE 1/2 TABLET  (5 MG TOTAL) BY MOUTH DAILY. 45 tablet 3    calcium carbonate-vitamin D3 (CALCIUM 600 WITH VITAMIN D3) 600 mg(1,500mg) -400 unit tablet,chewable Take 600 mg by mouth once.      cholecalciferol, vitamin D3, 1,000 unit (25 mcg) tablet Take 1 tablet (1,000 Units total) by mouth daily. 90 tablet 3    cyanocobalamin 1,000 mcg tablet Take 1 tablet (1,000 mcg total) by mouth daily. 90 tablet 3    ELIQUIS 2.5 mg tablet TAKE 1 TABLET BY MOUTH  TWICE DAILY 180 tablet 3    metoprolol succinate XL (TOPROL-XL) 25 mg 24 hr tablet Take 1 tablet (25 mg total) by mouth daily. 30 tablet 0    multivitamin capsule Take 1 capsule by mouth daily.      omeprazole (PriLOSEC) 40 mg capsule Take 1 capsule (40 mg  "total) by mouth daily. 90 capsule 3    ondansetron ODT (ZOFRAN-ODT) 4 mg disintegrating tablet Take 1 tablet (4 mg total) by mouth every 8 (eight) hours as needed for nausea or vomiting for up to 7 days. 30 tablet 0    ondansetron ODT (ZOFRAN-ODT) 4 mg disintegrating tablet Take 1 tablet (4 mg total) by mouth every 8 (eight) hours as needed for nausea or vomiting for up to 7 days. 30 tablet 0    SUMAtriptan (IMITREX) 50 mg tablet Take 1 tablet (50 mg total) by mouth once as needed for migraine. 30 tablet 3     No current facility-administered medications for this visit.       Review of Systems  ROS  See hpi.   General: Denies fatigue, weight loss or weight gain.    Head: Denies headaches trauma   Eyes: Denies blurry vision, diplopia, decreased vision.  Denies drainage or excessive tearing.  Ears: Denies tinnitis, decreased hearing, ear drainage  Nose: Denies rhinorrhea, epistaxi  Mouth: Denies dry mouth  Neck: Denies lumps, bumps.  Denies dysphagia, odynophagia  Pulmonary:  Denies shortness of breath, difficulty breathing, excessive drooling, change in sputum.   Cardiac: Denies chest pain, flip-flop sensation   GI/Abdomen: Denies vomit, nausea, diarrhea, constipation, hematochezia.  Denies abdominal pain.    Uro/: Denies hematuria, urgency, frequency, burning sensation with urination. Denies discharge.   Skin: Denies lumps, bumps, rash.   MSK:  Denies weakness, numbness, tingling.    Neuro: Denies confusion, vertigo.              VITALS  There were no vitals filed for this visit.  110/54 96.5F hr 76 97%RA    Wt Readings from Last 3 Encounters:   04/26/24 66.7 kg (147 lb)   03/19/24 66.2 kg (146 lb)   03/18/24 65.8 kg (145 lb)       Ht Readings from Last 3 Encounters:   04/26/24 1.676 m (5' 6\")   03/19/24 1.676 m (5' 6\")   02/22/24 1.676 m (5' 6\")       BP Readings from Last 3 Encounters:   05/01/24 (!) 142/64   04/26/24 118/70   03/19/24 118/68       Physical Exam  PHYSICAL EXAM  General: Appears well, in no " "distress. Pt is pleasant. Hygiene normal.  Head: NC/AT.   Eyes: Conjunctiva and sclera normal bilaterally.   Neck: Inspection normal. Trachea midline. supple, FROM without pain. No cervical lymphadenopathy.  No enlargement of thyroid.  Cardiac: regular, rate, and rhythm. No murmurs, rubs, or gallops.  Lungs: negative for respiratory distress with normal respiratory effort. Lungs clear to auscultation bilaterally. No wheezes, rales, or rhonchi. No intercostal retractions  Abdomen: +BS. Bowel sounds normal. Abdomen is soft, non-distended. No tenderness. No masses or organomegaly. No guarding.   Skin: warm and dry. No erythema or rash.  Extremities: No peripheral edema or extremity lymphadenopathy  MSK: 5/5 UE and LE b/l. Gait stable and intact. Sitting Balance stable.   Psych: linear. Normal mood and affect.  No SI/HI. AAOX3.           PERTINENT LABS, IMAGING    No new labs.  Lab Results   Component Value Date    WBC 5.37 03/15/2024    HGB 10.4 (L) 03/15/2024    HCT 33.5 (L) 03/15/2024    MCV 91.5 03/15/2024     03/15/2024         Chemistry        Component Value Date/Time     03/15/2024 0829    K 4.3 03/15/2024 0829     (H) 03/15/2024 0829    CO2 22 03/15/2024 0829    BUN 18 03/15/2024 0829    CREATININE 0.8 03/15/2024 0829        Component Value Date/Time    CALCIUM 9.0 03/15/2024 0829    ALKPHOS 50 02/27/2024 0840    AST 18 02/27/2024 0840    ALT 15 02/27/2024 0840    BILITOT 0.5 02/27/2024 0840            Lab Results   Component Value Date    CHOL 154 02/02/2024    CHOL 173 12/01/2023    CHOL 170 07/14/2023     Lab Results   Component Value Date    HDL 57 02/02/2024    HDL 61 12/01/2023    HDL 52 07/14/2023     Lab Results   Component Value Date    LDLCALC 58 02/02/2024    LDLCALC 72 12/01/2023    LDLCALC 71 07/14/2023     Lab Results   Component Value Date    TRIG 196 (H) 02/02/2024    TRIG 201 (H) 12/01/2023    TRIG 237 (H) 07/14/2023     No results found for: \"CHOLHDL\"    Lab Results " "  Component Value Date    TSH 3.52 02/02/2024       Lab Results   Component Value Date    HGBA1C 5.4 02/02/2024       No results found for: \"HAV\", \"HEPAIGM\", \"HEPBIGM\", \"HEPBCAB\", \"HBEAG\", \"HEPCAB\"    No results found for: \"MICROALBUR\", \"HHTP85ERC\"    No results found for this or any previous visit (from the past 24 hour(s)).    No results found.              Immunization History   Administered Date(s) Administered    Pneumococcal Conjugate 13-Valent 09/11/2017    Pneumococcal Polysaccharide 04/02/2021    SARS-COV-2 (COVID19) VACCINE, PFIZER MONOVALENT 02/10/2021, 03/24/2021    Tdap 10/13/2008    Zoster 05/26/2009         Health Maintenance   Topic Date Due    RSV (60+ years old [shared decision making] or in pregnancy during 32 through 36 weeks) (1 - 1-dose 60+ series) Never done    Falls Risk Screening  Never done    Zoster Vaccine (2 of 3) 07/21/2009    DTaP, Tdap, and Td Vaccines (2 - Td or Tdap) 10/13/2018    COVID-19 Vaccine (3 - 2023-24 season) 09/01/2023    Medicare Annual Wellness Visit  07/28/2024    Influenza Vaccine (Season Ended) 08/01/2024    Depression Screening  02/17/2025    DEXA Scan  08/22/2025    Pneumococcal (65 years and older)  Completed    Meningococcal ACWY  Aged Out    RSV <20 months  Aged Out    HIB Vaccines  Aged Out    Hepatitis B Vaccines  Aged Out    IPV Vaccines  Aged Out    HPV Vaccines  Aged Out            Diagnosis Plan   1. Persistent atrial fibrillation (CMS/HCC)  Comprehensive metabolic panel    CBC and differential    Hemoglobin A1c    Lipid panel    Vitamin D 25 hydroxy    Vitamin B12    TSH w reflex FT4    Iron and TIBC    Ferritin      2. Essential hypertension  Comprehensive metabolic panel    CBC and differential    Hemoglobin A1c    Lipid panel    Vitamin D 25 hydroxy    Vitamin B12    TSH w reflex FT4    Iron and TIBC    Ferritin      3. Chronic anticoagulation  Comprehensive metabolic panel    CBC and differential    Hemoglobin A1c    Lipid panel    Vitamin D 25 hydroxy "    Vitamin B12    TSH w reflex FT4    Iron and TIBC    Ferritin      4. Other hyperlipidemia  Comprehensive metabolic panel    CBC and differential    Hemoglobin A1c    Lipid panel    Vitamin D 25 hydroxy    Vitamin B12    TSH w reflex FT4    Iron and TIBC    Ferritin      5. Anemia, unspecified type  Comprehensive metabolic panel    CBC and differential    Hemoglobin A1c    Lipid panel    Vitamin D 25 hydroxy    Vitamin B12    TSH w reflex FT4    Iron and TIBC    Ferritin      6. Vitamin D deficiency  Vitamin D 25 hydroxy      7. B12 deficiency  Vitamin B12      8. Abnormal finding of blood chemistry, unspecified  Hemoglobin A1c      9. Encounter for screening mammogram for malignant neoplasm of breast  BI SCREENING MAMMOGRAM BILATERAL(TOMOSYNTHESIS)      10. Paroxysmal atrial fibrillation (CMS/HCC)        11. Implantable loop recorder present              Shalini Kunz is a 88 y.o. female  With HTN, SVT and pyloric stricture seen for routine followup . She is a [facility] resident living with her .       Specialist   cardiology dr san  audiology-[place]  podiatry-[place]  [facility]he[facility] dr penn  [facility]- dr ken waller-> dr briceño   Derm dr zeng  ent dr jose alfredo barba      Hx of UGIB  Anemia  She was admitted to the hospital February 17, 2024 through February 20, 2024.  She presented with coffee-ground emesis and diarrhea and was found with hgb drop 10/5->8.7 cf a GI bleed.  Of note she is on Eliquis subtherapeutic dose for atrial fibrillation for prior GI bleed history.  She received 1 unit PRBC and underwent an EGD February 19 that did not show acute bleed did show erythema at her GJ anastomosis site and a colonoscopy on February 20 that also did not show an acute bleed.  Gastroenterology recommended lifelong proton pump inhibitor therapy and outpatient follow-up.  Her anticoagulant Eliquis was resumed on discharge with a hemoglobin of 8.9.  - repeat labs   - seen by GI dr adela shell ( MRI April 2024  reviewed)   - cont ppi omeprazole 40mg lifelong  - rto 4 motnhs      Memory changes   she feels she is in a fog and touble executing tasks   she came [facility]  Her son who usually is her support system is biking across [place]   adl and iadl-I  minicog normal  NS eval ordered per her request       moderate bile duct dilatation and mild pancreatic duct dilatation -GI ordered MRI      afib  HTN  - dr duque -> dr san  caridiology  - eliquis 2.5 po bid ( rec 5 bid dosing but pt reluctant with hx of GIB), metoprolol 25mg po daily    hx of PE    HLD- lipitor 5mg po daily,check level  January 2024 stress test negative    osteoporosis  vitamin D def- dec level 25 taking 1000 IU po daily  Rheum dr penn   reclast     b12 def-  reduce to QOD ,Repeat level    left wet mac deg- injections  right dry mac degen- eye    BCC-derm dr white, Right arm    migraine- prn sumatriptan 50mg working well    GOO sp lap gastro-jejunostomy dr Sy July 2019 , PRN omepzole 40mg po daily  pyloric stenosis- dilation in past  sp CCY      MAW 7/28/23   clock draw normal   word recall 3/3  ADLs and IADLs independent  Social alcohol use denies tobacco use  She is driving without issues    [facility] is good.  Exercising regularly takes medications as prescribed denies falls    To do  Comprehensive labs this week  GI July 2024    tdap  shinrix    I attest that this visit supports the complexity inherent to evaluation and management associated with medical care services that serve as the continuing focal point for all needed health care services and/or medical care services that are part of ongoing care related to this patient's single, serious condition or a complex condition.    RTO 6 motnhs    Return in about 6 months (around 12/21/2024), or if symptoms worsen or fail to improve, for Followup with Dr. Renée Pittman.    Renée Pittman, DO

## 2024-06-23 DIAGNOSIS — I48.0 PAROXYSMAL ATRIAL FIBRILLATION (CMS/HCC): Primary | ICD-10-CM

## 2024-06-23 DIAGNOSIS — I10 ESSENTIAL HYPERTENSION: ICD-10-CM

## 2024-06-23 RX ORDER — METOPROLOL SUCCINATE 25 MG/1
25 TABLET, EXTENDED RELEASE ORAL DAILY
Qty: 90 TABLET | Refills: 3 | Status: SHIPPED | OUTPATIENT
Start: 2024-06-23 | End: 2024-09-24

## 2024-06-28 ENCOUNTER — LAB REQUISITION (OUTPATIENT)
Dept: LAB | Facility: HOSPITAL | Age: 89
End: 2024-06-28
Attending: FAMILY MEDICINE
Payer: MEDICARE

## 2024-06-28 ENCOUNTER — TELEPHONE (OUTPATIENT)
Dept: PRIMARY CARE | Facility: CLINIC | Age: 89
End: 2024-06-28
Payer: MEDICARE

## 2024-06-28 DIAGNOSIS — I10 ESSENTIAL (PRIMARY) HYPERTENSION: ICD-10-CM

## 2024-06-28 DIAGNOSIS — D50.9 IRON DEFICIENCY ANEMIA, UNSPECIFIED IRON DEFICIENCY ANEMIA TYPE: Primary | ICD-10-CM

## 2024-06-28 DIAGNOSIS — E78.49 OTHER HYPERLIPIDEMIA: ICD-10-CM

## 2024-06-28 DIAGNOSIS — D64.9 ANEMIA, UNSPECIFIED: ICD-10-CM

## 2024-06-28 DIAGNOSIS — Z79.01 LONG TERM (CURRENT) USE OF ANTICOAGULANTS: ICD-10-CM

## 2024-06-28 DIAGNOSIS — I48.19 OTHER PERSISTENT ATRIAL FIBRILLATION: ICD-10-CM

## 2024-06-28 DIAGNOSIS — R79.9 ABNORMAL FINDING OF BLOOD CHEMISTRY, UNSPECIFIED: ICD-10-CM

## 2024-06-28 LAB
25(OH)D3 SERPL-MCNC: 47 NG/ML (ref 30–100)
ALBUMIN SERPL-MCNC: 4.1 G/DL (ref 3.5–5.7)
ALP SERPL-CCNC: 56 IU/L (ref 34–125)
ALT SERPL-CCNC: 12 IU/L (ref 7–52)
ANION GAP SERPL CALC-SCNC: 5 MEQ/L (ref 3–15)
AST SERPL-CCNC: 17 IU/L (ref 13–39)
BASOPHILS # BLD: 0.03 K/UL (ref 0.01–0.1)
BASOPHILS NFR BLD: 0.8 %
BILIRUB SERPL-MCNC: 0.5 MG/DL (ref 0.3–1.2)
BUN SERPL-MCNC: 19 MG/DL (ref 7–25)
CALCIUM SERPL-MCNC: 9.4 MG/DL (ref 8.6–10.3)
CHLORIDE SERPL-SCNC: 108 MEQ/L (ref 98–107)
CHOLEST SERPL-MCNC: 172 MG/DL
CO2 SERPL-SCNC: 26 MEQ/L (ref 21–31)
CREAT SERPL-MCNC: 0.7 MG/DL (ref 0.6–1.2)
DIFFERENTIAL METHOD BLD: ABNORMAL
EGFRCR SERPLBLD CKD-EPI 2021: >60 ML/MIN/1.73M*2
EOSINOPHIL # BLD: 0.03 K/UL (ref 0.04–0.36)
EOSINOPHIL NFR BLD: 0.8 %
ERYTHROCYTE [DISTWIDTH] IN BLOOD BY AUTOMATED COUNT: 18.1 % (ref 11.7–14.4)
EST. AVERAGE GLUCOSE BLD GHB EST-MCNC: 114 MG/DL
FERRITIN SERPL-MCNC: 4 NG/ML (ref 11–250)
GLUCOSE SERPL-MCNC: 90 MG/DL (ref 70–99)
HBA1C MFR BLD: 5.6 %
HCT VFR BLD AUTO: 36.7 % (ref 35–45)
HDLC SERPL-MCNC: 60 MG/DL
HDLC SERPL: 2.9 {RATIO}
HGB BLD-MCNC: 10.6 G/DL (ref 11.8–15.7)
IMM GRANULOCYTES # BLD AUTO: 0.01 K/UL (ref 0–0.08)
IMM GRANULOCYTES NFR BLD AUTO: 0.3 %
IRON SATN MFR SERPL: 8 % (ref 15–45)
IRON SERPL-MCNC: 39 UG/DL (ref 35–150)
LDLC SERPL CALC-MCNC: 63 MG/DL
LYMPHOCYTES # BLD: 1.34 K/UL (ref 1.2–3.5)
LYMPHOCYTES NFR BLD: 35.4 %
MCH RBC QN AUTO: 22.7 PG (ref 28–33.2)
MCHC RBC AUTO-ENTMCNC: 28.9 G/DL (ref 32.2–35.5)
MCV RBC AUTO: 78.6 FL (ref 83–98)
MONOCYTES # BLD: 0.39 K/UL (ref 0.28–0.8)
MONOCYTES NFR BLD: 10.3 %
NEUTROPHILS # BLD: 1.98 K/UL (ref 1.7–7)
NEUTS SEG NFR BLD: 52.4 %
NONHDLC SERPL-MCNC: 112 MG/DL
NRBC BLD-RTO: 0 %
PDW BLD AUTO: 11.2 FL (ref 9.4–12.3)
PLATELET # BLD AUTO: 233 K/UL (ref 150–369)
POTASSIUM SERPL-SCNC: 4.7 MEQ/L (ref 3.5–5.1)
PROT SERPL-MCNC: 5.9 G/DL (ref 6–8.2)
RBC # BLD AUTO: 4.67 M/UL (ref 3.93–5.22)
SODIUM SERPL-SCNC: 139 MEQ/L (ref 136–145)
TIBC SERPL-MCNC: 491 UG/DL (ref 270–460)
TRIGL SERPL-MCNC: 247 MG/DL
TSH SERPL DL<=0.05 MIU/L-ACNC: 3.5 MIU/L (ref 0.34–5.6)
UIBC SERPL-MCNC: 452 UG/DL (ref 180–360)
VIT B12 SERPL-MCNC: 423 PG/ML (ref 180–914)
WBC # BLD AUTO: 3.78 K/UL (ref 3.8–10.5)

## 2024-06-28 PROCEDURE — 83550 IRON BINDING TEST: CPT | Performed by: FAMILY MEDICINE

## 2024-06-28 PROCEDURE — 80061 LIPID PANEL: CPT | Performed by: FAMILY MEDICINE

## 2024-06-28 PROCEDURE — 82607 VITAMIN B-12: CPT | Mod: GZ | Performed by: FAMILY MEDICINE

## 2024-06-28 PROCEDURE — 85025 COMPLETE CBC W/AUTO DIFF WBC: CPT | Performed by: FAMILY MEDICINE

## 2024-06-28 PROCEDURE — 36415 COLL VENOUS BLD VENIPUNCTURE: CPT | Performed by: FAMILY MEDICINE

## 2024-06-28 PROCEDURE — 83540 ASSAY OF IRON: CPT | Performed by: FAMILY MEDICINE

## 2024-06-28 PROCEDURE — 82306 VITAMIN D 25 HYDROXY: CPT | Mod: GZ | Performed by: FAMILY MEDICINE

## 2024-06-28 PROCEDURE — 82728 ASSAY OF FERRITIN: CPT | Performed by: FAMILY MEDICINE

## 2024-06-28 PROCEDURE — 84443 ASSAY THYROID STIM HORMONE: CPT | Performed by: FAMILY MEDICINE

## 2024-06-28 PROCEDURE — 80053 COMPREHEN METABOLIC PANEL: CPT | Performed by: FAMILY MEDICINE

## 2024-06-28 PROCEDURE — 83036 HEMOGLOBIN GLYCOSYLATED A1C: CPT | Performed by: FAMILY MEDICINE

## 2024-06-28 RX ORDER — OMEPRAZOLE 40 MG/1
40 CAPSULE, DELAYED RELEASE ORAL
Qty: 180 CAPSULE | Refills: 3 | Status: SHIPPED | OUTPATIENT
Start: 2024-06-28 | End: 2025-06-28

## 2024-06-28 RX ORDER — FERROUS SULFATE 325(65) MG
325 TABLET, DELAYED RELEASE (ENTERIC COATED) ORAL
Qty: 90 TABLET | Refills: 3 | Status: SHIPPED | OUTPATIENT
Start: 2024-06-28 | End: 2025-03-25 | Stop reason: ALTCHOICE

## 2024-06-28 RX ORDER — ASCORBIC ACID 500 MG
500 TABLET ORAL DAILY
Qty: 90 TABLET | Refills: 3 | Status: SHIPPED | OUTPATIENT
Start: 2024-06-28 | End: 2025-03-25 | Stop reason: ALTCHOICE

## 2024-06-28 NOTE — RESULT ENCOUNTER NOTE
Hx of recent UGIB, anemia. Hgb stable but iron def and ferritin at 4.   Start oral iron daily and up to bid as tolerated with iron. PPI daily-> bid. Must see heme and gi folowup  Reviewed with patient and given to do list

## 2024-07-26 LAB
ANISOCYTOSIS BLD QL SMEAR: ABNORMAL
BASOPHILS # BLD: 0.02 K/UL (ref 0.01–0.1)
BASOPHILS NFR BLD: 0.4 %
DIFFERENTIAL METHOD BLD: ABNORMAL
EOSINOPHIL # BLD: 0.04 K/UL (ref 0.04–0.36)
EOSINOPHIL NFR BLD: 0.8 %
ERYTHROCYTE [DISTWIDTH] IN BLOOD BY AUTOMATED COUNT: 22.1 % (ref 11.7–14.4)
FERRITIN SERPL-MCNC: 16 NG/ML (ref 11–250)
HCT VFR BLD AUTO: 41.8 % (ref 35–45)
HGB BLD-MCNC: 12.2 G/DL (ref 11.8–15.7)
IMM GRANULOCYTES # BLD AUTO: 0.01 K/UL (ref 0–0.08)
IMM GRANULOCYTES NFR BLD AUTO: 0.2 %
IRON SATN MFR SERPL: 15 % (ref 15–45)
IRON SERPL-MCNC: 62 UG/DL (ref 35–150)
LYMPHOCYTES # BLD: 1.47 K/UL (ref 1.2–3.5)
LYMPHOCYTES NFR BLD: 28.5 %
MCH RBC QN AUTO: 24.3 PG (ref 28–33.2)
MCHC RBC AUTO-ENTMCNC: 29.2 G/DL (ref 32.2–35.5)
MCV RBC AUTO: 83.3 FL (ref 83–98)
MICROCYTES BLD QL SMEAR: ABNORMAL
MONOCYTES # BLD: 0.69 K/UL (ref 0.28–0.8)
MONOCYTES NFR BLD: 13.4 %
NEUTROPHILS # BLD: 2.92 K/UL (ref 1.7–7)
NEUTS SEG NFR BLD: 56.7 %
NRBC BLD-RTO: 0 %
OVALOCYTES BLD QL SMEAR: ABNORMAL
PDW BLD AUTO: 10.7 FL (ref 9.4–12.3)
PLAT MORPH BLD: NORMAL
PLATELET # BLD AUTO: 239 K/UL (ref 150–369)
PLATELET # BLD EST: ABNORMAL 10*3/UL
POLYCHROMASIA BLD QL SMEAR: ABNORMAL
RBC # BLD AUTO: 5.02 M/UL (ref 3.93–5.22)
TIBC SERPL-MCNC: 407 UG/DL (ref 270–460)
UIBC SERPL-MCNC: 345 UG/DL (ref 180–360)
WBC # BLD AUTO: 5.15 K/UL (ref 3.8–10.5)

## 2024-07-26 PROCEDURE — 36415 COLL VENOUS BLD VENIPUNCTURE: CPT | Performed by: INTERNAL MEDICINE

## 2024-07-26 PROCEDURE — 82728 ASSAY OF FERRITIN: CPT | Performed by: FAMILY MEDICINE

## 2024-07-26 PROCEDURE — 83550 IRON BINDING TEST: CPT | Performed by: FAMILY MEDICINE

## 2024-07-26 PROCEDURE — 85025 COMPLETE CBC W/AUTO DIFF WBC: CPT | Performed by: FAMILY MEDICINE

## 2024-07-29 ENCOUNTER — TELEPHONE (OUTPATIENT)
Dept: PRIMARY CARE | Facility: CLINIC | Age: 89
End: 2024-07-29
Payer: MEDICARE

## 2024-07-29 NOTE — TELEPHONE ENCOUNTER
----- Message from Renée Pittman DO sent at 7/29/2024  8:14 AM EDT -----  [facility]. Please let her knoe her anemia is improved compared to 1 and 4 months ago. Hemoglobinb is 12.2 . She should keep taking iron and make sure to see the hematologist as I reviewed last phone encounter.   Dr. Pittman

## 2024-08-05 ENCOUNTER — OFFICE VISIT (OUTPATIENT)
Dept: CARDIOLOGY | Facility: CLINIC | Age: 89
End: 2024-08-05
Payer: MEDICARE

## 2024-08-05 VITALS
BODY MASS INDEX: 23.14 KG/M2 | SYSTOLIC BLOOD PRESSURE: 138 MMHG | WEIGHT: 144 LBS | DIASTOLIC BLOOD PRESSURE: 74 MMHG | HEIGHT: 66 IN | HEART RATE: 69 BPM

## 2024-08-05 DIAGNOSIS — Z95.818 IMPLANTABLE LOOP RECORDER PRESENT: ICD-10-CM

## 2024-08-05 DIAGNOSIS — I10 PRIMARY HYPERTENSION: ICD-10-CM

## 2024-08-05 DIAGNOSIS — I48.0 PAROXYSMAL ATRIAL FIBRILLATION (CMS/HCC): Primary | ICD-10-CM

## 2024-08-05 LAB
ATRIAL RATE: 69
P AXIS: 29
PR INTERVAL: 188
QRS DURATION: 84
QT INTERVAL: 388
QTC CALCULATION(BAZETT): 415
R AXIS: 50
T WAVE AXIS: 51
VENTRICULAR RATE: 69

## 2024-08-05 PROCEDURE — 99214 OFFICE O/P EST MOD 30 MIN: CPT | Performed by: INTERNAL MEDICINE

## 2024-08-05 PROCEDURE — 93000 ELECTROCARDIOGRAM COMPLETE: CPT | Performed by: INTERNAL MEDICINE

## 2024-08-05 ASSESSMENT — ENCOUNTER SYMPTOMS
BRUISES/BLEEDS EASILY: 0
PALPITATIONS: 0
WHEEZING: 0
SHORTNESS OF BREATH: 0
ARTHRALGIAS: 0
FATIGUE: 0
NERVOUS/ANXIOUS: 0
ABDOMINAL PAIN: 0
APPETITE CHANGE: 0
MYALGIAS: 0
DYSURIA: 0
DIZZINESS: 0

## 2024-08-05 NOTE — LETTER
August 5, 2024     Renée Pittman DO  [address] [facility] [address]  [facility] [address]    Patient: Shalini Kunz  YOB: 1935  Date of Visit: 8/5/2024      Dear Dr. Pittman:    Thank you for referring Shalini Kunz to me for evaluation. Below are my notes for this consultation.    If you have questions, please do not hesitate to call me. I look forward to following your patient along with you.         Sincerely,        Beni Carias MD        CC: MD Jv Clement, Beni HOLT MD  8/5/2024  2:11 PM  Sign when Signing Visit       Electrophysiology       Reason for visit:   Chief Complaint   Patient presents with   • Device Check      HPI  Shalini Kunz is a 88 y.o. female who comes to the office today for follow-up of her implanted loop recorder and history of atrial fibrillation.  She had a single episode of atrial fibrillation while hospitalized with an acute illness.  She has a SGH2FL5-FIGn score of 4, so we felt it was important to rule out the possibility of other episodes of asymptomatic atrial fibrillation.  For that reason, she had a loop recorder implanted 5/1/2024.  Since implantation of the device she has done well.  She describes 1 episode where she felt pain at the device site that was fleeting, but no other issues with the loop recorder.  She does not have palpitations, angina or shortness of breath.      Past Medical History:   Diagnosis Date   • Anemia    • Arthritis    • Atrial fibrillation (CMS/HCC)     Only documented episode was during acute illness   • Deep vein thrombosis (CMS/HCC)     2019   • GI (gastrointestinal bleed)    • Hypertension    • Lipid disorder      Past Surgical History:   Procedure Laterality Date   • ABDOMINAL SURGERY      2019   • ADENOIDECTOMY Bilateral    • CHOLECYSTECTOMY  10/03/2011   • EYE SURGERY      2020    • GANGLION CYST EXCISION     • GASTRIC BYPASS     • TONSILLECTOMY       Allergies:  Patient has no known allergies.    Current  Outpatient Medications   Medication Sig Dispense Refill   • ascorbic acid (ascorbic acid with harlan hips) 500 mg tablet Take 1 tablet (500 mg total) by mouth daily. 90 tablet 3   • atorvastatin (LIPITOR) 10 mg tablet TAKE 1/2 TABLET  (5 MG TOTAL) BY MOUTH DAILY. 45 tablet 3   • calcium carbonate-vitamin D3 (CALCIUM 600 WITH VITAMIN D3) 600 mg(1,500mg) -400 unit tablet,chewable Take 600 mg by mouth once.     • cholecalciferol, vitamin D3, 1,000 unit (25 mcg) tablet Take 1 tablet (1,000 Units total) by mouth daily. 90 tablet 3   • cyanocobalamin 1,000 mcg tablet Take 1 tablet (1,000 mcg total) by mouth daily. 90 tablet 3   • ferrous sulfate 325 mg (65 mg iron) EC tablet Take 1 tablet (325 mg total) by mouth daily with breakfast. 90 tablet 3   • metoprolol succinate XL (TOPROL-XL) 25 mg 24 hr tablet Take 1 tablet (25 mg total) by mouth daily. 90 tablet 3   • multivitamin capsule Take 1 capsule by mouth daily.     • omeprazole (PriLOSEC) 40 mg capsule Take 1 capsule (40 mg total) by mouth 2 (two) times a day before breakfast and dinner. 180 capsule 3   • SUMAtriptan (IMITREX) 50 mg tablet Take 1 tablet (50 mg total) by mouth once as needed for migraine. 30 tablet 3     No current facility-administered medications for this visit.     Social History     Socioeconomic History   • Marital status:      Spouse name: None   • Number of children: None   • Years of education: None   • Highest education level: None   Tobacco Use   • Smoking status: Never   • Smokeless tobacco: Never   Vaping Use   • Vaping Use: Never used   Substance and Sexual Activity   • Alcohol use: Yes     Alcohol/week: 1.0 standard drink of alcohol     Types: 1 Glasses of wine per week     Comment: half one at dinner    • Drug use: Never   • Sexual activity: Defer     Social Determinants of Health     Food Insecurity: No Food Insecurity (2/17/2024)    Hunger Vital Sign    • Worried About Running Out of Food in the Last Year: Never true    • Ran Out of  Food in the Last Year: Never true   Transportation Needs: No Transportation Needs (2/19/2024)    PRAPARE - Transportation    • Lack of Transportation (Medical): No    • Lack of Transportation (Non-Medical): No   Housing Stability: Low Risk  (2/19/2024)    Housing Stability Vital Sign    • Unable to Pay for Housing in the Last Year: No    • Number of Places Lived in the Last Year: 1    • Unstable Housing in the Last Year: No     Family History   Problem Relation Age of Onset   • Alzheimer's disease Biological Mother    • Heart failure Biological Father    • No Known Problems Biological Sister      Review of Systems   Constitutional:  Negative for appetite change and fatigue.   HENT:  Negative for hearing loss and nosebleeds.    Eyes:  Negative for visual disturbance.   Respiratory:  Negative for shortness of breath and wheezing.    Cardiovascular:  Negative for chest pain and palpitations.   Gastrointestinal:  Negative for abdominal pain.   Genitourinary:  Negative for dysuria.   Musculoskeletal:  Negative for arthralgias and myalgias.   Skin:  Negative for rash.   Neurological:  Negative for dizziness.   Hematological:  Does not bruise/bleed easily.   Psychiatric/Behavioral:  The patient is not nervous/anxious.      Vitals:    08/05/24 1347   BP: 138/74   Pulse: 69     Wt Readings from Last 3 Encounters:   08/05/24 65.3 kg (144 lb)   04/26/24 66.7 kg (147 lb)   03/19/24 66.2 kg (146 lb)     Physical Exam  Constitutional:       General: She is not in acute distress.     Appearance: She is well-developed.   HENT:      Head: Atraumatic.   Eyes:      General: No scleral icterus.  Neck:      Thyroid: No thyromegaly.   Cardiovascular:      Rate and Rhythm: Regular rhythm.      Heart sounds: No murmur heard.  Pulmonary:      Effort: No respiratory distress.      Breath sounds: Normal breath sounds.   Abdominal:      Palpations: Abdomen is soft.      Tenderness: There is no abdominal tenderness.   Musculoskeletal:          General: No deformity.   Skin:     Findings: No rash.   Neurological:      Mental Status: She is alert.      Motor: No abnormal muscle tone.          Labs and test results personally reviewed by me:    Lab Results   Component Value Date    WBC 5.15 07/26/2024    HGB 12.2 07/26/2024    HCT 41.8 07/26/2024     07/26/2024    CHOL 172 06/28/2024    TRIG 247 (H) 06/28/2024    HDL 60 06/28/2024    LDLCALC 63 06/28/2024    ALT 12 06/28/2024    AST 17 06/28/2024     06/28/2024    K 4.7 06/28/2024    CREATININE 0.7 06/28/2024    BUN 19 06/28/2024    TSH 3.50 06/28/2024    INR 1.2 02/17/2024       Cardiac Imaging    TRANSTHORACIC ECHO (TTE) COMPLETE 03/07/2023    Interpretation Summary  •  Left Ventricle: Normal ventricle size. Normal wall thickness. Estimated EF 55%. No regional wall motion abnormalities. Grade I diastolic dysfunction.  •  Right Ventricle: Normal ventricle size. Low normal systolic function.  •  Left Atrium: Mildly dilated atrium.  •  Right Atrium: Normal sized atrium.  •  Aortic Valve: Tricuspid valve. Moderate regurgitation. No stenosis.  Aorta top normal 3.8 cm  •  Mitral Valve: Bileaflet thickening. Mild regurgitation. The jet is eccentric.  •  Tricuspid Valve: Normal structure. Mild regurgitation. Estimated RVSP = 27 mmHg.  •  Pulmonic Valve: Normal structure. Trace regurgitation.  •  IVC/SVC: Inferior vena cava is <2.1cm. Inferior vena cava collapses >50% during inspiration.  •  Pericardium: Normal structure.    Most recent stress test results:    STRESS TEST, REGADENOSON W MYOCARDIAL PERFUSION SPECT (MULTI STUDY) 01/29/2024 (Final) 1/29/2024    Interpretation Summary  •  Negative lexiscan ECG test. Nuclear images pending and are to be reported by radiology  •  Response to Stress: There were no arrhythmias during stress. There were no arrhythmias during recovery.        ECG personally read and interpreted by me today: Sinus rhythm at 69 bpm, normal ECG.    Cardiac rhythm device  interrogation reviewed and interpreted by me today:   I reviewed remote data from her subcutaneous loop recorder with remote downloads as recently as yesterday.  There have not been any episodes of atrial fibrillation since implantation of the device.       Paroxysmal atrial fibrillation (CMS/HCC)  She had a single episode of atrial fibrillation that occurred while hospitalized with an acute illness.  She has not had any clinical recurrences of atrial fibrillation since then and there have been no documented episodes of atrial fibrillation on her implanted loop recorder since 5/1/2024.  She recently stopped her Eliquis because of anemia, but without a diagnosis of bleeding.  I am comfortable with her staying off anticoagulation at this point.    If her loop recorder picks up episodes of atrial fibrillation we can start her on short-term anticoagulation, but she would want a left atrial appendage closure device for longer term prevention.  If she continues to be free of atrial fibrillation by her next office visit we can offer her loop recorder removal.    Implantable loop recorder present  Normally functioning subcutaneous loop recorder.  We will continue monitoring her loop recorder using the remote monitoring system and she should return to the office for follow-up in about 6 months.  If she remains free of atrial fibrillation at that time we can offer her device removal.    Primary hypertension  Her blood pressure is mildly elevated today.  I chose not to make any changes in her medications.        This letter was generated using speech recognition software. Please excuse any typographical errors.       Beni Carias MD  8/5/2024

## 2024-08-05 NOTE — ASSESSMENT & PLAN NOTE
Normally functioning subcutaneous loop recorder.  We will continue monitoring her loop recorder using the remote monitoring system and she should return to the office for follow-up in about 6 months.  If she remains free of atrial fibrillation at that time we can offer her device removal.

## 2024-08-05 NOTE — ASSESSMENT & PLAN NOTE
She had a single episode of atrial fibrillation that occurred while hospitalized with an acute illness.  She has not had any clinical recurrences of atrial fibrillation since then and there have been no documented episodes of atrial fibrillation on her implanted loop recorder since 5/1/2024.  She recently stopped her Eliquis because of anemia, but without a diagnosis of bleeding.  I am comfortable with her staying off anticoagulation at this point.    If her loop recorder picks up episodes of atrial fibrillation we can start her on short-term anticoagulation, but she would want a left atrial appendage closure device for longer term prevention.  If she continues to be free of atrial fibrillation by her next office visit we can offer her loop recorder removal.

## 2024-08-05 NOTE — PROGRESS NOTES
Electrophysiology       Reason for visit:   Chief Complaint   Patient presents with    Device Check      HPI  Shalini Kunz is a 88 y.o. female who comes to the office today for follow-up of her implanted loop recorder and history of atrial fibrillation.  She had a single episode of atrial fibrillation while hospitalized with an acute illness.  She has a [id number]-AMMf score of 4, so we felt it was important to rule out the possibility of other episodes of asymptomatic atrial fibrillation.  For that reason, she had a loop recorder implanted 5/1/2024.  Since implantation of the device she has done well.  She describes 1 episode where she felt pain at the device site that was fleeting, but no other issues with the loop recorder.  She does not have palpitations, angina or shortness of breath.      Past Medical History:   Diagnosis Date    Anemia     Arthritis     Atrial fibrillation (CMS/HCC)     Only documented episode was during acute illness    Deep vein thrombosis (CMS/HCC)     2019    GI (gastrointestinal bleed)     Hypertension     Lipid disorder      Past Surgical History:   Procedure Laterality Date    ABDOMINAL SURGERY      2019    ADENOIDECTOMY Bilateral     CHOLECYSTECTOMY  10/03/2011    EYE SURGERY      2020     GANGLION CYST EXCISION      GASTRIC BYPASS      TONSILLECTOMY       Allergies:  Patient has no known allergies.    Current Outpatient Medications   Medication Sig Dispense Refill    ascorbic acid (ascorbic acid with harlan hips) 500 mg tablet Take 1 tablet (500 mg total) by mouth daily. 90 tablet 3    atorvastatin (LIPITOR) 10 mg tablet TAKE 1/2 TABLET  (5 MG TOTAL) BY MOUTH DAILY. 45 tablet 3    calcium carbonate-vitamin D3 (CALCIUM 600 WITH VITAMIN D3) 600 mg(1,500mg) -400 unit tablet,chewable Take 600 mg by mouth once.      cholecalciferol, vitamin D3, 1,000 unit (25 mcg) tablet Take 1 tablet (1,000 Units total) by mouth daily. 90 tablet 3    cyanocobalamin 1,000 mcg tablet Take 1 tablet (1,000 mcg  total) by mouth daily. 90 tablet 3    ferrous sulfate 325 mg (65 mg iron) EC tablet Take 1 tablet (325 mg total) by mouth daily with breakfast. 90 tablet 3    metoprolol succinate XL (TOPROL-XL) 25 mg 24 hr tablet Take 1 tablet (25 mg total) by mouth daily. 90 tablet 3    multivitamin capsule Take 1 capsule by mouth daily.      omeprazole (PriLOSEC) 40 mg capsule Take 1 capsule (40 mg total) by mouth 2 (two) times a day before breakfast and dinner. 180 capsule 3    SUMAtriptan (IMITREX) 50 mg tablet Take 1 tablet (50 mg total) by mouth once as needed for migraine. 30 tablet 3     No current facility-administered medications for this visit.     Social History     Socioeconomic History    Marital status:      Spouse name: None    Number of children: None    Years of education: None    Highest education level: None   Tobacco Use    Smoking status: Never    Smokeless tobacco: Never   Vaping Use    Vaping Use: Never used   Substance and Sexual Activity    Alcohol use: Yes     Alcohol/week: 1.0 standard drink of alcohol     Types: 1 Glasses of wine per week     Comment: half one at dinner     Drug use: Never    Sexual activity: Defer     Social Determinants of Health     Food Insecurity: No Food Insecurity (2/17/2024)    Hunger Vital Sign     Worried About Running Out of Food in the Last Year: Never true     Ran Out of Food in the Last Year: Never true   Transportation Needs: No Transportation Needs (2/19/2024)    PRAPARE - Transportation     Lack of Transportation (Medical): No     Lack of Transportation (Non-Medical): No   Housing Stability: Low Risk  (2/19/2024)    Housing Stability Vital Sign     Unable to Pay for Housing in the Last Year: No     Number of Places Lived in the Last Year: 1     Unstable Housing in the Last Year: No     Family History   Problem Relation Age of Onset    Alzheimer's disease Biological Mother     Heart failure Biological Father     No Known Problems Biological Sister      Review of  Systems   Constitutional:  Negative for appetite change and fatigue.   HENT:  Negative for hearing loss and nosebleeds.    Eyes:  Negative for visual disturbance.   Respiratory:  Negative for shortness of breath and wheezing.    Cardiovascular:  Negative for chest pain and palpitations.   Gastrointestinal:  Negative for abdominal pain.   Genitourinary:  Negative for dysuria.   Musculoskeletal:  Negative for arthralgias and myalgias.   Skin:  Negative for rash.   Neurological:  Negative for dizziness.   Hematological:  Does not bruise/bleed easily.   Psychiatric/Behavioral:  The patient is not nervous/anxious.      Vitals:    08/05/24 1347   BP: 138/74   Pulse: 69     Wt Readings from Last 3 Encounters:   08/05/24 65.3 kg (144 lb)   04/26/24 66.7 kg (147 lb)   03/19/24 66.2 kg (146 lb)     Physical Exam  Constitutional:       General: She is not in acute distress.     Appearance: She is well-developed.   HENT:      Head: Atraumatic.   Eyes:      General: No scleral icterus.  Neck:      Thyroid: No thyromegaly.   Cardiovascular:      Rate and Rhythm: Regular rhythm.      Heart sounds: No murmur heard.  Pulmonary:      Effort: No respiratory distress.      Breath sounds: Normal breath sounds.   Abdominal:      Palpations: Abdomen is soft.      Tenderness: There is no abdominal tenderness.   Musculoskeletal:         General: No deformity.   Skin:     Findings: No rash.   Neurological:      Mental Status: She is alert.      Motor: No abnormal muscle tone.          Labs and test results personally reviewed by me:    Lab Results   Component Value Date    WBC 5.15 07/26/2024    HGB 12.2 07/26/2024    HCT 41.8 07/26/2024     07/26/2024    CHOL 172 06/28/2024    TRIG 247 (H) 06/28/2024    HDL 60 06/28/2024    LDLCALC 63 06/28/2024    ALT 12 06/28/2024    AST 17 06/28/2024     06/28/2024    K 4.7 06/28/2024    CREATININE 0.7 06/28/2024    BUN 19 06/28/2024    TSH 3.50 06/28/2024    INR 1.2 02/17/2024       Cardiac  Imaging    TRANSTHORACIC ECHO (TTE) COMPLETE 03/07/2023    Interpretation Summary    Left Ventricle: Normal ventricle size. Normal wall thickness. Estimated EF 55%. No regional wall motion abnormalities. Grade I diastolic dysfunction.    Right Ventricle: Normal ventricle size. Low normal systolic function.    Left Atrium: Mildly dilated atrium.    Right Atrium: Normal sized atrium.    Aortic Valve: Tricuspid valve. Moderate regurgitation. No stenosis.  Aorta top normal 3.8 cm    Mitral Valve: Bileaflet thickening. Mild regurgitation. The jet is eccentric.    Tricuspid Valve: Normal structure. Mild regurgitation. Estimated RVSP = 27 mmHg.    Pulmonic Valve: Normal structure. Trace regurgitation.    IVC/SVC: Inferior vena cava is <2.1cm. Inferior vena cava collapses >50% during inspiration.    Pericardium: Normal structure.    Most recent stress test results:    STRESS TEST, REGADENOSON W MYOCARDIAL PERFUSION SPECT (MULTI STUDY) 01/29/2024 (Final) 1/29/2024    Interpretation Summary    Negative lexiscan ECG test. Nuclear images pending and are to be reported by radiology    Response to Stress: There were no arrhythmias during stress. There were no arrhythmias during recovery.        ECG personally read and interpreted by me today: Sinus rhythm at 69 bpm, normal ECG.    Cardiac rhythm device interrogation reviewed and interpreted by me today:   I reviewed remote data from her subcutaneous loop recorder with remote downloads as recently as yesterday.  There have not been any episodes of atrial fibrillation since implantation of the device.       Paroxysmal atrial fibrillation (CMS/HCC)  She had a single episode of atrial fibrillation that occurred while hospitalized with an acute illness.  She has not had any clinical recurrences of atrial fibrillation since then and there have been no documented episodes of atrial fibrillation on her implanted loop recorder since 5/1/2024.  She recently stopped her Eliquis because of  anemia, but without a diagnosis of bleeding.  I am comfortable with her staying off anticoagulation at this point.    If her loop recorder picks up episodes of atrial fibrillation we can start her on short-term anticoagulation, but she would want a left atrial appendage closure device for longer term prevention.  If she continues to be free of atrial fibrillation by her next office visit we can offer her loop recorder removal.    Implantable loop recorder present  Normally functioning subcutaneous loop recorder.  We will continue monitoring her loop recorder using the remote monitoring system and she should return to the office for follow-up in about 6 months.  If she remains free of atrial fibrillation at that time we can offer her device removal.    Primary hypertension  Her blood pressure is mildly elevated today.  I chose not to make any changes in her medications.        This letter was generated using speech recognition software. Please excuse any typographical errors.       Beni Carias MD  8/5/2024

## 2024-08-08 ENCOUNTER — OFFICE VISIT (OUTPATIENT)
Dept: GASTROENTEROLOGY | Facility: HOSPITAL | Age: 89
End: 2024-08-08
Payer: MEDICARE

## 2024-08-08 VITALS
RESPIRATION RATE: 16 BRPM | HEIGHT: 66 IN | BODY MASS INDEX: 23.14 KG/M2 | OXYGEN SATURATION: 97 % | DIASTOLIC BLOOD PRESSURE: 63 MMHG | HEART RATE: 77 BPM | WEIGHT: 144 LBS | SYSTOLIC BLOOD PRESSURE: 137 MMHG

## 2024-08-08 DIAGNOSIS — K83.8 ACQUIRED DILATION OF COMMON BILE DUCT: Primary | ICD-10-CM

## 2024-08-08 DIAGNOSIS — K92.2 GASTROINTESTINAL HEMORRHAGE, UNSPECIFIED GASTROINTESTINAL HEMORRHAGE TYPE: ICD-10-CM

## 2024-08-08 NOTE — PROGRESS NOTES
Gastroenterology Clinic Note       SUBJECTIVE   Ms Kunz is a 88 y.o female with pmh of HTN, HLD, pAFib (on Eliquis), s/p CCY, hx of pyloric stenosis (s/p serial TTS balloon dilations several years ago 2019 with eventual surgery - gastrojejunostomy?), and recent hospitalization at [facility] (2/17 - 2/20/24) with an acute UGIB (unclear etiology) who presents to the GI clinic for follow-up.    Most recently seen in [facility] GI clinic by Dr. Rhoades on 2/22/24 for hospital follow-up.   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     MRI/MRCP for duct dilation on 4/5/24:   -Intrahepatic and extrahepatic bile ducts: Normal caliber with the common bile  duct measuring up to 0.6 cm and the common hepatic duct measuring up to 0.7 cm. No choledocholithiasis, biliary stricture or extrinsic compressing mass.  -Pancreas is normal in signal intensity and enhancement.  Pancreatic  duct caliber is normal. Normal pancreatic duct drainage pattern without evidence  of divisum. No restricted diffusion. 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 Pancreatic duct measures 0.2 cm within the body  and tail are obscured 0.3 cm just upstream from the ampulla Vater, within normal  limits.    Patient started taking oral iron 3 weeks ago due to iron sat 15% and Ferritin 16 despite Hgb 12. She is now having intermittent loose stools. She took loperamide 4mg x2 today after taking Pepto-bismol. No nocturnal events. No ongoing abdominal pain. Good appetite.       Prior to last office visit:  Patient initially presented to [facility] on 2/17/2024 with coffee ground emesis and melena concerning for an upper GI bleed.    CT scan was performed and revealed moderate bile duct dilatation and mild PD dilation of indeterminate acuity and gastrojejunal anastomosis at the proximal gastric body greater curvature but otherwise wnl. EGD (as detailed below) was grossly unremarkable except for an  erythematous red spot at the site of her anastomosis (gastrojejunostomy ?). Given her anemia and melena, she had underwent a colonoscopy one day later which again was grossly unrevealing including a normal TI and scattered tics but without any old or fresh blood throughout her examined colon. Ultimately, she was without any further bloody stools and only received 1 uPRBC during that admission. Her apixaban was restarted and her Hgb 8.9 at time of discharge.    In regards to her GI history, she denies any previous history of GI bleeding in the past. Reports a prior history of pyloric stenosis where she received previous upper endoscopies in [place] (via [facility]) with her last EGD as detailed below. States she eventual underwent surgery for correction a few years ago but unable to recall any details (possibly gastrojejunostomy?). States her symptoms resolved after her surgery and did quite well. Reports her last EGD was in 2019 and her screening colonoscopy was around that same time about 4-5 years ago.       Pertinent GI Records / Data Review:    EGD (coffee ground emesis, melena) 2/19/204: Normal esophagus, red spot at anastomosis (unclear what type of surgery), normal stomach, normal duodenum, without evidence of active bleeding    Colonoscopy (melena, acute anemia) 2/20/2024:   - Hemorrhoids found on perianal exam. - The examined portion of the ileum was normal. - Diverticulosis in the sigmoid colon and in the descending colon. - Non-bleeding internal hemorrhoids. - The examination was otherwise normal on direct and retroflexion views. - No specimens collected. - No blood or bleeding lesions identified.    CT Abd/pelvis w/ IV contrast 2/17/2024- Impression:  1.  Moderate bile duct dilation and mild pancreatic duct dilation, of  indeterminate acuity given absence of prior imaging. MRI/MRCP without and with  intravenous contrast can be considered if further imaging evaluation is desired.  2.  Additional chronic  findings as delineated above    Prior EGD (pyloric stenosis, continued symptoms of GOO, nausea) 7/2019- Impression:  - Refractory benign pyloric stricture with evidence of gastric outlet obstruction, s/p repeat balloon dilation (s/p TTS balloon up to 15 mm)  Rec'd: Consider surgical referral vs off-label temporary placement of covered metal stent (e.g. TTS esophageal or LAMS)     Social Hx:  - EtOH: Social occasions, glass of wine  - Smoking: Denies    Family Hx: Denies any family history of colon, pancreatic or other known GI malignancy    Care Team:  - PCP: Dr. Renée Pittman, DO     REVIEW OF SYSTEMS   Review of Systems  12 point ROS was otherwise negative except for those stated above per HPI     MEDICATIONS        Current Outpatient Medications:     ascorbic acid (ascorbic acid with harlan hips) 500 mg tablet, Take 1 tablet (500 mg total) by mouth daily., Disp: 90 tablet, Rfl: 3    atorvastatin (LIPITOR) 10 mg tablet, TAKE 1/2 TABLET  (5 MG TOTAL) BY MOUTH DAILY., Disp: 45 tablet, Rfl: 3    calcium carbonate-vitamin D3 (CALCIUM 600 WITH VITAMIN D3) 600 mg(1,500mg) -400 unit tablet,chewable, Take 600 mg by mouth once., Disp: , Rfl:     cholecalciferol, vitamin D3, 1,000 unit (25 mcg) tablet, Take 1 tablet (1,000 Units total) by mouth daily., Disp: 90 tablet, Rfl: 3    cyanocobalamin 1,000 mcg tablet, Take 1 tablet (1,000 mcg total) by mouth daily., Disp: 90 tablet, Rfl: 3    ferrous sulfate 325 mg (65 mg iron) EC tablet, Take 1 tablet (325 mg total) by mouth daily with breakfast., Disp: 90 tablet, Rfl: 3    metoprolol succinate XL (TOPROL-XL) 25 mg 24 hr tablet, Take 1 tablet (25 mg total) by mouth daily., Disp: 90 tablet, Rfl: 3    multivitamin capsule, Take 1 capsule by mouth daily., Disp: , Rfl:     omeprazole (PriLOSEC) 40 mg capsule, Take 1 capsule (40 mg total) by mouth 2 (two) times a day before breakfast and dinner., Disp: 180 capsule, Rfl: 3    SUMAtriptan (IMITREX) 50 mg tablet, Take 1 tablet (50 mg  "total) by mouth once as needed for migraine., Disp: 30 tablet, Rfl: 3    PHYSICAL EXAMINATION     Visit Vitals  /63 (BP Location: Left upper arm, Patient Position: Sitting)   Pulse 77   Resp 16   Ht 1.676 m (5' 6\")   Wt 65.3 kg (144 lb)   SpO2 97%   BMI 23.24 kg/m²     Physical Exam  Vitals reviewed.   Constitutional:       Appearance: Normal appearance.   HENT:      Head: Normocephalic and atraumatic.      Mouth/Throat:      Mouth: Mucous membranes are moist.      Pharynx: Oropharynx is clear.   Eyes:      Extraocular Movements: Extraocular movements intact.      Conjunctiva/sclera: Conjunctivae normal.      Pupils: Pupils are equal, round, and reactive to light.   Cardiovascular:      Rate and Rhythm: Normal rate.      Pulses: Normal pulses.   Pulmonary:      Effort: Pulmonary effort is normal.      Breath sounds: Normal breath sounds.   Abdominal:      General: Abdomen is flat. Bowel sounds are normal. There is no distension.      Palpations: Abdomen is soft.      Tenderness: There is no abdominal tenderness.   Skin:     General: Skin is warm and dry.   Neurological:      General: No focal deficit present.      Mental Status: She is alert and oriented to person, place, and time.   Psychiatric:         Mood and Affect: Mood normal.         Behavior: Behavior normal.            LABS / IMAGING/STUDIES      Labs Reviewed:   Lab Results   Component Value Date    GLUCOSE 90 06/28/2024    CALCIUM 9.4 06/28/2024     06/28/2024    K 4.7 06/28/2024    CO2 26 06/28/2024     (H) 06/28/2024    BUN 19 06/28/2024    CREATININE 0.7 06/28/2024     Lab Results   Component Value Date    WBC 5.15 07/26/2024    HGB 12.2 07/26/2024    HCT 41.8 07/26/2024    MCV 83.3 07/26/2024     07/26/2024     No results found for: \"HEPCAB\"    Studies/Imaging Reviewed: As detailed above         ASSESSMENT AND PLAN   Problem List Items Addressed This Visit   Ms Kunz is a 88 y.o female with pmh of HTN, HLD, pAF (on Eliquis), " hx of pyloric stenosis (s/p serial TTS balloon dilations several years ago 2019 with eventual surgery - gastrojejunostomy?), and recent hospitalization at [facility] (2/17- 2/20/24) with an acute UGIB (unclear etiology) who presents to the GI clinic for follow-up after her recent hospitalization.       #GI Hemorrhage #Non-variceal UGIB  #Eythematous Red Spot at GJ Anastomosis  #Acute Blood Loss Anemia  #CRC Screening    Patient presented to [facility] for CGE and melena on admission Feb 2024.  EGD was grossly unrevealing however suspect patient may have had a Dieulafoy's lesion given her overall presentation and given the red, protruded erythematous red spot visualized near her anastomosis within her stomach at the time of her endoscopy which may have represented an underlying arterial vessel (ie Dieulafoy's lesion).   Colonoscopy 2/20/2024 without any polyps or lesions, only noting a normal TI, few tics in sigmoid/descending, and hemorrhoids.   No family hx of CRC or personal history of high-risk adenomas    Anemia resolved on recent CBC 7/26/24 without further intervention/medications.     Plan:  - Continue Omeprazole 40 mg once daily. Favor indefinite PPI therapy given patient's recent severe UGIB, age, and while she remains on anticoagulation.  - Okay to continue Eliquis for her paroxsymal A Fib from a GI standpoint, however if she were to rebleed in the future would consider further risks vs benefits discussion  - If patient develops recurrent melena, bloody stools, and/or coffee ground emesis advised close ED return precautions  - Advised strict avoidance of all NSAIDs  - Defer iron supplementation to PCP.   - No indication for ongoing CRC screening given patient's age (88 y.o)   - No plans for further endoscopic evaluation at this time    #Abnormal CT Imaging  #Incidental Biliary and Pancreatic Duct Dilation  Seen on CT Abd/pelvis with moderate intrahepatic and extrahepatic bile duct dilation with CBD measuring 1.2 cm and  PD up to 0.5 cm without any discrete lesions or masses. Possibly in setting of prior CCY along with history of prior surgery (gastrojejunal anastomosis) and patient's age.LFTs wnl along with T Bili. No other red flag or other concerning symptoms.    MRI/MRCP for duct dilation on 4/5/24:   -Intrahepatic and extrahepatic bile ducts: Normal caliber with the common bile  duct measuring up to 0.6 cm and the common hepatic duct measuring up to 0.7 cm. No choledocholithiasis, biliary stricture or extrinsic compressing mass.  -Pancreas is normal in signal intensity and enhancement.  Pancreatic  duct caliber is normal. Normal pancreatic duct drainage pattern without evidence  of divisum. No restricted diffusion. 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 Pancreatic duct measures 0.2 cm within the body  and tail are obscured 0.3 cm just upstream from the ampulla Vater, within normal  limits.    Plan:  -Given the rarity of side-branch IPMNs without concerning features developing into malignancy as well as the patient's age and comorbitidities, further image surveillance is not warranted at this time. This was discussed with patient at visit today who is in agreement.       RTC: As needed for any GI concerns.        Cristiano Garcia, DO  08/08/24  3:30 PM

## 2024-09-24 ENCOUNTER — OFFICE VISIT (OUTPATIENT)
Dept: CARDIOLOGY | Facility: CLINIC | Age: 89
End: 2024-09-24
Payer: MEDICARE

## 2024-09-24 VITALS
HEIGHT: 66 IN | BODY MASS INDEX: 23.63 KG/M2 | HEART RATE: 74 BPM | RESPIRATION RATE: 18 BRPM | DIASTOLIC BLOOD PRESSURE: 70 MMHG | SYSTOLIC BLOOD PRESSURE: 120 MMHG | WEIGHT: 147 LBS

## 2024-09-24 DIAGNOSIS — E78.49 OTHER HYPERLIPIDEMIA: Primary | ICD-10-CM

## 2024-09-24 DIAGNOSIS — I10 ESSENTIAL HYPERTENSION: ICD-10-CM

## 2024-09-24 DIAGNOSIS — I10 PRIMARY HYPERTENSION: ICD-10-CM

## 2024-09-24 DIAGNOSIS — I48.0 PAROXYSMAL ATRIAL FIBRILLATION (CMS/HCC): ICD-10-CM

## 2024-09-24 LAB
ATRIAL RATE: 74
P AXIS: -71
PR INTERVAL: 160
QRS DURATION: 74
QT INTERVAL: 388
QTC CALCULATION(BAZETT): 430
R AXIS: 44
T WAVE AXIS: 48
VENTRICULAR RATE: 74

## 2024-09-24 PROCEDURE — 93000 ELECTROCARDIOGRAM COMPLETE: CPT | Performed by: INTERNAL MEDICINE

## 2024-09-24 PROCEDURE — 99214 OFFICE O/P EST MOD 30 MIN: CPT | Performed by: INTERNAL MEDICINE

## 2024-09-24 RX ORDER — METOPROLOL SUCCINATE 25 MG/1
25 TABLET, EXTENDED RELEASE ORAL NIGHTLY
COMMUNITY
Start: 2024-09-24 | End: 2024-12-19 | Stop reason: SDUPTHER

## 2024-09-24 ASSESSMENT — ENCOUNTER SYMPTOMS
IRREGULAR HEARTBEAT: 0
DYSPNEA ON EXERTION: 0
NEAR-SYNCOPE: 0
PALPITATIONS: 0
SYNCOPE: 0
LIGHT-HEADEDNESS: 0
ORTHOPNEA: 0
PND: 0
DIZZINESS: 0

## 2024-09-24 NOTE — ASSESSMENT & PLAN NOTE
Continue atorvastatin 10 mg daily.  Lipid profile in June revealed total cholesterol 172, triglycerides 247, HDL was 60 and LDL was 63.    Counseled on a low-fat low-cholesterol diet as well as walking for exercise.

## 2024-09-24 NOTE — LETTER
September 24, 2024     Renée Pittman DO  [address]  [place] [facility] [address]    Patient: Shalini Kunz  YOB: 1935  Date of Visit: 9/24/2024      Dear Dr. Pittman:    Thank you for referring Shalini Kunz to me for evaluation. Below are my notes for this consultation.    If you have questions, please do not hesitate to call me. I look forward to following your patient along with you.         Sincerely,        Ron Jose MD        CC: No Recipients    Ron Jose MD  9/24/2024  2:51 PM  Sign when Signing Visit     Cardiology Note       Reason for visit: Paroxysmal atrial fibrillation.      Shalini returns today for follow-up.  She had seen Dr Carias and her loop recorder has not shown any recent atrial fibrillation.  And clinically there has not been any recurrences.  She is off of anticoagulation for now.    They will continue to monitor her for atrial fibrillation through the ILR and if she has recurrence we can consider placing her back on short-term anticoagulation and reevaluate about having a watchman placed.    There has not been any black stools or bleeding.  She does feel fatigued.  Her most recent hemoglobin in July was 12.2.    There has not been any chest discomfort, shortness of breath, palpitations or dizziness.  No lower extremity edema.    On exam her blood pressure is 120/70.    EKG revealed an ectopic atrial rhythm but was otherwise unremarkable.        Past Medical History:   Diagnosis Date   • Anemia    • Arthritis    • Atrial fibrillation (CMS/HCC)     Only documented episode was during acute illness   • Deep vein thrombosis (CMS/HCC)     2019   • GI (gastrointestinal bleed)    • Hypertension    • Lipid disorder      Past Surgical History   Procedure Laterality Date   • Abdominal surgery      2019   • Adenoidectomy Bilateral    • Cholecystectomy  10/03/2011   • COLONOSCOPY W/ OR W/O BIOPSY N/A 2/20/2024    Performed by Yohana Harden MD at [facility] GI    • Eye surgery      2020    • Ganglion cyst excision     • Gastric bypass     • Loop recorder implant N/A 5/1/2024    Performed by Beni Carias MD at [facility] [place] CARDIAC CATH/EP   • Tonsillectomy     • UPPER GASTROINTESTINAL ENDOSCOPY WITH BIOPSY N/A 2/19/2024    Performed by Yohana Harden MD at [facility] GI     Social History     Tobacco Use   • Smoking status: Never   • Smokeless tobacco: Never   Vaping Use   • Vaping status: Never Used   Substance Use Topics   • Alcohol use: Yes     Alcohol/week: 1.0 standard drink of alcohol     Types: 1 Glasses of wine per week     Comment: half one at dinner    • Drug use: Never      Family History   Problem Relation Name Age of Onset   • Alzheimer's disease Biological Mother     • Heart failure Biological Father     • No Known Problems Biological Sister       Patient has no known allergies.  Current Outpatient Medications   Medication Instructions   • ascorbic acid (ASCORBIC ACID WITH NEREIDA HIPS) 500 mg, oral, Daily   • atorvastatin (LIPITOR) 10 mg tablet TAKE 1/2 TABLET  (5 MG TOTAL) BY MOUTH DAILY.   • calcium carbonate-vitamin D3 (CALCIUM 600 WITH VITAMIN D3) 600 mg(1,500mg) -400 unit tablet,chewable 600 mg, Once   • cholecalciferol (vitamin D3) 1,000 Units, Daily   • cyanocobalamin (VITAMIN B12) 1,000 mcg, Daily   • ferrous sulfate 325 mg, oral, Daily with breakfast   • metoprolol succinate XL (TOPROL-XL) 25 mg, Nightly   • multivitamin capsule 1 capsule, Daily   • omeprazole (PRILOSEC) 40 mg, oral, 2 times daily before breakfast and dinner   • SUMAtriptan (IMITREX) 50 mg, oral, Once as needed          Review of Systems   Constitutional:        Fatigue   Cardiovascular:  Negative for chest pain, dyspnea on exertion, irregular heartbeat, leg swelling, near-syncope, orthopnea, palpitations, paroxysmal nocturnal dyspnea and syncope.   Neurological:  Negative for dizziness and light-headedness.      Objective    Vitals:    09/24/24 1413   BP: 120/70   Pulse: 74   Resp: 18       Wt Readings from Last 3 Encounters:   09/24/24 66.7 kg (147 lb)   08/08/24 65.3 kg (144 lb)   08/05/24 65.3 kg (144 lb)      Physical Exam  Neck:      Vascular: No carotid bruit or JVD.   Cardiovascular:      Rate and Rhythm: Normal rate and regular rhythm.      Pulses:           Carotid pulses are 2+ on the right side and 2+ on the left side.       Dorsalis pedis pulses are 2+ on the right side and 2+ on the left side.        Posterior tibial pulses are 2+ on the right side and 2+ on the left side.      Heart sounds: S1 normal and S2 normal. No murmur heard.     No friction rub. No gallop.   Pulmonary:      Effort: Pulmonary effort is normal.      Breath sounds: Normal breath sounds.   Musculoskeletal:      Right lower leg: No edema.      Left lower leg: No edema.   Skin:     General: Skin is warm and dry.   Neurological:      Mental Status: She is alert.   Psychiatric:         Behavior: Behavior normal.                   Pharmacologic nuclear stress test.  1/29/2024.  •  Negative lexiscan ECG test. Nuclear images pending and are to be reported by radiology  •  Response to Stress: There were no arrhythmias during stress. There were no arrhythmias during recovery.  IMPRESSION:  No evidence for pharmacologically induced ischemia.  LVEF: 72%.     Transthoracic echocardiogram.  3/7/2023  •  Left Ventricle: Normal ventricle size. Normal wall thickness. Estimated EF 55%. No regional wall motion abnormalities. Grade I diastolic dysfunction.  •  Right Ventricle: Normal ventricle size. Low normal systolic function.  •  Left Atrium: Mildly dilated atrium.  •  Right Atrium: Normal sized atrium.  •  Aortic Valve: Tricuspid valve. Moderate regurgitation. No stenosis.  Aorta top normal 3.8 cm  •  Mitral Valve: Bileaflet thickening. Mild regurgitation. The jet is eccentric.  •  Tricuspid Valve: Normal structure. Mild regurgitation. Estimated RVSP = 27 mmHg.  •  Pulmonic Valve: Normal structure. Trace regurgitation.  •  IVC/SVC:  Inferior vena cava is <2.1cm. Inferior vena cava collapses >50% during inspiration.  •  Pericardium: Normal structure.      ECG.  Ectopic atrial rhythm otherwise normal EKG.     Assessment/Plan    Paroxysmal atrial fibrillation (CMS/HCC)  Shalini will remain off of anticoagulation.  She will continue to be monitored remotely through her loop recorder.    She will call with any questions or concerns or if she has any recurrent atrial fibrillation.    Other hyperlipidemia  Continue atorvastatin 10 mg daily.  Lipid profile in June revealed total cholesterol 172, triglycerides 247, HDL was 60 and LDL was 63.    Counseled on a low-fat low-cholesterol diet as well as walking for exercise.    Primary hypertension  Blood pressure is stable at 120/70.  She will continue on the Toprol XL 25 mg daily.    I told Shalini to try taking the metoprolol at night to see if it makes any difference in her symptoms of fatigue.  I also told her to make sure that she stays well-hydrated.    Shalini will be coming due for her day-to-day care.  She will return to see me in 6 months time or sooner if there is a problem.  I will continue to keep you informed of her progress.  She will call with any questions or concerns in the interim.            Thank you for allowing me to participate in the care of this patient.  If you have any questions please don't hesitate to contact me.    I spent 30 minutes on this date of service performing the following activities: obtaining history, performing examination, entering orders, documenting, preparing for visit, obtaining / reviewing records, providing counseling and education and communicating results.     Ron Jose MD [facility]   9/24/2024  2:51 PM

## 2024-09-24 NOTE — PATIENT INSTRUCTIONS
"Patient Education   DASH Eating Plan  DASH stands for Dietary Approaches to Stop Hypertension. The DASH eating plan is a healthy eating plan that has been shown to:  Reduce high blood pressure (hypertension).  Reduce your risk for type 2 diabetes, heart disease, and stroke.  Help with weight loss.  What are tips for following this plan?  Reading food labels  Check food labels for the amount of salt (sodium) per serving. Choose foods with less than 5 percent of the Daily Value of sodium. Generally, foods with less than 300 milligrams (mg) of sodium per serving fit into this eating plan.  To find whole grains, look for the word \"whole\" as the first word in the ingredient list.  Shopping  Buy products labeled as \"low-sodium\" or \"no salt added.\"  Buy fresh foods. Avoid canned foods and pre-made or frozen meals.  Cooking  Avoid adding salt when cooking. Use salt-free seasonings or herbs instead of table salt or sea salt. Check with your health care provider or pharmacist before using salt substitutes.  Do not kam foods. Cook foods using healthy methods such as baking, boiling, grilling, roasting, and broiling instead.  Cook with heart-healthy oils, such as olive, canola, avocado, soybean, or sunflower oil.  Meal planning    Eat a balanced diet that includes:  4 or more servings of fruits and 4 or more servings of vegetables each day. Try to fill one-half of your plate with fruits and vegetables.  6-8 servings of whole grains each day.  Less than 6 oz (170 g) of lean meat, poultry, or fish each day. A 3-oz (85-g) serving of meat is about the same size as a deck of cards. One egg equals 1 oz (28 g).  2-3 servings of low-fat dairy each day. One serving is 1 cup (237 mL).  1 serving of nuts, seeds, or beans 5 times each week.  2-3 servings of heart-healthy fats. Healthy fats called omega-3 fatty acids are found in foods such as walnuts, flaxseeds, fortified milks, and eggs. These fats are also found in cold-water fish, such " as sardines, salmon, and mackerel.  Limit how much you eat of:  Canned or prepackaged foods.  Food that is high in trans fat, such as some fried foods.  Food that is high in saturated fat, such as fatty meat.  Desserts and other sweets, sugary drinks, and other foods with added sugar.  Full-fat dairy products.  Do not salt foods before eating.  Do not eat more than 4 egg yolks a week.  Try to eat at least 2 vegetarian meals a week.  Eat more home-cooked food and less restaurant, buffet, and fast food.  Lifestyle  When eating at a restaurant, ask that your food be prepared with less salt or no salt, if possible.  If you drink alcohol:  Limit how much you use to:  0-1 drink a day for women who are not pregnant.  0-2 drinks a day for men.  Be aware of how much alcohol is in your drink. In the [place] one drink equals one 12 oz bottle of beer (355 mL), one 5 oz glass of wine (148 mL), or one 1½ oz glass of hard liquor (44 mL).  General information  Avoid eating more than 2,300 mg of salt a day. If you have hypertension, you may need to reduce your sodium intake to 1,500 mg a day.  Work with your health care provider to maintain a healthy body weight or to lose weight. Ask what an ideal weight is for you.  Get at least 30 minutes of exercise that causes your heart to beat faster (aerobic exercise) most days of the week. Activities may include walking, swimming, or biking.  Work with your health care provider or dietitian to adjust your eating plan to your individual calorie needs.  What foods should I eat?  Fruits  All fresh, dried, or frozen fruit. Canned fruit in natural juice (without added sugar).  Vegetables  Fresh or frozen vegetables (raw, steamed, roasted, or grilled). Low-sodium or reduced-sodium tomato and vegetable juice. Low-sodium or reduced-sodium tomato sauce and tomato paste. Low-sodium or reduced-sodium canned vegetables.  Grains  Whole-grain or whole-wheat bread. Whole-grain or whole-wheat pasta. Brown  rice. Oatmeal. Quinoa. Bulgur. Whole-grain and low-sodium cereals. Polina bread. Low-fat, low-sodium crackers. Whole-wheat flour tortillas.  Meats and other proteins  Skinless chicken or turkey. Ground chicken or turkey. Pork with fat trimmed off. Fish and seafood. Egg whites. Dried beans, peas, or lentils. Unsalted nuts, nut butters, and seeds. Unsalted canned beans. Lean cuts of beef with fat trimmed off. Low-sodium, lean precooked or cured meat, such as sausages or meat loaves.  Dairy  Low-fat (1%) or fat-free (skim) milk. Reduced-fat, low-fat, or fat-free cheeses. Nonfat, low-sodium ricotta or cottage cheese. Low-fat or nonfat yogurt. Low-fat, low-sodium cheese.  Fats and oils  Soft margarine without trans fats. Vegetable oil. Reduced-fat, low-fat, or light mayonnaise and salad dressings (reduced-sodium). Canola, safflower, olive, avocado, soybean, and sunflower oils. Avocado.  Seasonings and condiments  Herbs. Spices. Seasoning mixes without salt.  Other foods  Unsalted popcorn and pretzels. Fat-free sweets.  The items listed above may not be a complete list of foods and beverages you can eat. Contact a dietitian for more information.  What foods should I avoid?  Fruits  Canned fruit in a light or heavy syrup. Fried fruit. Fruit in cream or butter sauce.  Vegetables  Creamed or fried vegetables. Vegetables in a cheese sauce. Regular canned vegetables (not low-sodium or reduced-sodium). Regular canned tomato sauce and paste (not low-sodium or reduced-sodium). Regular tomato and vegetable juice (not low-sodium or reduced-sodium). Pickles. Olives.  Grains  Baked goods made with fat, such as croissants, muffins, or some breads. Dry pasta or rice meal packs.  Meats and other proteins  Fatty cuts of meat. Ribs. Fried meat. Vivar. Bologna, salami, and other precooked or cured meats, such as sausages or meat loaves. Fat from the back of a pig (fatback). Bratwurst. Salted nuts and seeds. Canned beans with added salt.  Canned or smoked fish. Whole eggs or egg yolks. Chicken or turkey with skin.  Dairy  Whole or 2% milk, cream, and half-and-half. Whole or full-fat cream cheese. Whole-fat or sweetened yogurt. Full-fat cheese. Nondairy creamers. Whipped toppings. Processed cheese and cheese spreads.  Fats and oils  Butter. Stick margarine. Lard. Shortening. Ghee. Vivar fat. Tropical oils, such as coconut, palm kernel, or palm oil.  Seasonings and condiments  Onion salt, garlic salt, seasoned salt, table salt, and sea salt. Worcestershire sauce. Tartar sauce. Barbecue sauce. Teriyaki sauce. Soy sauce, including reduced-sodium. Steak sauce. Canned and packaged gravies. Fish sauce. Oyster sauce. Cocktail sauce. Store-bought horseradish. Ketchup. Mustard. Meat flavorings and tenderizers. Bouillon cubes. Hot sauces. Pre-made or packaged marinades. Pre-made or packaged taco seasonings. Relishes. Regular salad dressings.  Other foods  Salted popcorn and pretzels.  The items listed above may not be a complete list of foods and beverages you should avoid. Contact a dietitian for more information.  Where to find more information  National Heart, Lung, and [employer] [place]: www.nhlbi.nih.gov  American Heart Association: www.heart.org  Academy of Nutrition and Dietetics: www.eatright.org  National Kidney Foundation: www.kidney.org  Summary  The DASH eating plan is a healthy eating plan that has been shown to reduce high blood pressure (hypertension). It may also reduce your risk for type 2 diabetes, heart disease, and stroke.  When on the DASH eating plan, aim to eat more fresh fruits and vegetables, whole grains, lean proteins, low-fat dairy, and heart-healthy fats.  With the DASH eating plan, you should limit salt (sodium) intake to 2,300 mg a day. If you have hypertension, you may need to reduce your sodium intake to 1,500 mg a day.  Work with your health care provider or dietitian to adjust your eating plan to your individual calorie  needs.  This information is not intended to replace advice given to you by your health care provider. Make sure you discuss any questions you have with your health care provider.  Document Revised: 11/20/2020 Document Reviewed: 11/20/2020  Elsevier Patient Education © 2023 [employer].

## 2024-09-24 NOTE — ASSESSMENT & PLAN NOTE
Blood pressure is stable at 120/70.  She will continue on the Toprol XL 25 mg daily.    I told Shalini to try taking the metoprolol at night to see if it makes any difference in her symptoms of fatigue.  I also told her to make sure that she stays well-hydrated.

## 2024-09-24 NOTE — ASSESSMENT & PLAN NOTE
Shalini will remain off of anticoagulation.  She will continue to be monitored remotely through her loop recorder.    She will call with any questions or concerns or if she has any recurrent atrial fibrillation.

## 2024-09-24 NOTE — PROGRESS NOTES
Cardiology Note       Reason for visit: Paroxysmal atrial fibrillation.      Shalini returns today for follow-up.  She had seen Dr Carias and her loop recorder has not shown any recent atrial fibrillation.  And clinically there has not been any recurrences.  She is off of anticoagulation for now.    They will continue to monitor her for atrial fibrillation through the ILR and if she has recurrence we can consider placing her back on short-term anticoagulation and reevaluate about having a watchman placed.    There has not been any black stools or bleeding.  She does feel fatigued.  Her most recent hemoglobin in July was 12.2.    There has not been any chest discomfort, shortness of breath, palpitations or dizziness.  No lower extremity edema.    On exam her blood pressure is 120/70.    EKG revealed an ectopic atrial rhythm but was otherwise unremarkable.        Past Medical History:   Diagnosis Date    Anemia     Arthritis     Atrial fibrillation (CMS/HCC)     Only documented episode was during acute illness    Deep vein thrombosis (CMS/HCC)     2019    GI (gastrointestinal bleed)     Hypertension     Lipid disorder      Past Surgical History   Procedure Laterality Date    Abdominal surgery      2019    Adenoidectomy Bilateral     Cholecystectomy  10/03/2011    COLONOSCOPY W/ OR W/O BIOPSY N/A 2/20/2024    Performed by Yohana Harden MD at [facility]    Eye surgery      2020     Ganglion cyst excision      Gastric bypass      Loop recorder implant N/A 5/1/2024    Performed by Beni Carias MD at [facility]ARDIAC CATH/EP    Tonsillectomy      UPPER GASTROINTESTINAL ENDOSCOPY WITH BIOPSY N/A 2/19/2024    Performed by Yohana Harden MD at [facility]     Social History     Tobacco Use    Smoking status: Never    Smokeless tobacco: Never   Vaping Use    Vaping status: Never Used   Substance Use Topics    Alcohol use: Yes     Alcohol/week: 1.0 standard drink of alcohol     Types: 1 Glasses of wine per week      Comment: half one at dinner     Drug use: Never      Family History   Problem Relation Name Age of Onset    Alzheimer's disease Biological Mother      Heart failure Biological Father      No Known Problems Biological Sister       Patient has no known allergies.  Current Outpatient Medications   Medication Instructions    ascorbic acid (ASCORBIC ACID WITH NEREIDA HIPS) 500 mg, oral, Daily    atorvastatin (LIPITOR) 10 mg tablet TAKE 1/2 TABLET  (5 MG TOTAL) BY MOUTH DAILY.    calcium carbonate-vitamin D3 (CALCIUM 600 WITH VITAMIN D3) 600 mg(1,500mg) -400 unit tablet,chewable 600 mg, Once    cholecalciferol (vitamin D3) 1,000 Units, Daily    cyanocobalamin (VITAMIN B12) 1,000 mcg, Daily    ferrous sulfate 325 mg, oral, Daily with breakfast    metoprolol succinate XL (TOPROL-XL) 25 mg, Nightly    multivitamin capsule 1 capsule, Daily    omeprazole (PRILOSEC) 40 mg, oral, 2 times daily before breakfast and dinner    SUMAtriptan (IMITREX) 50 mg, oral, Once as needed          Review of Systems   Constitutional:        Fatigue   Cardiovascular:  Negative for chest pain, dyspnea on exertion, irregular heartbeat, leg swelling, near-syncope, orthopnea, palpitations, paroxysmal nocturnal dyspnea and syncope.   Neurological:  Negative for dizziness and light-headedness.      Objective    Vitals:    09/24/24 1413   BP: 120/70   Pulse: 74   Resp: 18      Wt Readings from Last 3 Encounters:   09/24/24 66.7 kg (147 lb)   08/08/24 65.3 kg (144 lb)   08/05/24 65.3 kg (144 lb)      Physical Exam  Neck:      Vascular: No carotid bruit or JVD.   Cardiovascular:      Rate and Rhythm: Normal rate and regular rhythm.      Pulses:           Carotid pulses are 2+ on the right side and 2+ on the left side.       Dorsalis pedis pulses are 2+ on the right side and 2+ on the left side.        Posterior tibial pulses are 2+ on the right side and 2+ on the left side.      Heart sounds: S1 normal and S2 normal. No murmur heard.     No friction rub. No  gallop.   Pulmonary:      Effort: Pulmonary effort is normal.      Breath sounds: Normal breath sounds.   Musculoskeletal:      Right lower leg: No edema.      Left lower leg: No edema.   Skin:     General: Skin is warm and dry.   Neurological:      Mental Status: She is alert.   Psychiatric:         Behavior: Behavior normal.                   Pharmacologic nuclear stress test.  1/29/2024.    Negative lexiscan ECG test. Nuclear images pending and are to be reported by radiology    Response to Stress: There were no arrhythmias during stress. There were no arrhythmias during recovery.  IMPRESSION:  No evidence for pharmacologically induced ischemia.  LVEF: 72%.     Transthoracic echocardiogram.  3/7/2023    Left Ventricle: Normal ventricle size. Normal wall thickness. Estimated EF 55%. No regional wall motion abnormalities. Grade I diastolic dysfunction.    Right Ventricle: Normal ventricle size. Low normal systolic function.    Left Atrium: Mildly dilated atrium.    Right Atrium: Normal sized atrium.    Aortic Valve: Tricuspid valve. Moderate regurgitation. No stenosis.  Aorta top normal 3.8 cm    Mitral Valve: Bileaflet thickening. Mild regurgitation. The jet is eccentric.    Tricuspid Valve: Normal structure. Mild regurgitation. Estimated RVSP = 27 mmHg.    Pulmonic Valve: Normal structure. Trace regurgitation.    IVC/SVC: Inferior vena cava is <2.1cm. Inferior vena cava collapses >50% during inspiration.    Pericardium: Normal structure.      ECG.  Ectopic atrial rhythm otherwise normal EKG.     Assessment/Plan    Paroxysmal atrial fibrillation (CMS/HCC)  Shalini will remain off of anticoagulation.  She will continue to be monitored remotely through her loop recorder.    She will call with any questions or concerns or if she has any recurrent atrial fibrillation.    Other hyperlipidemia  Continue atorvastatin 10 mg daily.  Lipid profile in June revealed total cholesterol 172, triglycerides 247, HDL was 60 and LDL  was 63.    Counseled on a low-fat low-cholesterol diet as well as walking for exercise.    Primary hypertension  Blood pressure is stable at 120/70.  She will continue on the Toprol XL 25 mg daily.    I told Shalini to try taking the metoprolol at night to see if it makes any difference in her symptoms of fatigue.  I also told her to make sure that she stays well-hydrated.    Shalini will be coming due for her day-to-day care.  She will return to see me in 6 months time or sooner if there is a problem.  I will continue to keep you informed of her progress.  She will call with any questions or concerns in the interim.            Thank you for allowing me to participate in the care of this patient.  If you have any questions please don't hesitate to contact me.    I spent 30 minutes on this date of service performing the following activities: obtaining history, performing examination, entering orders, documenting, preparing for visit, obtaining / reviewing records, providing counseling and education and communicating results.     Ron Jose MD [facility]   9/24/2024  2:51 PM

## 2024-11-01 ENCOUNTER — LAB REQUISITION (OUTPATIENT)
Dept: LAB | Facility: HOSPITAL | Age: 89
End: 2024-11-01
Attending: INTERNAL MEDICINE
Payer: MEDICARE

## 2024-11-01 DIAGNOSIS — Z00.00 ENCOUNTER FOR GENERAL ADULT MEDICAL EXAMINATION WITHOUT ABNORMAL FINDINGS: ICD-10-CM

## 2024-11-01 LAB
BASOPHILS # BLD: 0.02 K/UL (ref 0.01–0.1)
BASOPHILS NFR BLD: 0.5 %
DIFFERENTIAL METHOD BLD: ABNORMAL
EOSINOPHIL # BLD: 0.05 K/UL (ref 0.04–0.36)
EOSINOPHIL NFR BLD: 1.4 %
ERYTHROCYTE [DISTWIDTH] IN BLOOD BY AUTOMATED COUNT: 13.5 % (ref 11.7–14.4)
FERRITIN SERPL-MCNC: 33 NG/ML (ref 11–250)
HCT VFR BLD AUTO: 45.4 % (ref 35–45)
HGB BLD-MCNC: 14.6 G/DL (ref 11.8–15.7)
IMM GRANULOCYTES # BLD AUTO: 0.02 K/UL (ref 0–0.08)
IMM GRANULOCYTES NFR BLD AUTO: 0.5 %
IRON SATN MFR SERPL: 22 % (ref 15–45)
IRON SERPL-MCNC: 81 UG/DL (ref 35–150)
LYMPHOCYTES # BLD: 1.17 K/UL (ref 1.2–3.5)
LYMPHOCYTES NFR BLD: 32.1 %
MCH RBC QN AUTO: 29.7 PG (ref 28–33.2)
MCHC RBC AUTO-ENTMCNC: 32.2 G/DL (ref 32.2–35.5)
MCV RBC AUTO: 92.5 FL (ref 83–98)
MONOCYTES # BLD: 0.39 K/UL (ref 0.28–0.8)
MONOCYTES NFR BLD: 10.7 %
NEUTROPHILS # BLD: 2 K/UL (ref 1.7–7)
NEUTS SEG NFR BLD: 54.8 %
NRBC BLD-RTO: 0 %
PLATELET # BLD AUTO: 194 K/UL (ref 150–369)
PMV BLD AUTO: 10.7 FL (ref 9.4–12.3)
RBC # BLD AUTO: 4.91 M/UL (ref 3.93–5.22)
TIBC SERPL-MCNC: 367 UG/DL (ref 270–460)
UIBC SERPL-MCNC: 286 UG/DL (ref 180–360)
WBC # BLD AUTO: 3.65 K/UL (ref 3.8–10.5)

## 2024-11-01 PROCEDURE — 36415 COLL VENOUS BLD VENIPUNCTURE: CPT | Performed by: INTERNAL MEDICINE

## 2024-11-01 PROCEDURE — 83550 IRON BINDING TEST: CPT | Performed by: INTERNAL MEDICINE

## 2024-11-01 PROCEDURE — 85025 COMPLETE CBC W/AUTO DIFF WBC: CPT | Performed by: INTERNAL MEDICINE

## 2024-11-01 PROCEDURE — 82728 ASSAY OF FERRITIN: CPT | Performed by: INTERNAL MEDICINE

## 2024-11-01 PROCEDURE — 83540 ASSAY OF IRON: CPT | Performed by: INTERNAL MEDICINE

## 2024-11-12 RX ORDER — SUMATRIPTAN SUCCINATE 50 MG/1
50 TABLET ORAL ONCE AS NEEDED
Qty: 30 TABLET | Refills: 1 | Status: SHIPPED | OUTPATIENT
Start: 2024-11-12

## 2024-12-10 ENCOUNTER — TELEPHONE (OUTPATIENT)
Dept: PRIMARY CARE | Facility: CLINIC | Age: 89
End: 2024-12-10
Payer: MEDICARE

## 2024-12-10 DIAGNOSIS — D50.9 IRON DEFICIENCY ANEMIA, UNSPECIFIED IRON DEFICIENCY ANEMIA TYPE: Primary | ICD-10-CM

## 2024-12-10 DIAGNOSIS — I10 PRIMARY HYPERTENSION: ICD-10-CM

## 2024-12-19 DIAGNOSIS — I48.0 PAROXYSMAL ATRIAL FIBRILLATION (CMS/HCC): ICD-10-CM

## 2024-12-19 DIAGNOSIS — I10 ESSENTIAL HYPERTENSION: ICD-10-CM

## 2024-12-19 RX ORDER — METOPROLOL SUCCINATE 25 MG/1
25 TABLET, EXTENDED RELEASE ORAL NIGHTLY
Qty: 90 TABLET | Refills: 3 | Status: SHIPPED | OUTPATIENT
Start: 2024-12-19

## 2024-12-19 NOTE — TELEPHONE ENCOUNTER
Medicine Refill Request [facility]    Name of Patient: Shalini Kunz    Caller's name/Relationship: self    Callback number: [phone]    Medication Name, Dosage, Supply: metoprolol succinate XL (TOPROL-XL) 25 mg 24 hr tablet     Quantity left: 0    Pharmacy: [facility]    Last Office Visit: 9/24/24  Last Consult Visit: Visit date not found  Last Telemedicine Visit: Visit date not found    Next Appointment: Visit date not found      Current Outpatient Medications:     ascorbic acid (ascorbic acid with harlan hips) 500 mg tablet, Take 1 tablet (500 mg total) by mouth daily., Disp: 90 tablet, Rfl: 3    atorvastatin (LIPITOR) 10 mg tablet, TAKE 1/2 TABLET  (5 MG TOTAL) BY MOUTH DAILY., Disp: 45 tablet, Rfl: 3    calcium carbonate-vitamin D3 (CALCIUM 600 WITH VITAMIN D3) 600 mg(1,500mg) -400 unit tablet,chewable, Take 600 mg by mouth once., Disp: , Rfl:     cholecalciferol, vitamin D3, 1,000 unit (25 mcg) tablet, Take 1 tablet (1,000 Units total) by mouth daily., Disp: 90 tablet, Rfl: 3    cyanocobalamin 1,000 mcg tablet, Take 1 tablet (1,000 mcg total) by mouth daily., Disp: 90 tablet, Rfl: 3    ferrous sulfate 325 mg (65 mg iron) EC tablet, Take 1 tablet (325 mg total) by mouth daily with breakfast., Disp: 90 tablet, Rfl: 3    metoprolol succinate XL (TOPROL-XL) 25 mg 24 hr tablet, Take 1 tablet (25 mg total) by mouth nightly., Disp: , Rfl:     multivitamin capsule, Take 1 capsule by mouth daily., Disp: , Rfl:     omeprazole (PriLOSEC) 40 mg capsule, Take 1 capsule (40 mg total) by mouth 2 (two) times a day before breakfast and dinner., Disp: 180 capsule, Rfl: 3    SUMAtriptan (IMITREX) 50 mg tablet, TAKE 1 TABLET (50 MG TOTAL) BY MOUTH ONCE AS NEEDED FOR MIGRAINE., Disp: 30 tablet, Rfl: 1      BP Readings from Last 3 Encounters:   09/24/24 120/70   08/08/24 137/63   08/05/24 138/74       Recent Lab results:  Lab Results   Component Value Date    CHOL 172 06/28/2024   ,   Lab Results   Component Value Date    HDL  60 06/28/2024   ,   Lab Results   Component Value Date    LDLCALC 63 06/28/2024   ,   Lab Results   Component Value Date    TRIG 247 (H) 06/28/2024        Lab Results   Component Value Date    GLUCOSE 90 06/28/2024   ,   Lab Results   Component Value Date    HGBA1C 5.6 06/28/2024         Lab Results   Component Value Date    CREATININE 0.7 06/28/2024       Lab Results   Component Value Date    TSH 3.50 06/28/2024

## 2024-12-20 ENCOUNTER — LAB REQUISITION (OUTPATIENT)
Dept: LAB | Facility: HOSPITAL | Age: 89
End: 2024-12-20
Attending: FAMILY MEDICINE
Payer: MEDICARE

## 2024-12-20 ENCOUNTER — OFFICE VISIT (OUTPATIENT)
Dept: INTERNAL MEDICINE | Facility: CLINIC | Age: 89
End: 2024-12-20
Payer: MEDICARE

## 2024-12-20 DIAGNOSIS — Z79.01 CHRONIC ANTICOAGULATION: ICD-10-CM

## 2024-12-20 DIAGNOSIS — I10 ESSENTIAL HYPERTENSION: ICD-10-CM

## 2024-12-20 DIAGNOSIS — E55.9 VITAMIN D DEFICIENCY: ICD-10-CM

## 2024-12-20 DIAGNOSIS — I10 ESSENTIAL (PRIMARY) HYPERTENSION: ICD-10-CM

## 2024-12-20 DIAGNOSIS — I48.19 PERSISTENT ATRIAL FIBRILLATION (CMS/HCC): Primary | ICD-10-CM

## 2024-12-20 DIAGNOSIS — Z95.818 IMPLANTABLE LOOP RECORDER PRESENT: ICD-10-CM

## 2024-12-20 DIAGNOSIS — E53.8 B12 DEFICIENCY: ICD-10-CM

## 2024-12-20 DIAGNOSIS — D50.9 IRON DEFICIENCY ANEMIA, UNSPECIFIED: ICD-10-CM

## 2024-12-20 DIAGNOSIS — R79.9 ABNORMAL FINDING OF BLOOD CHEMISTRY, UNSPECIFIED: ICD-10-CM

## 2024-12-20 DIAGNOSIS — D50.9 IRON DEFICIENCY ANEMIA, UNSPECIFIED IRON DEFICIENCY ANEMIA TYPE: ICD-10-CM

## 2024-12-20 LAB
ALBUMIN SERPL-MCNC: 4.3 G/DL (ref 3.5–5.7)
ALP SERPL-CCNC: 78 IU/L (ref 34–125)
ALT SERPL-CCNC: 13 IU/L (ref 7–52)
ANION GAP SERPL CALC-SCNC: 7 MEQ/L (ref 3–15)
AST SERPL-CCNC: 18 IU/L (ref 13–39)
BASOPHILS # BLD: 0.02 K/UL (ref 0.01–0.1)
BASOPHILS NFR BLD: 0.3 %
BILIRUB SERPL-MCNC: 0.7 MG/DL (ref 0.3–1.2)
BUN SERPL-MCNC: 22 MG/DL (ref 7–25)
CALCIUM SERPL-MCNC: 9 MG/DL (ref 8.6–10.3)
CHLORIDE SERPL-SCNC: 109 MEQ/L (ref 98–107)
CO2 SERPL-SCNC: 22 MEQ/L (ref 21–31)
CREAT SERPL-MCNC: 0.8 MG/DL (ref 0.6–1.2)
DIFFERENTIAL METHOD BLD: ABNORMAL
EGFRCR SERPLBLD CKD-EPI 2021: >60 ML/MIN/1.73M*2
EOSINOPHIL # BLD: 0.02 K/UL (ref 0.04–0.36)
EOSINOPHIL NFR BLD: 0.3 %
ERYTHROCYTE [DISTWIDTH] IN BLOOD BY AUTOMATED COUNT: 12.9 % (ref 11.7–14.4)
FERRITIN SERPL-MCNC: 70 NG/ML (ref 11–250)
GLUCOSE SERPL-MCNC: 97 MG/DL (ref 70–99)
HCT VFR BLD AUTO: 44.6 % (ref 35–45)
HGB BLD-MCNC: 14.5 G/DL (ref 11.8–15.7)
IMM GRANULOCYTES # BLD AUTO: 0.02 K/UL (ref 0–0.08)
IMM GRANULOCYTES NFR BLD AUTO: 0.3 %
IRON SATN MFR SERPL: 19 % (ref 15–45)
IRON SERPL-MCNC: 69 UG/DL (ref 35–150)
LYMPHOCYTES # BLD: 1.6 K/UL (ref 1.2–3.5)
LYMPHOCYTES NFR BLD: 27.1 %
MCH RBC QN AUTO: 30.5 PG (ref 28–33.2)
MCHC RBC AUTO-ENTMCNC: 32.5 G/DL (ref 32.2–35.5)
MCV RBC AUTO: 93.7 FL (ref 83–98)
MONOCYTES # BLD: 0.61 K/UL (ref 0.28–0.8)
MONOCYTES NFR BLD: 10.3 %
NEUTROPHILS # BLD: 3.63 K/UL (ref 1.7–7)
NEUTS SEG NFR BLD: 61.7 %
NRBC BLD-RTO: 0 %
PLATELET # BLD AUTO: 205 K/UL (ref 150–369)
PMV BLD AUTO: 10.8 FL (ref 9.4–12.3)
POTASSIUM SERPL-SCNC: 4.5 MEQ/L (ref 3.5–5.1)
PROT SERPL-MCNC: 6.4 G/DL (ref 6–8.2)
RBC # BLD AUTO: 4.76 M/UL (ref 3.93–5.22)
SODIUM SERPL-SCNC: 138 MEQ/L (ref 136–145)
TIBC SERPL-MCNC: 360 UG/DL (ref 270–460)
UIBC SERPL-MCNC: 291 UG/DL (ref 180–360)
WBC # BLD AUTO: 5.9 K/UL (ref 3.8–10.5)

## 2024-12-20 PROCEDURE — 80053 COMPREHEN METABOLIC PANEL: CPT | Performed by: FAMILY MEDICINE

## 2024-12-20 PROCEDURE — 83540 ASSAY OF IRON: CPT | Performed by: FAMILY MEDICINE

## 2024-12-20 PROCEDURE — 36415 COLL VENOUS BLD VENIPUNCTURE: CPT | Performed by: FAMILY MEDICINE

## 2024-12-20 PROCEDURE — 99214 OFFICE O/P EST MOD 30 MIN: CPT | Performed by: FAMILY MEDICINE

## 2024-12-20 PROCEDURE — G2211 COMPLEX E/M VISIT ADD ON: HCPCS | Performed by: FAMILY MEDICINE

## 2024-12-20 PROCEDURE — 85025 COMPLETE CBC W/AUTO DIFF WBC: CPT | Performed by: FAMILY MEDICINE

## 2024-12-20 PROCEDURE — 82728 ASSAY OF FERRITIN: CPT | Performed by: FAMILY MEDICINE

## 2024-12-20 RX ORDER — CLOTRIMAZOLE AND BETAMETHASONE DIPROPIONATE 10; .64 MG/G; MG/G
CREAM TOPICAL 2 TIMES DAILY
Qty: 45 G | Refills: 1 | Status: SHIPPED | OUTPATIENT
Start: 2024-12-20 | End: 2025-01-19

## 2024-12-20 NOTE — PROGRESS NOTES
OFFICE VISIT NOTE     LOAN BRAN DO        PATIENT NAME:Shalini Kunz  PATIENT : 1935  ANNE MARIE: 2024    CC: routine followup.         HPI   Shalini Kunz is a 89 y.o. female  With HTN, SVT and pyloric stricture seen for ER followup . She is a [facility] resident living with her .     Since last visit she was seen by rheumatology Dr. Gutierrez and received second Reclast infusion and plan to repeat DEXA scan around 2025.  She was seen by cardiologist Dr. Ron San 2024 plans to continue Toprol XL 25 mg daily and trial at nighttime to see if it helps with her symptoms of fatigue continue atorvastatin.  Has a loop recorder in place and remain off of anticoagulation.  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 san  - eliquis 2.5 po bid, metoprolol 25mg po daily    #hx of PE    #HLD- lipitor 5mg po daily        SOCIAL HISTORY:  [facility]   lives with    nightly wine      FUNCTIONAL HISTORY:  ADL   Feeding-I  Toileting-I  Dressing-I  Bathing-I  Transferring-I    IADL  Medications-I  Shopping-I  Finances-I  Cooking-I  Cleaning-I        ADVANCED CARE PLANNING:  Full   Advance care plan discussed and documented in the medical record          HEALTH MAINTENANCE    -- Pneumonia Immunization:utd   -- Influenza Immunization:utd  -- Shingles Immunization:  -- Colorectal cancer screening:completed  -- Breast Cancer screening:reviewed goc. completed  23  -- Cervical cancer screening:completed  -- Bone Health screening: Tells me she would like to continue DEXA scans 23 osteoporosis   -- Vision Screening:dr tse   --  Hearing Screening:  -- RSV vaccination: UTD      The following have been reviewed and updated as appropriate in this visit: active problem list, medication list, allergies, family history, social history, health maintenance            Past Medical History:   Diagnosis Date    Anemia     Arthritis     Atrial fibrillation (CMS/HCC)     Only documented episode was during acute illness    Deep vein thrombosis (CMS/HCC)     2019    GI (gastrointestinal bleed)     Hypertension     Lipid disorder        Past Surgical History   Procedure Laterality Date    Abdominal surgery      2019    Adenoidectomy Bilateral     Cholecystectomy  10/03/2011    COLONOSCOPY W/ OR W/O BIOPSY N/A 2/20/2024    Performed by Yohana Harden MD at [facility] GI    Eye surgery      2020     Ganglion cyst excision      Gastric bypass      Loop recorder implant N/A 5/1/2024    Performed by Beni Carias [facility] at [facility] [place] CATH/EP    Tonsillectomy      UPPER GASTROINTESTINAL ENDOSCOPY WITH BIOPSY N/A 2/19/2024    Performed by Yohana Harden MD at [facility] GI     Social History     Socioeconomic History    Marital status:      Spouse name: Not on file    Number of children: Not on file    Years of education: Not on file    Highest education level: Not on file   Occupational History    Not on file   Tobacco Use    Smoking status: Never    Smokeless tobacco: Never   Vaping Use    Vaping status: Never Used   Substance and Sexual Activity    Alcohol use: Yes     Alcohol/week: 1.0 standard drink of alcohol     Types: 1 Glasses of wine per week     Comment: half one at dinner     Drug use: Never    Sexual activity: Defer   Other Topics Concern    Not on file   Social History Narrative    Not on file     Social Drivers of Health     Financial Resource Strain: Not on file   Food Insecurity: No Food Insecurity (2/17/2024)    Hunger Vital Sign     Worried About Running Out of Food in the Last Year: Never true     Ran Out of Food in the Last Year:  Never true   Transportation Needs: No Transportation Needs (2/19/2024)    PRAPARE - Transportation     Lack of Transportation (Medical): No     Lack of Transportation (Non-Medical): No   Physical Activity: Not on file   Stress: Not on file   Social Connections: Not on file   Intimate Partner Violence: Not on file   Housing Stability: Low Risk  (2/19/2024)    Housing Stability Vital Sign     Unable to Pay for Housing in the Last Year: No     Number of Places Lived in the Last Year: 1     Unstable Housing in the Last Year: No         Family History   Problem Relation Name Age of Onset    Alzheimer's disease Biological Mother      Heart failure Biological Father      No Known Problems Biological Sister           No Known Allergies      Current Outpatient Medications   Medication Sig Dispense Refill    ascorbic acid (ascorbic acid with harlan hips) 500 mg tablet Take 1 tablet (500 mg total) by mouth daily. 90 tablet 3    atorvastatin (LIPITOR) 10 mg tablet TAKE 1/2 TABLET  (5 MG TOTAL) BY MOUTH DAILY. 45 tablet 3    calcium carbonate-vitamin D3 (CALCIUM 600 WITH VITAMIN D3) 600 mg(1,500mg) -400 unit tablet,chewable Take 600 mg by mouth once.      cholecalciferol, vitamin D3, 1,000 unit (25 mcg) tablet Take 1 tablet (1,000 Units total) by mouth daily. 90 tablet 3    cyanocobalamin 1,000 mcg tablet Take 1 tablet (1,000 mcg total) by mouth daily. 90 tablet 3    ferrous sulfate 325 mg (65 mg iron) EC tablet Take 1 tablet (325 mg total) by mouth daily with breakfast. 90 tablet 3    metoprolol succinate XL (TOPROL-XL) 25 mg 24 hr tablet Take 1 tablet (25 mg total) by mouth nightly. 90 tablet 3    multivitamin capsule Take 1 capsule by mouth daily.      omeprazole (PriLOSEC) 40 mg capsule Take 1 capsule (40 mg total) by mouth 2 (two) times a day before breakfast and dinner. 180 capsule 3    SUMAtriptan (IMITREX) 50 mg tablet TAKE 1 TABLET (50 MG TOTAL) BY MOUTH ONCE AS NEEDED FOR MIGRAINE. 30 tablet 1     No current  "facility-administered medications for this visit.       Review of Systems  ROS  See hpi.   General: Denies fatigue, weight loss or weight gain.    Head: Denies headaches trauma   Eyes: Denies blurry vision, diplopia, decreased vision.  Denies drainage or excessive tearing.  Ears: Denies tinnitis, decreased hearing, ear drainage  Nose: Denies rhinorrhea, epistaxi  Mouth: Denies dry mouth  Neck: Denies lumps, bumps.  Denies dysphagia, odynophagia  Pulmonary:  Denies shortness of breath, difficulty breathing, excessive drooling, change in sputum.   Cardiac: Denies chest pain, flip-flop sensation   GI/Abdomen: Denies vomit, nausea, diarrhea, constipation, hematochezia.  Denies abdominal pain.    Uro/: Denies hematuria, urgency, frequency, burning sensation with urination. Denies discharge.   Skin: Denies lumps, bumps, rash.   MSK:  Denies weakness, numbness, tingling.    Neuro: Denies confusion, vertigo.              VITALS  There were no vitals filed for this visit.  97%RA hr 73 126/60 97.3F    Wt Readings from Last 3 Encounters:   09/24/24 66.7 kg (147 lb)   08/08/24 65.3 kg (144 lb)   08/05/24 65.3 kg (144 lb)       Ht Readings from Last 3 Encounters:   09/24/24 1.676 m (5' 6\")   08/08/24 1.676 m (5' 6\")   08/05/24 1.676 m (5' 6\")       BP Readings from Last 3 Encounters:   09/24/24 120/70   08/08/24 137/63   08/05/24 138/74       Physical Exam  PHYSICAL EXAM  General: Appears well, in no distress. Pt is pleasant. Hygiene normal.  Head: NC/AT.   Eyes: Conjunctiva and sclera normal bilaterally.   Neck: Inspection normal. Trachea midline. supple, FROM without pain. No cervical lymphadenopathy.  No enlargement of thyroid.  Cardiac: regular, rate, and rhythm. No murmurs, rubs, or gallops.  Lungs: negative for respiratory distress with normal respiratory effort. Lungs clear to auscultation bilaterally. No wheezes, rales, or rhonchi. No intercostal retractions  Abdomen: +BS. Bowel sounds normal. Abdomen is soft, " "non-distended. No tenderness. No masses or organomegaly. No guarding.   Skin: warm and dry. No erythema or rash.  Extremities: No peripheral edema or extremity lymphadenopathy  MSK: 5/5 UE and LE b/l. Gait stable and intact. Sitting Balance stable.   Psych: linear. Normal mood and affect.  No SI/HI. AAOX3.           PERTINENT LABS, IMAGING    No new labs.  Lab Results   Component Value Date    WBC 3.65 (L) 11/01/2024    HGB 14.6 11/01/2024    HCT 45.4 (H) 11/01/2024    MCV 92.5 11/01/2024     11/01/2024         Chemistry        Component Value Date/Time     06/28/2024 0843    K 4.7 06/28/2024 0843     (H) 06/28/2024 0843    CO2 26 06/28/2024 0843    BUN 19 06/28/2024 0843    CREATININE 0.7 06/28/2024 0843        Component Value Date/Time    CALCIUM 9.4 06/28/2024 0843    ALKPHOS 56 06/28/2024 0843    AST 17 06/28/2024 0843    ALT 12 06/28/2024 0843    BILITOT 0.5 06/28/2024 0843            Lab Results   Component Value Date    CHOL 172 06/28/2024    CHOL 154 02/02/2024    CHOL 173 12/01/2023     Lab Results   Component Value Date    HDL 60 06/28/2024    HDL 57 02/02/2024    HDL 61 12/01/2023     Lab Results   Component Value Date    LDLCALC 63 06/28/2024    LDLCALC 58 02/02/2024    LDLCALC 72 12/01/2023     Lab Results   Component Value Date    TRIG 247 (H) 06/28/2024    TRIG 196 (H) 02/02/2024    TRIG 201 (H) 12/01/2023     No results found for: \"CHOLHDL\"    Lab Results   Component Value Date    TSH 3.50 06/28/2024       Lab Results   Component Value Date    HGBA1C 5.6 06/28/2024       No results found for: \"HAV\", \"HEPAIGM\", \"HEPBIGM\", \"HEPBCAB\", \"HBEAG\", \"HEPCAB\"    No results found for: \"MICROALBUR\", \"[id number]\"    No results found for this or any previous visit (from the past 24 hours).    No results found.              Immunization History   Administered Date(s) Administered    Pneumococcal Conjugate 13-Valent 09/11/2017    Pneumococcal Polysaccharide 04/02/2021    SARS-COV-2 (COVID-19) " VACCINE, Moderna Spikevax 09/24/2024    SARS-COV-2 (COVID19) VACCINE, PFIZER MONOVALENT 02/10/2021, 03/24/2021    Tdap 10/13/2008    Zoster 05/26/2009         Health Maintenance   Topic Date Due    Advance Care Planning  Never done    Falls Risk Screening  Never done    Zoster Vaccine (2 of 3) 07/21/2009    RSV Vaccine (1 - 1-dose 75+ series) Never done    DTaP, Tdap, and Td Vaccines (2 - Td or Tdap) 10/13/2018    Medicare Annual Wellness Visit  07/28/2024    Influenza Vaccine (1) Never done    Depression Screening  02/17/2025    DEXA Scan  08/22/2025    Pneumococcal (65 years and older)  Completed    COVID-19 Vaccine  Completed    Meningococcal ACWY  Aged Out    RSV <20 months  Aged Out    HIB Vaccines  Aged Out    Hepatitis B Vaccines  Aged Out    IPV Vaccines  Aged Out    HPV Vaccines  Aged Out            Diagnosis Plan   1. Persistent atrial fibrillation (CMS/HCC)  CBC and differential    Comprehensive metabolic panel    Hemoglobin A1c    Lipid panel    Vitamin D 25 hydroxy    Vitamin B12    TSH w reflex FT4    Ferritin      2. Essential hypertension  CBC and differential    Comprehensive metabolic panel    Hemoglobin A1c    Lipid panel    Vitamin D 25 hydroxy    Vitamin B12    TSH w reflex FT4    Ferritin      3. Chronic anticoagulation        4. Implantable loop recorder present  CBC and differential    Comprehensive metabolic panel    Hemoglobin A1c    Lipid panel    Vitamin D 25 hydroxy    Vitamin B12    TSH w reflex FT4    Ferritin      5. Vitamin D deficiency  Vitamin D 25 hydroxy      6. B12 deficiency  Vitamin B12      7. Iron deficiency anemia, unspecified iron deficiency anemia type  CBC and differential    Ferritin      8. Abnormal finding of blood chemistry, unspecified  Hemoglobin A1c              Shalini Kunz is a 89 y.o. female  With HTN, SVT and pyloric stricture seen for routine followup . She is a [facility] resident living with her .       Specialist   cardiology   jaswinder  audiology-[place]  podiatry-[place]  Gi dr shell- dr yu  Rheum dr penn  [facility] ret- dr ken waller-> dr keyanna zeng  ent dr jose alfredo barba      Hx of UGIB  Anemia  She was admitted to the hospital February 17, 2024 through February 20, 2024.  She presented with coffee-ground emesis and diarrhea and was found with hgb drop 10/5->8.7 cf a GI bleed.  Of note she is on Eliquis subtherapeutic dose for atrial fibrillation for prior GI bleed history.  She received 1 unit PRBC and underwent an EGD February 19 that did not show acute bleed did show erythema at her GJ anastomosis site and a colonoscopy on February 20 that also did not show an acute bleed.  Gastroenterology recommended lifelong proton pump inhibitor therapy and outpatient follow-up.  Her anticoagulant Eliquis was resumed on discharge with a hemoglobin of 8.9.  - repeat labs   - trial off of iron and reassess labs  - seen by GI dr yu   - cont ppi omeprazole 40mg lifelong        Memory changes   she feels she is in a fog and touble executing tasks   she came back from a wonderful trip [place]  Her son who usually is her support system is biking across [place]   adl and iadl-I  minicog normal  NS eval ordered per her request- spoke with yusuf to see why it hasn't been completed since order feb 2024 (mother with dementia)       moderate bile duct dilatation and mild pancreatic duct dilatation -GI ordered MRI and no need for repeat as they felt IPMNS ( dr mandeep yu)       afib  HTN  - dr duque -> dr san  cardiology  -no longer on AC for GIB risk  - metoprolol 25mg po daily    hx of PE    HLD- lipitor 5mg po daily,check level  January 2024 stress test negative    osteoporosis  vitamin D def- dec level 25 taking 1000 IU po daily  Rheum dr penn   reclast     b12 def-  reduce to QOD ,Repeat level    left wet mac deg- injections  right dry mac degen- eye    BCC-derm dr white, Right arm    migraine- prn sumatriptan 50mg  working well    GOO sp lap gastro-jejunostomy dr Sy July 2019 , PRN omepzole 40mg po daily  pyloric stenosis- dilation in past  sp CCY      MAW 7/28/23   clock draw normal   word recall 3/3  ADLs and IADLs independent  Social alcohol use denies tobacco use  She is driving without issues    Rates health is good.  Exercising regularly takes medications as prescribed denies falls    To do  Comprehensive labs 6 months  NS?      tdap  shinrix    I attest that this visit supports the complexity inherent to evaluation and management associated with medical care services that serve as the continuing focal point for all needed health care services and/or medical care services that are part of ongoing care related to this patient's single, serious condition or a complex condition.      No follow-ups on file.    Renée Pittman, DO

## 2025-02-09 NOTE — PROGRESS NOTES
Electrophysiology       Reason for visit: Follow up    HPI   Shalini Kunz is a 89 y.o. female who comes to the office today for follow-up of her [employer] loop recorder placed on 5/1/2024 to screen for atrial fibrillation.  She has 1 documented episode of atrial fibrillation during acute hospitalization complicated hospitalization for GI procedure, aspiration, and pulmonary embolism.  She then was maintained on Eliquis 2.5 mg twice daily and developed anemia and GI bleeding.  She is now status post [employer] loop recorder on 5/1/2024.  She has not had any atrial fibrillation since device implantation.  She is currently off anticoagulation.  Her primary cardiologist is Dr. Jose.  She overall feels well today.  She denies chest pain, shortness of breath, NESBITT, heart palpitations, lightheadedness, or syncope.      Past Medical History:   Diagnosis Date    Anemia     Arthritis     Atrial fibrillation (CMS/HCC)     Only documented episode was during acute illness    Deep vein thrombosis (CMS/HCC)     2019    GI (gastrointestinal bleed)     Hypertension     Lipid disorder      Past Surgical History   Procedure Laterality Date    Abdominal surgery      2019    Adenoidectomy Bilateral     Cholecystectomy  10/03/2011    COLONOSCOPY W/ OR W/O BIOPSY N/A 2/20/2024    Performed by Yohana Harden MD at [facility] GI    Eye surgery      2020     Ganglion cyst excision      Gastric bypass      Loop recorder implant N/A 5/1/2024    Performed by Beni Carias MD at [facility]ARDIAC CATH/EP    Tonsillectomy      UPPER GASTROINTESTINAL ENDOSCOPY WITH BIOPSY N/A 2/19/2024    Performed by Yohana Harden MD at [facility] GI     Allergies:  Patient has no known allergies.    Medications Ordered Prior to Encounter[1]  Social History     Socioeconomic History    Marital status:      Spouse name: None    Number of children: None    Years of education: None    Highest education level: None   Tobacco Use     Smoking status: Never    Smokeless tobacco: Never   Vaping Use    Vaping status: Never Used   Substance and Sexual Activity    Alcohol use: Yes     Alcohol/week: 1.0 standard drink of alcohol     Types: 1 Glasses of wine per week     Comment: half one at dinner     Drug use: Never    Sexual activity: Defer     Social Drivers of Health     Food Insecurity: No Food Insecurity (2/17/2024)    Hunger Vital Sign     Worried About Running Out of Food in the Last Year: Never true     Ran Out of Food in the Last Year: Never true   Transportation Needs: No Transportation Needs (2/19/2024)    PRAPARE - Transportation     Lack of Transportation (Medical): No     Lack of Transportation (Non-Medical): No   Housing Stability: Low Risk  (2/19/2024)    Housing Stability Vital Sign     Unable to Pay for Housing in the Last Year: No     Number of Places Lived in the Last Year: 1     Unstable Housing in the Last Year: No     Family History   Problem Relation Name Age of Onset    Alzheimer's disease Biological Mother      Heart failure Biological Father      No Known Problems Biological Sister       Review of Systems   Constitutional: Negative.    HENT: Negative.     Eyes: Negative.    Respiratory: Negative.  Negative for cough and shortness of breath.    Cardiovascular: Negative.  Negative for chest pain, palpitations and leg swelling.   Gastrointestinal: Negative.    Endocrine: Negative.    Genitourinary: Negative.    Musculoskeletal: Negative.    Skin: Negative.    Allergic/Immunologic: Negative.    Neurological: Negative.    Hematological: Negative.    Psychiatric/Behavioral: Negative.       Vitals:    02/10/25 1355   BP: 138/64   Pulse: 64     Wt Readings from Last 3 Encounters:   02/10/25 66.2 kg (146 lb)   09/24/24 66.7 kg (147 lb)   08/08/24 65.3 kg (144 lb)     Physical Exam  Vitals and nursing note reviewed.   Constitutional:       Appearance: She is well-developed.   HENT:      Head: Normocephalic and atraumatic.   Eyes:       Conjunctiva/sclera: Conjunctivae normal.      Pupils: Pupils are equal, round, and reactive to light.   Cardiovascular:      Rate and Rhythm: Normal rate and regular rhythm.      Pulses: Normal pulses and intact distal pulses.      Heart sounds: Normal heart sounds.   Pulmonary:      Effort: Pulmonary effort is normal.      Breath sounds: Normal breath sounds.   Abdominal:      General: Bowel sounds are normal.      Palpations: Abdomen is soft.   Musculoskeletal:         General: Normal range of motion.      Cervical back: Normal range of motion and neck supple.   Skin:     General: Skin is warm and dry.   Neurological:      General: No focal deficit present.      Mental Status: She is alert and oriented to person, place, and time.   Psychiatric:         Behavior: Behavior normal.         Thought Content: Thought content normal.         Judgment: Judgment normal.        Lab Results   Component Value Date    WBC 5.90 12/20/2024    HGB 14.5 12/20/2024    HCT 44.6 12/20/2024     12/20/2024    CHOL 172 06/28/2024    TRIG 247 (H) 06/28/2024    HDL 60 06/28/2024    LDLCALC 63 06/28/2024    ALT 13 12/20/2024    AST 18 12/20/2024     12/20/2024    K 4.5 12/20/2024    CREATININE 0.8 12/20/2024    BUN 22 12/20/2024    TSH 3.50 06/28/2024    INR 1.2 02/17/2024       Cardiac Imaging    TRANSTHORACIC ECHO (TTE) COMPLETE 03/07/2023    Interpretation Summary    Left Ventricle: Normal ventricle size. Normal wall thickness. Estimated EF 55%. No regional wall motion abnormalities. Grade I diastolic dysfunction.    Right Ventricle: Normal ventricle size. Low normal systolic function.    Left Atrium: Mildly dilated atrium.    Right Atrium: Normal sized atrium.    Aortic Valve: Tricuspid valve. Moderate regurgitation. No stenosis.  Aorta top normal 3.8 cm    Mitral Valve: Bileaflet thickening. Mild regurgitation. The jet is eccentric.    Tricuspid Valve: Normal structure. Mild regurgitation. Estimated RVSP = 27 mmHg.     Pulmonic Valve: Normal structure. Trace regurgitation.    IVC/SVC: Inferior vena cava is <2.1cm. Inferior vena cava collapses >50% during inspiration.    Pericardium: Normal structure.          ECG: Ectopic atrial rhythm    Other Cardiac Studies: As listed     Primary hypertension  Blood pressure is well-controlled today did not make any changes to her medications today.  She will continue to follow with her primary cardiologist Dr. Jose.    Paroxysmal atrial fibrillation (CMS/HCC)  She has a history of proximal atrial fibrillation from several years ago during acute hospitalization.  She has not had any known recurrence since status.  She underwent loop recorder implantation that has not shown any occurrence of atrial fibrillation.  We had discussion today about loop recorder removal and keeping device implanted for further screening.  Will screen for more episodes of atrial fibrillation and she will follow-up in the office with Dr. Carias in 1 year.    Implantable loop recorder present  She has a normally functioning Drivable loop recorder.  It is not connected recently.  I will have our device team reach out to her to work on getting her device reset to ensure we have adequate remote monitoring.      No orders of the defined types were placed in this encounter.    There are no discontinued medications.    This letter was generated using speech recognition software. Please excuse any typographical errors.       DIANA Enamorado  2/10/2025                            [1]   Current Outpatient Medications on File Prior to Visit   Medication Sig Dispense Refill    ascorbic acid (ascorbic acid with harlan hips) 500 mg tablet Take 1 tablet (500 mg total) by mouth daily. 90 tablet 3    atorvastatin (LIPITOR) 10 mg tablet TAKE 1/2 TABLET  (5 MG TOTAL) BY MOUTH DAILY. 45 tablet 3    calcium carbonate-vitamin D3 (CALCIUM 600 WITH VITAMIN D3) 600 mg(1,500mg) -400 unit tablet,chewable Take 600 mg by mouth  once.      cholecalciferol, vitamin D3, 1,000 unit (25 mcg) tablet Take 1 tablet (1,000 Units total) by mouth daily. 90 tablet 3    cyanocobalamin 1,000 mcg tablet Take 1 tablet (1,000 mcg total) by mouth daily. 90 tablet 3    ferrous sulfate 325 mg (65 mg iron) EC tablet Take 1 tablet (325 mg total) by mouth daily with breakfast. 90 tablet 3    metoprolol succinate XL (TOPROL-XL) 25 mg 24 hr tablet Take 1 tablet (25 mg total) by mouth nightly. 90 tablet 3    multivitamin capsule Take 1 capsule by mouth daily.      omeprazole (PriLOSEC) 40 mg capsule Take 1 capsule (40 mg total) by mouth 2 (two) times a day before breakfast and dinner. 180 capsule 3    SUMAtriptan (IMITREX) 50 mg tablet TAKE 1 TABLET (50 MG TOTAL) BY MOUTH ONCE AS NEEDED FOR MIGRAINE. 30 tablet 1     No current facility-administered medications on file prior to visit.

## 2025-02-10 ENCOUNTER — OFFICE VISIT (OUTPATIENT)
Dept: CARDIOLOGY | Facility: CLINIC | Age: 89
End: 2025-02-10
Payer: MEDICARE

## 2025-02-10 VITALS
HEIGHT: 66 IN | DIASTOLIC BLOOD PRESSURE: 64 MMHG | BODY MASS INDEX: 23.46 KG/M2 | SYSTOLIC BLOOD PRESSURE: 138 MMHG | HEART RATE: 64 BPM | WEIGHT: 146 LBS

## 2025-02-10 DIAGNOSIS — Z95.818 IMPLANTABLE LOOP RECORDER PRESENT: ICD-10-CM

## 2025-02-10 DIAGNOSIS — I48.0 PAROXYSMAL ATRIAL FIBRILLATION (CMS/HCC): ICD-10-CM

## 2025-02-10 DIAGNOSIS — I10 PRIMARY HYPERTENSION: ICD-10-CM

## 2025-02-10 DIAGNOSIS — E78.49 OTHER HYPERLIPIDEMIA: Primary | ICD-10-CM

## 2025-02-10 LAB
ATRIAL RATE: 64
PR INTERVAL: 178
QRS DURATION: 74
QT INTERVAL: 396
QTC CALCULATION(BAZETT): 408
R AXIS: 51
T WAVE AXIS: 49
VENTRICULAR RATE: 64

## 2025-02-10 PROCEDURE — 99213 OFFICE O/P EST LOW 20 MIN: CPT | Performed by: NURSE PRACTITIONER

## 2025-02-10 PROCEDURE — 93000 ELECTROCARDIOGRAM COMPLETE: CPT | Performed by: NURSE PRACTITIONER

## 2025-02-10 ASSESSMENT — ENCOUNTER SYMPTOMS
CARDIOVASCULAR NEGATIVE: 1
GASTROINTESTINAL NEGATIVE: 1
ENDOCRINE NEGATIVE: 1
EYES NEGATIVE: 1
CONSTITUTIONAL NEGATIVE: 1
RESPIRATORY NEGATIVE: 1
SHORTNESS OF BREATH: 0
PALPITATIONS: 0
PSYCHIATRIC NEGATIVE: 1
COUGH: 0
ALLERGIC/IMMUNOLOGIC NEGATIVE: 1
MUSCULOSKELETAL NEGATIVE: 1
NEUROLOGICAL NEGATIVE: 1
HEMATOLOGIC/LYMPHATIC NEGATIVE: 1

## 2025-02-10 NOTE — ASSESSMENT & PLAN NOTE
Blood pressure is well-controlled today did not make any changes to her medications today.  She will continue to follow with her primary cardiologist Dr. Jose.

## 2025-02-10 NOTE — ASSESSMENT & PLAN NOTE
She has a history of proximal atrial fibrillation from several years ago during acute hospitalization.  She has not had any known recurrence since status.  She underwent loop recorder implantation that has not shown any occurrence of atrial fibrillation.  We had discussion today about loop recorder removal and keeping device implanted for further screening.  Will screen for more episodes of atrial fibrillation and she will follow-up in the office with Dr. Carias in 1 year.

## 2025-02-10 NOTE — ASSESSMENT & PLAN NOTE
She has a normally functioning [employer] loop recorder.  It is not connected recently.  I will have our device team reach out to her to work on getting her device reset to ensure we have adequate remote monitoring.

## 2025-02-19 NOTE — TELEPHONE ENCOUNTER
Medicine Refill Request    Last Office Visit: Visit date not found   Last Consult Visit: Visit date not found  Last Telemedicine Visit: Visit date not found    Next Appointment: Visit date not found      Current Outpatient Medications:     ascorbic acid (ascorbic acid with harlan hips) 500 mg tablet, Take 1 tablet (500 mg total) by mouth daily., Disp: 90 tablet, Rfl: 3    atorvastatin (LIPITOR) 10 mg tablet, TAKE 1/2 TABLET  (5 MG TOTAL) BY MOUTH DAILY., Disp: 45 tablet, Rfl: 3    calcium carbonate-vitamin D3 (CALCIUM 600 WITH VITAMIN D3) 600 mg(1,500mg) -400 unit tablet,chewable, Take 600 mg by mouth once., Disp: , Rfl:     cholecalciferol, vitamin D3, 1,000 unit (25 mcg) tablet, Take 1 tablet (1,000 Units total) by mouth daily., Disp: 90 tablet, Rfl: 3    cyanocobalamin 1,000 mcg tablet, Take 1 tablet (1,000 mcg total) by mouth daily., Disp: 90 tablet, Rfl: 3    ferrous sulfate 325 mg (65 mg iron) EC tablet, Take 1 tablet (325 mg total) by mouth daily with breakfast., Disp: 90 tablet, Rfl: 3    metoprolol succinate XL (TOPROL-XL) 25 mg 24 hr tablet, Take 1 tablet (25 mg total) by mouth nightly., Disp: 90 tablet, Rfl: 3    multivitamin capsule, Take 1 capsule by mouth daily., Disp: , Rfl:     omeprazole (PriLOSEC) 40 mg capsule, Take 1 capsule (40 mg total) by mouth 2 (two) times a day before breakfast and dinner., Disp: 180 capsule, Rfl: 3    SUMAtriptan (IMITREX) 50 mg tablet, TAKE 1 TABLET (50 MG TOTAL) BY MOUTH ONCE AS NEEDED FOR MIGRAINE., Disp: 30 tablet, Rfl: 1    BP Readings from Last 3 Encounters:   02/10/25 138/64   09/24/24 120/70   08/08/24 137/63       Recent Lab results:  Lab Results   Component Value Date    CHOL 172 06/28/2024   ,   Lab Results   Component Value Date    HDL 60 06/28/2024   ,   Lab Results   Component Value Date    LDLCALC 63 06/28/2024   ,   Lab Results   Component Value Date    TRIG 247 (H) 06/28/2024        Lab Results   Component Value Date    GLUCOSE 97 12/20/2024   ,   Lab  Results   Component Value Date    HGBA1C 5.6 06/28/2024         Lab Results   Component Value Date    CREATININE 0.8 12/20/2024       Lab Results   Component Value Date    TSH 3.50 06/28/2024           Lab Results   Component Value Date    HGBA1C 5.6 06/28/2024

## 2025-02-20 RX ORDER — ATORVASTATIN CALCIUM 10 MG/1
10 TABLET, FILM COATED ORAL DAILY
Qty: 90 TABLET | Refills: 3 | OUTPATIENT
Start: 2025-02-20

## 2025-03-19 NOTE — PROGRESS NOTES
Cardiology Note       Reason for visit: Paroxysmal atrial fibrillation      Shalini returns today for follow-up.  She tells me she has been doing well without any chest discomfort or shortness of breath.  There has not been any palpitations or skipped beats.    When she had her loop recorder interrogated there was no evidence of atrial fibrillation.  She is concerned that she has not been getting followed by remote monitoring.  As a said they would be in touch with her and she has not heard from them as of yet.    Shalini has not had any recent hospitalizations or emergency room visits.  No neurologic or TIA-like symptoms.    On exam in the office her blood pressure is 126/80.    The EKG revealed normal sinus rhythm and was normal.        Past Medical History:   Diagnosis Date    Anemia     Arthritis     Atrial fibrillation (CMS/HCC)     Only documented episode was during acute illness    Deep vein thrombosis (CMS/HCC)     2019    GI (gastrointestinal bleed)     Hypertension     Lipid disorder      Past Surgical History   Procedure Laterality Date    Abdominal surgery      2019    Adenoidectomy Bilateral     Cholecystectomy  10/03/2011    COLONOSCOPY W/ OR W/O BIOPSY N/A 2/20/2024    Performed by Yohana Harden MD at [facility]    Eye surgery      2020     Ganglion cyst excision      Gastric bypass      Loop recorder implant N/A 5/1/2024    Performed by Beni Carias MD at [facility] CATH/EP    Tonsillectomy      UPPER GASTROINTESTINAL ENDOSCOPY WITH BIOPSY N/A 2/19/2024    Performed by Yohana Harden MD at [facility] GI     Social History     Tobacco Use    Smoking status: Never    Smokeless tobacco: Never   Vaping Use    Vaping status: Never Used   Substance Use Topics    Alcohol use: Yes     Alcohol/week: 1.0 standard drink of alcohol     Types: 1 Glasses of wine per week     Comment: half one at dinner     Drug use: Never      Family History   Problem Relation Name Age of Onset    Alzheimer's disease  Biological Mother      Heart failure Biological Father      No Known Problems Biological Sister       Patient has no known allergies.  Current Outpatient Medications   Medication Instructions    acetaminophen (TYLENOL EXTRA STRENGTH ORAL) As needed    atorvastatin (LIPITOR) 10 mg tablet TAKE 1/2 TABLET  (5 MG TOTAL) BY MOUTH DAILY.    calcium carbonate-vitamin D3 (CALCIUM 600 WITH VITAMIN D3) 600 mg(1,500mg) -400 unit tablet,chewable 600 mg, Once    cholecalciferol (vitamin D3) 1,000 Units, Daily    IBUPROFEN ORAL As needed    metoprolol succinate XL (TOPROL-XL) 25 mg, oral, Nightly    multivitamin capsule 1 capsule, Daily    omeprazole (PRILOSEC) 40 mg, oral, 2 times daily before breakfast and dinner    SUMAtriptan (IMITREX) 50 mg, oral, Once as needed          Review of Systems   Cardiovascular:  Negative for chest pain, dyspnea on exertion, irregular heartbeat, leg swelling, near-syncope, orthopnea, palpitations, paroxysmal nocturnal dyspnea and syncope.   Neurological:  Negative for dizziness and light-headedness.      Objective    Vitals:    03/25/25 1135   BP: 126/80   Pulse: 86   SpO2: 97%      Wt Readings from Last 3 Encounters:   03/25/25 66.7 kg (147 lb)   02/10/25 66.2 kg (146 lb)   09/24/24 66.7 kg (147 lb)      Physical Exam  Neck:      Vascular: No carotid bruit or JVD.   Cardiovascular:      Rate and Rhythm: Normal rate and regular rhythm.      Pulses:           Carotid pulses are 2+ on the right side and 2+ on the left side.       Dorsalis pedis pulses are 2+ on the right side and 2+ on the left side.        Posterior tibial pulses are 2+ on the right side and 2+ on the left side.      Heart sounds: S1 normal and S2 normal. No murmur heard.     No friction rub. No gallop.   Pulmonary:      Effort: Pulmonary effort is normal.      Breath sounds: Normal breath sounds.   Musculoskeletal:      Right lower leg: No edema.      Left lower leg: No edema.   Skin:     General: Skin is warm and dry.    Neurological:      Mental Status: She is alert.   Psychiatric:         Behavior: Behavior normal.                   Implantation of a loop recorder.  5/1/2024   [employer] subcutaneous loop recorder implant       Pharmacologic nuclear stress test.  1/29/2024.    Negative lexiscan ECG test. Nuclear images pending and are to be reported by radiology    Response to Stress: There were no arrhythmias during stress. There were no arrhythmias during recovery.  IMPRESSION:  No evidence for pharmacologically induced ischemia.  LVEF: 72%.     Transthoracic echocardiogram.  3/7/2023    Left Ventricle: Normal ventricle size. Normal wall thickness. Estimated EF 55%. No regional wall motion abnormalities. Grade I diastolic dysfunction.    Right Ventricle: Normal ventricle size. Low normal systolic function.    Left Atrium: Mildly dilated atrium.    Right Atrium: Normal sized atrium.    Aortic Valve: Tricuspid valve. Moderate regurgitation. No stenosis.  Aorta top normal 3.8 cm    Mitral Valve: Bileaflet thickening. Mild regurgitation. The jet is eccentric.    Tricuspid Valve: Normal structure. Mild regurgitation. Estimated RVSP = 27 mmHg.    Pulmonic Valve: Normal structure. Trace regurgitation.    IVC/SVC: Inferior vena cava is <2.1cm. Inferior vena cava collapses >50% during inspiration.    Pericardium: Normal structure      ECG.  Normal sinus rhythm.  Normal EKG     Assessment/Plan    Paroxysmal atrial fibrillation (CMS/HCC)  Shalini will continue to be monitored with the loop recorder.    I will reach out to EPS in regards to her continuing with remote monitoring.    She will continue on the Toprol 25 mg daily.    Primary hypertension  Blood pressure was within the normal range.  Continue on the Toprol XL 25 mg daily.    Other hyperlipidemia  Continue atorvastatin 10 mg daily.  I have given Shalini a prescription to get a follow-up lipid profile and CMP.  I encouraged her to walk for exercise and have given her  information about a low-fat low-cholesterol low-salt diet.    Overall, Shalini remains stable.  She will be coming to for her day-to-day care.  She will return to see me in 8 months time or sooner if there is a problem.  I will call her with the results of her blood work after it is performed.  She will call with any questions or concerns in the interim.            Thank you for allowing me to participate in the care of this patient.  If you have any questions please don't hesitate to contact me.    I spent 30 minutes on this date of service performing the following activities: obtaining history, performing examination, entering orders, documenting, preparing for visit, obtaining / reviewing records, providing counseling and education and communicating results.    I attest that this visit supports the complexity inherent to evaluation and management associated with medical care services that serve as the continuing focal point for all needed health care services and/or medical care services that are part of ongoing care related to this patient's single, serious condition or a complex condition.      Ron Jose MD [facility]   3/25/2025  11:54 AM

## 2025-03-25 ENCOUNTER — OFFICE VISIT (OUTPATIENT)
Dept: CARDIOLOGY | Facility: CLINIC | Age: 89
End: 2025-03-25
Payer: MEDICARE

## 2025-03-25 VITALS
OXYGEN SATURATION: 97 % | WEIGHT: 147 LBS | DIASTOLIC BLOOD PRESSURE: 80 MMHG | SYSTOLIC BLOOD PRESSURE: 126 MMHG | HEART RATE: 86 BPM | BODY MASS INDEX: 23.63 KG/M2 | HEIGHT: 66 IN

## 2025-03-25 DIAGNOSIS — I48.0 PAROXYSMAL ATRIAL FIBRILLATION (CMS/HCC): ICD-10-CM

## 2025-03-25 DIAGNOSIS — I10 PRIMARY HYPERTENSION: Primary | ICD-10-CM

## 2025-03-25 DIAGNOSIS — E78.49 OTHER HYPERLIPIDEMIA: ICD-10-CM

## 2025-03-25 DIAGNOSIS — Z79.01 CHRONIC ANTICOAGULATION: ICD-10-CM

## 2025-03-25 DIAGNOSIS — I48.19 PERSISTENT ATRIAL FIBRILLATION (CMS/HCC): ICD-10-CM

## 2025-03-25 DIAGNOSIS — I10 ESSENTIAL HYPERTENSION: ICD-10-CM

## 2025-03-25 LAB
ATRIAL RATE: 61
PR INTERVAL: 178
QRS DURATION: 74
QT INTERVAL: 392
QTC CALCULATION(BAZETT): 394
R AXIS: 73
T WAVE AXIS: 43
VENTRICULAR RATE: 61

## 2025-03-25 PROCEDURE — 93000 ELECTROCARDIOGRAM COMPLETE: CPT | Performed by: INTERNAL MEDICINE

## 2025-03-25 PROCEDURE — G2211 COMPLEX E/M VISIT ADD ON: HCPCS | Performed by: INTERNAL MEDICINE

## 2025-03-25 PROCEDURE — 99214 OFFICE O/P EST MOD 30 MIN: CPT | Performed by: INTERNAL MEDICINE

## 2025-03-25 ASSESSMENT — ENCOUNTER SYMPTOMS
ORTHOPNEA: 0
DIZZINESS: 0
PALPITATIONS: 0
IRREGULAR HEARTBEAT: 0
NEAR-SYNCOPE: 0
DYSPNEA ON EXERTION: 0
LIGHT-HEADEDNESS: 0
SYNCOPE: 0
PND: 0

## 2025-03-25 NOTE — ASSESSMENT & PLAN NOTE
Shalini will continue to be monitored with the loop recorder.    I will reach out to EPS in regards to her continuing with remote monitoring.    She will continue on the Toprol 25 mg daily.

## 2025-03-25 NOTE — PATIENT INSTRUCTIONS
"Patient Education   DASH Eating Plan  DASH stands for Dietary Approaches to Stop Hypertension. The DASH eating plan is a healthy eating plan that has been shown to:  Reduce high blood pressure (hypertension).  Reduce your risk for type 2 diabetes, heart disease, and stroke.  Help with weight loss.  What are tips for following this plan?  Reading food labels  Check food labels for the amount of salt (sodium) per serving. Choose foods with less than 5 percent of the Daily Value of sodium. Generally, foods with less than 300 milligrams (mg) of sodium per serving fit into this eating plan.  To find whole grains, look for the word \"whole\" as the first word in the ingredient list.  Shopping  Buy products labeled as \"low-sodium\" or \"no salt added.\"  Buy fresh foods. Avoid canned foods and pre-made or frozen meals.  Cooking  Avoid adding salt when cooking. Use salt-free seasonings or herbs instead of table salt or sea salt. Check with your health care provider or pharmacist before using salt substitutes.  Do not kam foods. Cook foods using healthy methods such as baking, boiling, grilling, roasting, and broiling instead.  Cook with heart-healthy oils, such as olive, canola, avocado, soybean, or sunflower oil.  Meal planning    Eat a balanced diet that includes:  4 or more servings of fruits and 4 or more servings of vegetables each day. Try to fill one-half of your plate with fruits and vegetables.  6-8 servings of whole grains each day.  Less than 6 oz (170 g) of lean meat, poultry, or fish each day. A 3-oz (85-g) serving of meat is about the same size as a deck of cards. One egg equals 1 oz (28 g).  2-3 servings of low-fat dairy each day. One serving is 1 cup (237 mL).  1 serving of nuts, seeds, or beans 5 times each week.  2-3 servings of heart-healthy fats. Healthy fats called omega-3 fatty acids are found in foods such as walnuts, flaxseeds, fortified milks, and eggs. These fats are also found in cold-water fish, such " as sardines, salmon, and mackerel.  Limit how much you eat of:  Canned or prepackaged foods.  Food that is high in trans fat, such as some fried foods.  Food that is high in saturated fat, such as fatty meat.  Desserts and other sweets, sugary drinks, and other foods with added sugar.  Full-fat dairy products.  Do not salt foods before eating.  Do not eat more than 4 egg yolks a week.  Try to eat at least 2 vegetarian meals a week.  Eat more home-cooked food and less restaurant, buffet, and fast food.  Lifestyle  When eating at a restaurant, ask that your food be prepared with less salt or no salt, if possible.  If you drink alcohol:  Limit how much you use to:  0-1 drink a day for women who are not pregnant.  0-2 drinks a day for men.  Be aware of how much alcohol is in your drink. In the [place] one drink equals one 12 oz bottle of beer (355 mL), one 5 oz glass of wine (148 mL), or one 1½ oz glass of hard liquor (44 mL).  General information  Avoid eating more than 2,300 mg of salt a day. If you have hypertension, you may need to reduce your sodium intake to 1,500 mg a day.  Work with your health care provider to maintain a healthy body weight or to lose weight. Ask what an ideal weight is for you.  Get at least 30 minutes of exercise that causes your heart to beat faster (aerobic exercise) most days of the week. Activities may include walking, swimming, or biking.  Work with your health care provider or dietitian to adjust your eating plan to your individual calorie needs.  What foods should I eat?  Fruits  All fresh, dried, or frozen fruit. Canned fruit in natural juice (without added sugar).  Vegetables  Fresh or frozen vegetables (raw, steamed, roasted, or grilled). Low-sodium or reduced-sodium tomato and vegetable juice. Low-sodium or reduced-sodium tomato sauce and tomato paste. Low-sodium or reduced-sodium canned vegetables.  Grains  Whole-grain or whole-wheat bread. Whole-grain or whole-wheat pasta. Brown  rice. Oatmeal. Quinoa. Bulgur. Whole-grain and low-sodium cereals. Polina bread. Low-fat, low-sodium crackers. Whole-wheat flour tortillas.  Meats and other proteins  Skinless chicken or turkey. Ground chicken or turkey. Pork with fat trimmed off. Fish and seafood. Egg whites. Dried beans, peas, or lentils. Unsalted nuts, nut butters, and seeds. Unsalted canned beans. Lean cuts of beef with fat trimmed off. Low-sodium, lean precooked or cured meat, such as sausages or meat loaves.  Dairy  Low-fat (1%) or fat-free (skim) milk. Reduced-fat, low-fat, or fat-free cheeses. Nonfat, low-sodium ricotta or cottage cheese. Low-fat or nonfat yogurt. Low-fat, low-sodium cheese.  Fats and oils  Soft margarine without trans fats. Vegetable oil. Reduced-fat, low-fat, or light mayonnaise and salad dressings (reduced-sodium). Canola, safflower, olive, avocado, soybean, and sunflower oils. Avocado.  Seasonings and condiments  Herbs. Spices. Seasoning mixes without salt.  Other foods  Unsalted popcorn and pretzels. Fat-free sweets.  The items listed above may not be a complete list of foods and beverages you can eat. Contact a dietitian for more information.  What foods should I avoid?  Fruits  Canned fruit in a light or heavy syrup. Fried fruit. Fruit in cream or butter sauce.  Vegetables  Creamed or fried vegetables. Vegetables in a cheese sauce. Regular canned vegetables (not low-sodium or reduced-sodium). Regular canned tomato sauce and paste (not low-sodium or reduced-sodium). Regular tomato and vegetable juice (not low-sodium or reduced-sodium). Pickles. Olives.  Grains  Baked goods made with fat, such as croissants, muffins, or some breads. Dry pasta or rice meal packs.  Meats and other proteins  Fatty cuts of meat. Ribs. Fried meat. Vivar. Bologna, salami, and other precooked or cured meats, such as sausages or meat loaves. Fat from the back of a pig (fatback). Bratwurst. Salted nuts and seeds. Canned beans with added salt.  Canned or smoked fish. Whole eggs or egg yolks. Chicken or turkey with skin.  Dairy  Whole or 2% milk, cream, and half-and-half. Whole or full-fat cream cheese. Whole-fat or sweetened yogurt. Full-fat cheese. Nondairy creamers. Whipped toppings. Processed cheese and cheese spreads.  Fats and oils  Butter. Stick margarine. Lard. Shortening. Ghee. Vivar fat. Tropical oils, such as coconut, palm kernel, or palm oil.  Seasonings and condiments  Onion salt, garlic salt, seasoned salt, table salt, and sea salt. Worcestershire sauce. Tartar sauce. Barbecue sauce. Teriyaki sauce. Soy sauce, including reduced-sodium. Steak sauce. Canned and packaged gravies. Fish sauce. Oyster sauce. Cocktail sauce. Store-bought horseradish. Ketchup. Mustard. Meat flavorings and tenderizers. Bouillon cubes. Hot sauces. Pre-made or packaged marinades. Pre-made or packaged taco seasonings. Relishes. Regular salad dressings.  Other foods  Salted popcorn and pretzels.  The items listed above may not be a complete list of foods and beverages you should avoid. Contact a dietitian for more information.  Where to find more information  National Heart, Lung, and Blood [place]: www.nhlbi.nih.gov  American Heart Association: www.heart.org  Academy of Nutrition and Dietetics: www.eatright.org  National Kidney Foundation: www.kidney.org  Summary  The DASH eating plan is a healthy eating plan that has been shown to reduce high blood pressure (hypertension). It may also reduce your risk for type 2 diabetes, heart disease, and stroke.  When on the DASH eating plan, aim to eat more fresh fruits and vegetables, whole grains, lean proteins, low-fat dairy, and heart-healthy fats.  With the DASH eating plan, you should limit salt (sodium) intake to 2,300 mg a day. If you have hypertension, you may need to reduce your sodium intake to 1,500 mg a day.  Work with your health care provider or dietitian to adjust your eating plan to your individual calorie  needs.  This information is not intended to replace advice given to you by your health care provider. Make sure you discuss any questions you have with your health care provider.  Document Revised: 11/20/2020 Document Reviewed: 11/20/2020  Elsevier Patient Education © 2023 [employer]

## 2025-03-25 NOTE — ASSESSMENT & PLAN NOTE
Continue atorvastatin 10 mg daily.  I have given Shalini a prescription to get a follow-up lipid profile and CMP.  I encouraged her to walk for exercise and have given her information about a low-fat low-cholesterol low-salt diet.

## 2025-03-25 NOTE — LETTER
March 25, 2025     Renée Pittman DO  [address] [address]  [facility] [address]    Patient: Shalini Kunz  YOB: 1935  Date of Visit: 3/25/2025      Dear Dr. Pittman:    Thank you for referring Shalini Kunz to me for evaluation. Below are my notes for this consultation.    If you have questions, please do not hesitate to call me. I look forward to following your patient along with you.         Sincerely,        Ron Jose MD        CC: DIANA Daniels Michael, MD  3/25/2025 11:55 AM  Sign when Signing Visit     Cardiology Note       Reason for visit: Paroxysmal atrial fibrillation      Shalini returns today for follow-up.  She tells me she has been doing well without any chest discomfort or shortness of breath.  There has not been any palpitations or skipped beats.    When she had her loop recorder interrogated there was no evidence of atrial fibrillation.  She is concerned that she has not been getting followed by remote monitoring.  As a said they would be in touch with her and she has not heard from them as of yet.    Shalini has not had any recent hospitalizations or emergency room visits.  No neurologic or TIA-like symptoms.    On exam in the office her blood pressure is 126/80.    The EKG revealed normal sinus rhythm and was normal.        Past Medical History:   Diagnosis Date    Anemia     Arthritis     Atrial fibrillation (CMS/HCC)     Only documented episode was during acute illness    Deep vein thrombosis (CMS/HCC)     2019    GI (gastrointestinal bleed)     Hypertension     Lipid disorder      Past Surgical History   Procedure Laterality Date    Abdominal surgery      2019    Adenoidectomy Bilateral     Cholecystectomy  10/03/2011    COLONOSCOPY W/ OR W/O BIOPSY N/A 2/20/2024    Per[facility] at [facility] GI    Eye surgery      2020     Ganglion cyst excision      Gastric bypass      Loop recorder implant N/A 5/1/2024     Performed by [facility]i [facility]arias [facility] [place] [place]/EP    Tonsillectomy      UPPER GASTROINTESTINAL ENDOSCOPY WITH BIOPSY N/A 2/19/2024    Performed by [facility] at [facility] GI     Social History     Tobacco Use    Smoking status: Never    Smokeless tobacco: Never   Vaping Use    Vaping status: Never Used   Substance Use Topics    Alcohol use: Yes     Alcohol/week: 1.0 standard drink of alcohol     Types: 1 Glasses of wine per week     Comment: half one at dinner     Drug use: Never      Family History   Problem Relation Name Age of Onset    Alzheimer's disease Biological Mother      Heart failure Biological Father      No Known Problems Biological Sister       Patient has no known allergies.  Current Outpatient Medications   Medication Instructions    acetaminophen (TYLENOL EXTRA STRENGTH ORAL) As needed    atorvastatin (LIPITOR) 10 mg tablet TAKE 1/2 TABLET  (5 MG TOTAL) BY MOUTH DAILY.    calcium carbonate-vitamin D3 (CALCIUM 600 WITH VITAMIN D3) 600 mg(1,500mg) -400 unit tablet,chewable 600 mg, Once    cholecalciferol (vitamin D3) 1,000 Units, Daily    IBUPROFEN ORAL As needed    metoprolol succinate XL (TOPROL-XL) 25 mg, oral, Nightly    multivitamin capsule 1 capsule, Daily    omeprazole (PRILOSEC) 40 mg, oral, 2 times daily before breakfast and dinner    SUMAtriptan (IMITREX) 50 mg, oral, Once as needed          Review of Systems   Cardiovascular:  Negative for chest pain, dyspnea on exertion, irregular heartbeat, leg swelling, near-syncope, orthopnea, palpitations, paroxysmal nocturnal dyspnea and syncope.   Neurological:  Negative for dizziness and light-headedness.      Objective    Vitals:    03/25/25 1135   BP: 126/80   Pulse: 86   SpO2: 97%      Wt Readings from Last 3 Encounters:   03/25/25 66.7 kg (147 lb)   02/10/25 66.2 kg (146 lb)   09/24/24 66.7 kg (147 lb)      Physical Exam  Neck:      Vascular: No carotid bruit or JVD.   Cardiovascular:      Rate and Rhythm:  Normal rate and regular rhythm.      Pulses:           Carotid pulses are 2+ on the right side and 2+ on the left side.       Dorsalis pedis pulses are 2+ on the right side and 2+ on the left side.        Posterior tibial pulses are 2+ on the right side and 2+ on the left side.      Heart sounds: S1 normal and S2 normal. No murmur heard.     No friction rub. No gallop.   Pulmonary:      Effort: Pulmonary effort is normal.      Breath sounds: Normal breath sounds.   Musculoskeletal:      Right lower leg: No edema.      Left lower leg: No edema.   Skin:     General: Skin is warm and dry.   Neurological:      Mental Status: She is alert.   Psychiatric:         Behavior: Behavior normal.                   Implantation of a loop recorder.  5/1/2024   [employer] subcutaneous loop recorder implant       Pharmacologic nuclear stress test.  1/29/2024.    Negative lexiscan ECG test. Nuclear images pending and are to be reported by radiology    Response to Stress: There were no arrhythmias during stress. There were no arrhythmias during recovery.  IMPRESSION:  No evidence for pharmacologically induced ischemia.  LVEF: 72%.     Transthoracic echocardiogram.  3/7/2023    Left Ventricle: Normal ventricle size. Normal wall thickness. Estimated EF 55%. No regional wall motion abnormalities. Grade I diastolic dysfunction.    Right Ventricle: Normal ventricle size. Low normal systolic function.    Left Atrium: Mildly dilated atrium.    Right Atrium: Normal sized atrium.    Aortic Valve: Tricuspid valve. Moderate regurgitation. No stenosis.  Aorta top normal 3.8 cm    Mitral Valve: Bileaflet thickening. Mild regurgitation. The jet is eccentric.    Tricuspid Valve: Normal structure. Mild regurgitation. Estimated RVSP = 27 mmHg.    Pulmonic Valve: Normal structure. Trace regurgitation.    IVC/SVC: Inferior vena cava is <2.1cm. Inferior vena cava collapses >50% during inspiration.    Pericardium: Normal  structure      ECG.  Normal sinus rhythm.  Normal EKG     Assessment/Plan    Paroxysmal atrial fibrillation (CMS/HCC)  Shalini will continue to be monitored with the loop recorder.    I will reach out to EPS in regards to her continuing with remote monitoring.    She will continue on the Toprol 25 mg daily.    Primary hypertension  Blood pressure was within the normal range.  Continue on the Toprol XL 25 mg daily.    Other hyperlipidemia  Continue atorvastatin 10 mg daily.  I have given Shalini a prescription to get a follow-up lipid profile and CMP.  I encouraged her to walk for exercise and have given her information about a low-fat low-cholesterol low-salt diet.    Overall, Shalini remains stable.  She will be coming to for her day-to-day care.  She will return to see me in 8 months time or sooner if there is a problem.  I will call her with the results of her blood work after it is performed.  She will call with any questions or concerns in the interim.            Thank you for allowing me to participate in the care of this patient.  If you have any questions please don't hesitate to contact me.    I spent 30 minutes on this date of service performing the following activities: obtaining history, performing examination, entering orders, documenting, preparing for visit, obtaining / reviewing records, providing counseling and education and communicating results.    I attest that this visit supports the complexity inherent to evaluation and management associated with medical care services that serve as the continuing focal point for all needed health care services and/or medical care services that are part of ongoing care related to this patient's single, serious condition or a complex condition.      Ron Jose MD [facility]   3/25/2025  11:54 AM

## 2025-03-26 ENCOUNTER — TELEPHONE (OUTPATIENT)
Dept: CARDIOLOGY | Facility: CLINIC | Age: 89
End: 2025-03-26
Payer: MEDICARE

## 2025-03-26 NOTE — TELEPHONE ENCOUNTER
Can we please contact this patient to make sure she is set up appropriately with remote monitoring.

## 2025-03-26 NOTE — TELEPHONE ENCOUNTER
Patient has [employer]. Last connection Aug 25, 2024. Multiple previous attempts were made to reconnect patient.     Unable to reconnect patient. Provided number for [employer] [facility] [phone].

## 2025-03-28 ENCOUNTER — LAB REQUISITION (OUTPATIENT)
Dept: LAB | Facility: HOSPITAL | Age: 89
End: 2025-03-28
Attending: INTERNAL MEDICINE
Payer: MEDICARE

## 2025-03-28 DIAGNOSIS — E78.49 OTHER HYPERLIPIDEMIA: ICD-10-CM

## 2025-03-28 DIAGNOSIS — I10 ESSENTIAL (PRIMARY) HYPERTENSION: ICD-10-CM

## 2025-03-28 LAB
ALBUMIN SERPL-MCNC: 4.2 G/DL (ref 3.5–5.7)
ALP SERPL-CCNC: 49 IU/L (ref 34–125)
ALT SERPL-CCNC: 14 IU/L (ref 7–52)
ANION GAP SERPL CALC-SCNC: 7 MEQ/L (ref 3–15)
AST SERPL-CCNC: 19 IU/L (ref 13–39)
BILIRUB SERPL-MCNC: 0.5 MG/DL (ref 0.3–1.2)
BUN SERPL-MCNC: 22 MG/DL (ref 7–25)
CALCIUM SERPL-MCNC: 9.4 MG/DL (ref 8.6–10.3)
CHLORIDE SERPL-SCNC: 104 MEQ/L (ref 98–107)
CHOLEST SERPL-MCNC: 185 MG/DL
CO2 SERPL-SCNC: 29 MEQ/L (ref 21–31)
CREAT SERPL-MCNC: 0.8 MG/DL (ref 0.6–1.2)
EGFRCR SERPLBLD CKD-EPI 2021: >60 ML/MIN/1.73M*2
GLUCOSE SERPL-MCNC: 86 MG/DL (ref 70–99)
HDLC SERPL-MCNC: 55 MG/DL
HDLC SERPL: 3.4 {RATIO}
LDLC SERPL CALC-MCNC: 89 MG/DL
NONHDLC SERPL-MCNC: 130 MG/DL
POTASSIUM SERPL-SCNC: 4.5 MEQ/L (ref 3.5–5.1)
PROT SERPL-MCNC: 6.3 G/DL (ref 6–8.2)
SODIUM SERPL-SCNC: 140 MEQ/L (ref 136–145)
TRIGL SERPL-MCNC: 207 MG/DL

## 2025-03-28 PROCEDURE — 36415 COLL VENOUS BLD VENIPUNCTURE: CPT | Performed by: INTERNAL MEDICINE

## 2025-03-28 PROCEDURE — 80061 LIPID PANEL: CPT | Performed by: INTERNAL MEDICINE

## 2025-03-28 PROCEDURE — 80053 COMPREHEN METABOLIC PANEL: CPT | Performed by: INTERNAL MEDICINE

## 2025-04-01 ENCOUNTER — RESULTS FOLLOW-UP (OUTPATIENT)
Dept: CARDIOLOGY | Facility: CLINIC | Age: 89
End: 2025-04-01

## 2025-06-27 ENCOUNTER — OFFICE VISIT (OUTPATIENT)
Dept: INTERNAL MEDICINE | Facility: CLINIC | Age: 89
End: 2025-06-27
Payer: MEDICARE

## 2025-06-27 VITALS
DIASTOLIC BLOOD PRESSURE: 56 MMHG | HEART RATE: 68 BPM | TEMPERATURE: 97.6 F | OXYGEN SATURATION: 98 % | SYSTOLIC BLOOD PRESSURE: 132 MMHG

## 2025-06-27 DIAGNOSIS — I10 ESSENTIAL HYPERTENSION: ICD-10-CM

## 2025-06-27 DIAGNOSIS — E53.8 B12 DEFICIENCY: ICD-10-CM

## 2025-06-27 DIAGNOSIS — E55.9 VITAMIN D DEFICIENCY: ICD-10-CM

## 2025-06-27 DIAGNOSIS — R79.9 ABNORMAL FINDING OF BLOOD CHEMISTRY: ICD-10-CM

## 2025-06-27 DIAGNOSIS — Z95.818 IMPLANTABLE LOOP RECORDER PRESENT: ICD-10-CM

## 2025-06-27 DIAGNOSIS — I48.19 PERSISTENT ATRIAL FIBRILLATION (CMS/HCC): ICD-10-CM

## 2025-06-27 DIAGNOSIS — D50.9 IRON DEFICIENCY ANEMIA, UNSPECIFIED IRON DEFICIENCY ANEMIA TYPE: ICD-10-CM

## 2025-06-27 RX ORDER — CLOTRIMAZOLE AND BETAMETHASONE DIPROPIONATE 10; .64 MG/G; MG/G
CREAM TOPICAL 2 TIMES DAILY
COMMUNITY

## 2025-06-27 NOTE — PROGRESS NOTES
Brian Eagle DO          Reason for visit: No chief complaint on file.     Shalini Kunz is a 89 y.o. female who presents for    HPI  She reports hx of migraine without aura for which she takes sumatriptan 50 mg prn on a rare occasion. HA are associated with nausea. No photosensitivity and no noise sensitivity.     She reports groin pruritus from time to time for which she applies prn clotrimazole-betamethason 1%/0.05% cream.    She reports being in [place] a couple weeks ago and had acute liquid NBNB diarrhea that eventually self resolved.    Denies recent falls.      Past Medical History:   Diagnosis Date   • Anemia    • Arthritis    • Atrial fibrillation (CMS/HCC)     Only documented episode was during acute illness   • Deep vein thrombosis (CMS/HCC)     2019   • GI (gastrointestinal bleed)    • Hypertension    • Lipid disorder        Past Surgical History   Procedure Laterality Date   • Abdominal surgery      2019   • Adenoidectomy Bilateral    • Cholecystectomy  10/03/2011   • COLONOSCOPY W/ OR W/O BIOPSY N/A 2/20/2024    Performed by Y[facility]ana Harden MD at [facility] GI   • Eye surgery      2020    • Ganglion cyst excision     • Gastric bypass     • Loop recorder implant N/A 5/1/2024    Performed by Beni Carias [facility] at [facility] [place] [facility]ARDIAC CATH/EP   • Tonsillectomy     • UPPER GASTROINTESTINAL ENDOSCOPY WITH BIOPSY N/A 2/19/2024    Performed by Y[facility]ana Harden MD at [facility] GI       Social History     Tobacco Use   • Smoking status: Never   • Smokeless tobacco: Never   Vaping Use   • Vaping status: Never Used   Substance Use Topics   • Alcohol use: Yes     Alcohol/week: 1.0 standard drink of alcohol     Types: 1 Glasses of wine per week     Comment: half one at dinner    • Drug use: Never       Family History   Problem Relation Name Age of Onset   • Alzheimer's disease Biological Mother     • Heart failure Biological Father     • No Known Problems Biological Sister         Patient has no known  allergies.      Current Outpatient Medications:   •  acetaminophen (TYLENOL EXTRA STRENGTH ORAL), Take by mouth as needed., Disp: , Rfl:   •  atorvastatin (LIPITOR) 10 mg tablet, TAKE 1/2 TABLET  (5 MG TOTAL) BY MOUTH DAILY., Disp: 45 tablet, Rfl: 3  •  calcium carbonate-vitamin D3 (CALCIUM 600 WITH VITAMIN D3) 600 mg(1,500mg) -400 unit tablet,chewable, Take 600 mg by mouth once., Disp: , Rfl:   •  cholecalciferol, vitamin D3, 1,000 unit (25 mcg) tablet, Take 1 tablet (1,000 Units total) by mouth daily., Disp: 90 tablet, Rfl: 3  •  IBUPROFEN ORAL, as needed., Disp: , Rfl:   •  metoprolol succinate XL (TOPROL-XL) 25 mg 24 hr tablet, Take 1 tablet (25 mg total) by mouth nightly., Disp: 90 tablet, Rfl: 3  •  multivitamin capsule, Take 1 capsule by mouth daily., Disp: , Rfl:   •  omeprazole (PriLOSEC) 40 mg capsule, Take 1 capsule (40 mg total) by mouth 2 (two) times a day before breakfast and dinner., Disp: 180 capsule, Rfl: 3  •  SUMAtriptan (IMITREX) 50 mg tablet, TAKE 1 TABLET (50 MG TOTAL) BY MOUTH ONCE AS NEEDED FOR MIGRAINE., Disp: 30 tablet, Rfl: 1    Review of Systems   All other systems reviewed and are negative.      Objective   There were no vitals filed for this visit.  There is no height or weight on file to calculate BMI.    Physical Exam  Vitals reviewed.   Constitutional:       General: She is not in acute distress.     Appearance: Normal appearance. She is not ill-appearing or toxic-appearing.   HENT:      Head: Normocephalic and atraumatic.      Mouth/Throat:      Mouth: Mucous membranes are moist.   Eyes:      Extraocular Movements: Extraocular movements intact.      Conjunctiva/sclera: Conjunctivae normal.   Cardiovascular:      Rate and Rhythm: Normal rate and regular rhythm.      Heart sounds: No murmur heard.     No friction rub. No gallop.   Pulmonary:      Effort: Pulmonary effort is normal.      Breath sounds: Normal breath sounds. No wheezing, rhonchi or rales.   Abdominal:      General:  Bowel sounds are normal. There is no distension.      Palpations: Abdomen is soft.      Tenderness: There is no abdominal tenderness. There is no rebound.   Musculoskeletal:         General: Normal range of motion.      Cervical back: Normal range of motion.   Skin:     General: Skin is warm.      Capillary Refill: Capillary refill takes less than 2 seconds.   Neurological:      General: No focal deficit present.      Mental Status: She is alert and oriented to person, place, and time. Mental status is at baseline.   Psychiatric:         Mood and Affect: Mood normal.         Behavior: Behavior normal.         Thought Content: Thought content normal.         Judgment: Judgment normal.       Lab Results   Component Value Date    WBC 5.90 12/20/2024    HGB 14.5 12/20/2024    HCT 44.6 12/20/2024     12/20/2024    CHOL 185 03/28/2025    TRIG 207 (H) 03/28/2025    HDL 55 03/28/2025    ALT 14 03/28/2025    AST 19 03/28/2025     03/28/2025    K 4.5 03/28/2025     03/28/2025    CREATININE 0.8 03/28/2025    BUN 22 03/28/2025    CO2 29 03/28/2025    TSH 3.50 06/28/2024    INR 1.2 02/17/2024    HGBA1C 5.6 06/28/2024            Assessment   {There are no diagnoses linked to this encounter. (Refresh or delete this SmartLink)}        No follow-ups on file.    There are no Patient Instructions on file for this visit.     Brian Eagle DO     6/27/2025

## 2025-07-01 PROBLEM — D50.9 IRON DEFICIENCY ANEMIA: Status: ACTIVE | Noted: 2025-07-01

## 2025-07-01 PROBLEM — I48.19 PERSISTENT ATRIAL FIBRILLATION (CMS/HCC): Status: RESOLVED | Noted: 2025-07-01 | Resolved: 2025-07-01

## 2025-07-01 PROBLEM — I48.19 PERSISTENT ATRIAL FIBRILLATION (CMS/HCC): Status: ACTIVE | Noted: 2025-07-01

## 2025-07-01 PROBLEM — G43.009 MIGRAINE HEADACHE WITHOUT AURA: Status: ACTIVE | Noted: 2025-07-01

## 2025-07-22 RX ORDER — OMEPRAZOLE 40 MG/1
40 CAPSULE, DELAYED RELEASE ORAL
Qty: 180 CAPSULE | Refills: 3 | Status: SHIPPED | OUTPATIENT
Start: 2025-07-22 | End: 2026-07-22

## 2025-08-01 ENCOUNTER — CLINICAL SUPPORT (OUTPATIENT)
Dept: CARDIOLOGY | Facility: CLINIC | Age: 89
End: 2025-08-01
Payer: MEDICARE

## 2025-08-01 DIAGNOSIS — I48.0 PAROXYSMAL ATRIAL FIBRILLATION (CMS/HCC): Primary | ICD-10-CM

## 2025-08-01 DIAGNOSIS — Z95.818 IMPLANTABLE LOOP RECORDER PRESENT: ICD-10-CM

## 2025-08-01 PROCEDURE — 93298 REM INTERROG DEV EVAL SCRMS: CPT | Performed by: INTERNAL MEDICINE

## 2025-08-04 NOTE — PROGRESS NOTES
LOOP Recorder    This service was provided by a [facility]  Review of the remote interrogation findings are below.  Supporting PDF transmission data are attached.    Shalini Kunz had a remote follow up for Her LOOP recorder on 8/1/2025.  Her presenting rhythm today is regular ventricular sensed rhythm with discernable P-waves suggestive of sinus rhythm  Battery voltage is  OK  Events:  Symptom events 0   Tachy events 0   Pauses 0   Bradycardia 0   AT events 0   AF events 0   AF% 0 %      Review of heart rate histograms revealed adequate heart rate distribution  PLAN:  Stable remote device evaluation  Continue monthly remote monitoring

## 2025-09-02 ENCOUNTER — TELEPHONE (OUTPATIENT)
Dept: INTERNAL MEDICINE | Facility: CLINIC | Age: 89
End: 2025-09-02
Payer: MEDICARE

## 2025-09-03 ENCOUNTER — TELEPHONE (OUTPATIENT)
Dept: INTERNAL MEDICINE | Facility: CLINIC | Age: 89
End: 2025-09-03
Payer: MEDICARE

## 2025-09-03 ENCOUNTER — HOSPITAL ENCOUNTER (OUTPATIENT)
Dept: RADIOLOGY | Age: 89
Discharge: HOME | End: 2025-09-03
Attending: STUDENT IN AN ORGANIZED HEALTH CARE EDUCATION/TRAINING PROGRAM
Payer: MEDICARE

## 2025-09-03 DIAGNOSIS — Z12.31 BREAST CANCER SCREENING BY MAMMOGRAM: Primary | ICD-10-CM

## 2025-09-03 DIAGNOSIS — Z12.31 BREAST CANCER SCREENING BY MAMMOGRAM: ICD-10-CM

## 2025-09-03 PROCEDURE — 77067 SCR MAMMO BI INCL CAD: CPT

## 2025-09-04 ENCOUNTER — CLINICAL SUPPORT (OUTPATIENT)
Dept: CARDIOLOGY | Facility: CLINIC | Age: 89
End: 2025-09-04
Payer: MEDICARE

## 2025-09-04 DIAGNOSIS — Z95.818 IMPLANTABLE LOOP RECORDER PRESENT: ICD-10-CM

## 2025-09-04 DIAGNOSIS — I48.0 PAROXYSMAL ATRIAL FIBRILLATION (CMS/HCC): Primary | ICD-10-CM

## 2025-09-05 DIAGNOSIS — I48.0 PAROXYSMAL ATRIAL FIBRILLATION (CMS/HCC): ICD-10-CM

## (undated) DEVICE — GOWN SIRUS POLYRNF BRTHSLV LG 30/CS

## (undated) DEVICE — FORCEP COLD RADIAL JAW

## (undated) DEVICE — BLOCK BITE DISP

## (undated) DEVICE — BIOGUARD VALVES

## (undated) DEVICE — CUSTOM PROCEDURE KIT

## (undated) DEVICE — CLEANSTART BEDSIDE KIT 500ML